# Patient Record
Sex: MALE | Race: BLACK OR AFRICAN AMERICAN | NOT HISPANIC OR LATINO | Employment: OTHER | URBAN - NONMETROPOLITAN AREA
[De-identification: names, ages, dates, MRNs, and addresses within clinical notes are randomized per-mention and may not be internally consistent; named-entity substitution may affect disease eponyms.]

---

## 2017-03-10 ENCOUNTER — OFFICE VISIT (OUTPATIENT)
Dept: UROLOGY | Facility: CLINIC | Age: 67
End: 2017-03-10

## 2017-03-10 VITALS — TEMPERATURE: 97.4 F | HEIGHT: 68 IN | WEIGHT: 146.8 LBS | BODY MASS INDEX: 22.25 KG/M2

## 2017-03-10 DIAGNOSIS — N40.1 BPH (BENIGN PROSTATIC HYPERTROPHY) WITH URINARY OBSTRUCTION: Primary | ICD-10-CM

## 2017-03-10 DIAGNOSIS — N13.8 BPH (BENIGN PROSTATIC HYPERTROPHY) WITH URINARY OBSTRUCTION: Primary | ICD-10-CM

## 2017-03-10 PROCEDURE — 99214 OFFICE O/P EST MOD 30 MIN: CPT | Performed by: UROLOGY

## 2017-03-10 NOTE — PROGRESS NOTES
"Subjective    Mr. Mercado is 66 y.o. male    Chief Complaint:BPH  History of Present Illness     Benign Prostatic Hypertrophy  Patient complains of lower urinary tract symptoms. He reports frequency, incomplete emptying, intermittency, nocturia three times a night, straining, urgency and weak stream. He denies dysuria. Patient states symptoms are of severe severity. Onset of symptoms was several years ago and was gradual in onset. His AUA Symptom Score is, 28/35.He reports a history of recurrent UTI. He denies flank pain, gross hematuria and kidney stones.  Patient has tried Alpha blockers and 5 alpha reductase inhibitors without improvement. Last PSA was 4.6.            The following portions of the patient's history were reviewed and updated as appropriate: allergies, current medications, past family history, past medical history, past social history, past surgical history and problem list.    Review of Systems   Constitutional: Negative for chills and fever.   Gastrointestinal: Negative for abdominal pain, anal bleeding and blood in stool.   Genitourinary: Positive for difficulty urinating, frequency and urgency. Negative for flank pain and hematuria.       No current outpatient prescriptions on file.    History reviewed. No pertinent past medical history.    History reviewed. No pertinent past surgical history.    Social History     Social History   • Marital status: Single     Spouse name: N/A   • Number of children: N/A   • Years of education: N/A     Social History Main Topics   • Smoking status: Current Every Day Smoker     Types: Cigarettes   • Smokeless tobacco: None   • Alcohol use Yes   • Drug use: None   • Sexual activity: Not Asked     Other Topics Concern   • None     Social History Narrative   • None       Family History   Problem Relation Age of Onset   • No Known Problems Father    • No Known Problems Mother        Objective    Visit Vitals   • Temp 97.4 °F (36.3 °C)   • Ht 68\" (172.7 cm)   • Wt " 146 lb 12.8 oz (66.6 kg)   • BMI 22.32 kg/m2       Physical Exam   Constitutional: He is oriented to person, place, and time. He appears well-developed and well-nourished. No distress.   Pulmonary/Chest: Effort normal.   Abdominal: Soft. He exhibits no distension and no mass. There is no tenderness. There is no rebound and no guarding. No hernia.   Neurological: He is alert and oriented to person, place, and time.   Skin: Skin is warm and dry. He is not diaphoretic.   Psychiatric: He has a normal mood and affect.   Vitals reviewed.          Results for orders placed or performed in visit on 11/07/16   PSA, Total & Free   Result Value Ref Range    PSA 4.6 (H) 0.0 - 4.0 ng/mL    PSA, Free 1.16 N/A ng/mL    PSA, Free % 25.2 %     Assessment and Plan    Paul was seen today for benign prostatic hypertrophy.    Diagnoses and all orders for this visit:    BPH (benign prostatic hypertrophy) with urinary obstruction        Patient has had worsening voiding symptoms.  He has had episodes of recurrent epididymitis in the past.  He has a history of negative prostate biopsy due to thousand 13 his last PSA was 4.6 with a percent free of 25%.  We discussed options of think the most reasonable option is to proceed with cystoscopy next week with likely plans for him to undergo a TURP in the near future.  I discussed risks and benefits of TURP and he voiced understanding of these.

## 2017-03-17 ENCOUNTER — PROCEDURE VISIT (OUTPATIENT)
Dept: UROLOGY | Facility: CLINIC | Age: 67
End: 2017-03-17

## 2017-03-17 DIAGNOSIS — N13.8 BPH WITH URINARY OBSTRUCTION: Primary | ICD-10-CM

## 2017-03-17 DIAGNOSIS — N40.1 BPH WITH URINARY OBSTRUCTION: Primary | ICD-10-CM

## 2017-03-17 LAB
BILIRUB BLD-MCNC: NEGATIVE MG/DL
CLARITY, POC: CLEAR
COLOR UR: YELLOW
GLUCOSE UR STRIP-MCNC: NEGATIVE MG/DL
KETONES UR QL: NEGATIVE
LEUKOCYTE EST, POC: ABNORMAL
NITRITE UR-MCNC: NEGATIVE MG/ML
PH UR: 6 [PH] (ref 5–8)
PROT UR STRIP-MCNC: NEGATIVE MG/DL
RBC # UR STRIP: NEGATIVE /UL
SP GR UR: 1.02 (ref 1–1.03)
UROBILINOGEN UR QL: NORMAL

## 2017-03-17 PROCEDURE — 99213 OFFICE O/P EST LOW 20 MIN: CPT | Performed by: UROLOGY

## 2017-03-17 PROCEDURE — 81003 URINALYSIS AUTO W/O SCOPE: CPT | Performed by: UROLOGY

## 2017-03-17 PROCEDURE — 52000 CYSTOURETHROSCOPY: CPT | Performed by: UROLOGY

## 2017-03-17 RX ORDER — SODIUM CHLORIDE 9 MG/ML
100 INJECTION, SOLUTION INTRAVENOUS CONTINUOUS
Status: CANCELLED | OUTPATIENT
Start: 2017-03-17

## 2017-03-17 RX ORDER — SODIUM CHLORIDE 0.9 % (FLUSH) 0.9 %
1-10 SYRINGE (ML) INJECTION AS NEEDED
Status: CANCELLED | OUTPATIENT
Start: 2017-03-17

## 2017-03-17 NOTE — PROGRESS NOTES
Subjective    Mr. Mercado is 66 y.o. male    Chief Complaint: BPH    History of Present Illness     Benign Prostatic Hypertrophy  Patient complains of lower urinary tract symptoms. He reports frequency, incomplete emptying, intermittency, nocturia three times a night, straining, urgency and weak stream. He denies dysuria. Patient states symptoms are of severe severity. Onset of symptoms was several years ago and was gradual in onset. His AUA Symptom Score is, 28/35.He reports a history of recurrent UTI. He denies flank pain, gross hematuria and kidney stones. Patient has tried Alpha blockers and 5 alpha reductase inhibitors without improvement. Last PSA was 4.6.    The following portions of the patient's history were reviewed and updated as appropriate: allergies, current medications, past family history, past medical history, past social history, past surgical history and problem list.    Review of Systems   Constitutional: Negative for chills and fever.   Gastrointestinal: Negative for abdominal pain, anal bleeding and blood in stool.   Genitourinary: Positive for difficulty urinating, frequency and urgency. Negative for flank pain and hematuria.       No current outpatient prescriptions on file.    History reviewed. No pertinent past medical history.    No past surgical history on file.    Social History     Social History   • Marital status: Single     Spouse name: N/A   • Number of children: N/A   • Years of education: N/A     Social History Main Topics   • Smoking status: Current Every Day Smoker     Types: Cigarettes   • Smokeless tobacco: None   • Alcohol use Yes   • Drug use: None   • Sexual activity: Not Asked     Other Topics Concern   • None     Social History Narrative       Family History   Problem Relation Age of Onset   • No Known Problems Father    • No Known Problems Mother        Objective    There were no vitals taken for this visit.    Physical Exam   Constitutional: He is oriented to person,  place, and time. He appears well-developed and well-nourished. No distress.   Pulmonary/Chest: Effort normal.   Abdominal: Soft. He exhibits no distension and no mass. There is no tenderness. There is no rebound and no guarding. No hernia.   Neurological: He is alert and oriented to person, place, and time.   Skin: Skin is warm and dry. He is not diaphoretic.   Psychiatric: He has a normal mood and affect.   Vitals reviewed.      Pre- operative diagnosis:  Benign prostatic hypertrophy    Post operative diagnosis:  Same    Procedure:  The patient was prepped and draped in a normal sterile fashion.  The urethra was anesthetized with 2% lidocaine jelly.  A flexible cystoscope was introduced per urethra.      Urethra:  Normal    Bladder:  heavy trabeculation    Ureteral orifices:  Normal position bilaterally and Clear efflux bilaterally    Prostate:  lateral lobe hypertrophy    Patient tolerated the procedure well    Complications: none    Blood loss: minimal    Follow up:    Schedule for OR  TURP      Results for orders placed or performed in visit on 03/17/17   POC Urinalysis Dipstick, Automated   Result Value Ref Range    Color Yellow Yellow, Straw, Dark Yellow, Stacy    Clarity, UA Clear Clear    Glucose, UA Negative Negative, 1000 mg/dL (3+) mg/dL    Bilirubin Negative Negative    Ketones, UA Negative Negative    Specific Gravity  1.025 1.005 - 1.030    Blood, UA Negative Negative    pH, Urine 6.0 5.0 - 8.0    Protein, POC Negative Negative mg/dL    Urobilinogen, UA Normal Normal    Leukocytes Small (1+) (A) Negative    Nitrite, UA Negative Negative     Assessment and Plan    Diagnoses and all orders for this visit:    BPH with urinary obstruction  -     POC Urinalysis Dipstick, Automated  -     Case Request; Standing  -     sodium chloride 0.9 % flush 1-10 mL; Infuse 1-10 mL into a venous catheter As Needed for Line Care.  -     sodium chloride 0.9 % infusion; Infuse 100 mL/hr into a venous catheter Continuous.  -      ceFAZolin (ANCEF) 2 g in sodium chloride 0.9 % 100 mL IVPB; Infuse 2 g into a venous catheter 1 (One) Time.  -     Case Request    Other orders  -     Provide instructions to patient on NPO status  -     Obtain informed consent  -     Follow Anesthesia Guidelines / Standing Orders; Standing  -     Verify NPO Status; Standing  -     Verify informed consent; Standing  -     CHRIS hose- To be placed on patient in pre-op; Standing  -     SCD (sequential compression device)- to be placed on patient in Pre-op; Standing  -     Insert Peripheral IV; Standing  -     Saline Lock & Maintain IV Access; Standing      Patient with lateral lobe hypertrophy his prostate.  In addition he has had several episodes of recurrent epididymitis.  I have recommended a TURP we will schedule this in the near future.    Discussion of TURP resulted in significant evaluation and addition to the cystoscopy today

## 2017-03-21 ENCOUNTER — APPOINTMENT (OUTPATIENT)
Dept: PREADMISSION TESTING | Facility: HOSPITAL | Age: 67
End: 2017-03-21

## 2017-03-21 VITALS
HEIGHT: 67 IN | WEIGHT: 146 LBS | OXYGEN SATURATION: 99 % | BODY MASS INDEX: 22.91 KG/M2 | SYSTOLIC BLOOD PRESSURE: 151 MMHG | DIASTOLIC BLOOD PRESSURE: 78 MMHG | HEART RATE: 84 BPM

## 2017-03-21 LAB
BACTERIA UR QL AUTO: ABNORMAL /HPF
BILIRUB UR QL STRIP: NEGATIVE
CLARITY UR: CLEAR
COLOR UR: YELLOW
DEPRECATED RDW RBC AUTO: 43.6 FL (ref 40–54)
ERYTHROCYTE [DISTWIDTH] IN BLOOD BY AUTOMATED COUNT: 14.7 % (ref 12–15)
GLUCOSE UR STRIP-MCNC: NEGATIVE MG/DL
HCT VFR BLD AUTO: 38.3 % (ref 40–52)
HGB BLD-MCNC: 12.4 G/DL (ref 14–18)
HGB UR QL STRIP.AUTO: NEGATIVE
HYALINE CASTS UR QL AUTO: ABNORMAL /LPF
KETONES UR QL STRIP: NEGATIVE
LEUKOCYTE ESTERASE UR QL STRIP.AUTO: ABNORMAL
MCH RBC QN AUTO: 26.4 PG (ref 28–32)
MCHC RBC AUTO-ENTMCNC: 32.4 G/DL (ref 33–36)
MCV RBC AUTO: 81.7 FL (ref 82–95)
NITRITE UR QL STRIP: NEGATIVE
PH UR STRIP.AUTO: 5.5 [PH] (ref 5–8)
PLATELET # BLD AUTO: 234 10*3/MM3 (ref 130–400)
PMV BLD AUTO: 11.3 FL (ref 6–12)
PROT UR QL STRIP: NEGATIVE
RBC # BLD AUTO: 4.69 10*6/MM3 (ref 4.8–5.9)
RBC # UR: ABNORMAL /HPF
REF LAB TEST METHOD: ABNORMAL
SP GR UR STRIP: 1.01 (ref 1–1.03)
SQUAMOUS #/AREA URNS HPF: ABNORMAL /HPF
UROBILINOGEN UR QL STRIP: ABNORMAL
WBC NRBC COR # BLD: 6.82 10*3/MM3 (ref 4.8–10.8)
WBC UR QL AUTO: ABNORMAL /HPF

## 2017-03-21 PROCEDURE — 85027 COMPLETE CBC AUTOMATED: CPT | Performed by: UROLOGY

## 2017-03-21 PROCEDURE — 87086 URINE CULTURE/COLONY COUNT: CPT | Performed by: UROLOGY

## 2017-03-21 PROCEDURE — 93005 ELECTROCARDIOGRAM TRACING: CPT

## 2017-03-21 PROCEDURE — 93010 ELECTROCARDIOGRAM REPORT: CPT | Performed by: INTERNAL MEDICINE

## 2017-03-21 PROCEDURE — 81001 URINALYSIS AUTO W/SCOPE: CPT | Performed by: UROLOGY

## 2017-03-21 NOTE — DISCHARGE INSTRUCTIONS
DAY OF SURGERY INSTRUCTIONS        YOUR SURGEON: KARISHMA ROSS    PROCEDURE: CYSTOSCOPY, TRANSURETHRAL RESECTION OF PROSTATE    DATE OF SURGERY: 3/28/17    ARRIVAL TIME: AS DIRECTED BY OFFICE    DAY OF SURGERY TAKE ONLY THESE MEDICATIONS: NONE.            BEFORE YOU COME TO THE HOSPITAL  (Pre-op instructions)  • Do not eat, drink, smoke or chew gum after midnight the night before surgery.  This also includes no mints.  • Morning of surgery take only the medicines you have been instructed with a sip of water unless otherwise instructed  by your physician.  • Do not shave, wear makeup or dark nail polish.  • Remove all jewelry including rings.  • Leave anything you consider valuable at home.  • Leave your suitcase in the car until after your surgery.  • Bring the following with you if applicable:  o Picture ID and insurance, Medicare or Medicaid cards  o Co-pay/deductible required by insurance (cash, check, credit card)  o Medications (no narcotics) in original bottles (not a list) including all over-the-counter medications.  o Copy of advance directive, living will or power-of- documents if not brought to PAT  o CPAP or BIPAP mask and tubing  o Skin prep instruction sheet  o Relaxation aids (MP3 player, book, magazine)  • Confirm your arrival time with you surgeon the day before your surgery (surgery times are subject to change)  • On the day of surgery check in at registration located at the main entrance of the hospital.       Outpatient Surgery Guidelines, Adult  Outpatient procedures are those for which the person having the procedure is allowed to go home the same day as the procedure. Various procedures are done on an outpatient basis. You should follow some general guidelines if you will be having an outpatient procedure.  LET YOUR HEALTH CARE PROVIDER KNOW ABOUT:  · Any allergies you have.  · All medicines you are taking, including vitamins, herbs, eye drops, creams, and over-the-counter  medicines.  · Previous problems you or members of your family have had with the use of anesthetics.  · Any blood disorders you have.  · Previous surgeries you have had.  · Medical conditions you have.  RISKS AND COMPLICATIONS  Your health care provider will discuss possible risks and complications with you before surgery. Common risks and complications include:    · Problems due to the use of anesthetics.  · Blood loss and replacement (does not apply to minor surgical procedures).  · Temporary increase in pain due to surgery.  · Uncorrected pain or problems that the surgery was meant to correct.  · Infection.  · New damage.  BEFORE THE PROCEDURE  · Ask your health care provider about changing or stopping your regular medicines. You may need to stop taking certain medicines in the days or weeks before the procedure.  · Stop smoking at least 2 weeks before surgery. This lowers your risk for complications during and after surgery. Ask your health care provider for help with this if needed.  · Eat your usual meals and a light supper the day before surgery. Continue fluid intake. Do not drink alcohol.  · Do not eat or drink after midnight the night before your surgery.   · Arrange for someone to take you home and to stay with you for 24 hours after the procedure. Medicine given for your procedure may affect your ability to drive or to care for yourself.  · Call your health care provider's office if you develop an illness or problem that may prevent you from safely having your procedure.  AFTER THE PROCEDURE  After surgery, you will be taken to a recovery area, where your progress will be monitored. If there are no complications, you will be allowed to go home when you are awake, stable, and taking fluids well. You may have numbness around the surgical site. Healing will take some time. You will have tenderness at the surgical site and may have some swelling and bruising. You may also have some nausea.  HOME CARE  INSTRUCTIONS  · Do not drive for 24 hours, or as directed by your health care provider. Do not drive while taking prescription pain medicines.  · Do not drink alcohol for 24 hours.  · Do not make important decisions or sign legal documents for 24 hours.  · You may resume a normal diet and activities as directed.  · Do not lift anything heavier than 10 pounds (4.5 kg) or play contact sports until your health care provider says it is okay.  · Change your bandages (dressings) as directed.  · Only take over-the-counter or prescription medicines as directed by your health care provider.  · Follow up with your health care provider as directed.  SEEK MEDICAL CARE IF:  · You have increased bleeding (more than a small spot) from the surgical site.  · You have redness, swelling, or increasing pain in the wound.  · You see pus coming from the wound.  · You have a fever.  · You notice a bad smell coming from the wound or dressing.  · You feel lightheaded or faint.  · You develop a rash.  · You have trouble breathing.  · You develop allergies.  MAKE SURE YOU:  · Understand these instructions.  · Will watch your condition.  · Will get help right away if you are not doing well or get worse.     This information is not intended to replace advice given to you by your health care provider. Make sure you discuss any questions you have with your health care provider.     Document Released: 09/12/2002 Document Revised: 05/03/2016 Document Reviewed: 05/22/2014  [email or web address] Interactive Patient Education ©2016 [email or web address]       Fall Prevention in Hospitals, Adult  As a hospital patient, your condition and the treatments you receive can increase your risk for falls. Some additional risk factors for falls in a hospital include:  · Being in an unfamiliar environment.  · Being on bed rest.  · Your surgery.  · Taking certain medicines.  · Your tubing requirements, such as intravenous (IV) therapy or catheters.  It is important that you learn how  to decrease fall risks while at the hospital. Below are important tips that can help prevent falls.  SAFETY TIPS FOR PREVENTING FALLS  Talk about your risk of falling.  · Ask your health care provider why you are at risk for falling. Is it your medicine, illness, tubing placement, or something else?  · Make a plan with your health care provider to keep you safe from falls.  · Ask your health care provider or pharmacist about side effects of your medicines. Some medicines can make you dizzy or affect your coordination.  Ask for help.  · Ask for help before getting out of bed. You may need to press your call button.  · Ask for assistance in getting safely to the toilet.  · Ask for a walker or cane to be put at your bedside. Ask that most of the side rails on your bed be placed up before your health care provider leaves the room.  · Ask family or friends to sit with you.  · Ask for things that are out of your reach, such as your glasses, hearing aids, telephone, bedside table, or call button.  Follow these tips to avoid falling:  · Stay lying or seated, rather than standing, while waiting for help.  · Wear rubber-soled slippers or shoes whenever you walk in the hospital.  · Avoid quick, sudden movements.  ¨ Change positions slowly.  ¨ Sit on the side of your bed before standing.  ¨ Stand up slowly and wait before you start to walk.  · Let your health care provider know if there is a spill on the floor.  · Pay careful attention to the medical equipment, electrical cords, and tubes around you.  · When you need help, use your call button by your bed or in the bathroom. Wait for one of your health care providers to help you.  · If you feel dizzy or unsure of your footing, return to bed and wait for assistance.  · Avoid being distracted by the TV, telephone, or another person in your room.  · Do not lean or support yourself on rolling objects, such as IV poles or bedside tables.     This information is not intended to  replace advice given to you by your health care provider. Make sure you discuss any questions you have with your health care provider.     Document Released: 12/15/2001 Document Revised: 01/08/2016 Document Reviewed: 08/25/2013  [employer] Interactive Patient Education ©2016 [employer]       Surgical Site Infections FAQs  What is a Surgical Site Infection (SSI)?  A surgical site infection is an infection that occurs after surgery in the part of the body where the surgery took place. Most patients who have surgery do not develop an infection. However, infections develop in about 1 to 3 out of every 100 patients who have surgery.  Some of the common symptoms of a surgical site infection are:  · Redness and pain around the area where you had surgery  · Drainage of cloudy fluid from your surgical wound  · Fever  Can SSIs be treated?  Yes. Most surgical site infections can be treated with antibiotics. The antibiotic given to you depends on the bacteria (germs) causing the infection. Sometimes patients with SSIs also need another surgery to treat the infection.  What are some of the things that hospitals are doing to prevent SSIs?  To prevent SSIs, doctors, nurses, and other healthcare providers:  · Clean their hands and arms up to their elbows with an antiseptic agent just before the surgery.  · Clean their hands with soap and water or an alcohol-based hand rub before and after caring for each patient.  · May remove some of your hair immediately before your surgery using electric clippers if the hair is in the same area where the procedure will occur. They should not shave you with a razor.  · Wear special hair covers, masks, gowns, and gloves during surgery to keep the surgery area clean.  · Give you antibiotics before your surgery starts. In most cases, you should get antibiotics within 60 minutes before the surgery starts and the antibiotics should be stopped within 24 hours after surgery.  · Clean the skin at the  site of your surgery with a special soap that kills germs.  What can I do to help prevent SSIs?  Before your surgery:  · Tell your doctor about other medical problems you may have. Health problems such as allergies, diabetes, and obesity could affect your surgery and your treatment.  · Quit smoking. Patients who smoke get more infections. Talk to your doctor about how you can quit before your surgery.  · Do not shave near where you will have surgery. Shaving with a razor can irritate your skin and make it easier to develop an infection.  At the time of your surgery:  · Speak up if someone tries to shave you with a razor before surgery. Ask why you need to be shaved and talk with your surgeon if you have any concerns.  · Ask if you will get antibiotics before surgery.  After your surgery:  · Make sure that your healthcare providers clean their hands before examining you, either with soap and water or an alcohol-based hand rub.  · If you do not see your providers clean their hands, please ask them to do so.  · Family and friends who visit you should not touch the surgical wound or dressings.  · Family and friends should clean their hands with soap and water or an alcohol-based hand rub before and after visiting you. If you do not see them clean their hands, ask them to clean their hands.  What do I need to do when I go home from the hospital?  · Before you go home, your doctor or nurse should explain everything you need to know about taking care of your wound. Make sure you understand how to care for your wound before you leave the hospital.  · Always clean your hands before and after caring for your wound.  · Before you go home, make sure you know who to contact if you have questions or problems after you get home.  · If you have any symptoms of an infection, such as redness and pain at the surgery site, drainage, or fever, call your doctor immediately.  If you have additional questions, please ask your doctor or  nurse.  Developed and co-sponsored by The Society for Healthcare Epidemiology of [place] (SHEA); Infectious Diseases Society of [place] (IDSA); American Hospital Association; Association for Professionals in Infection Control and Epidemiology (APIC); Centers for Disease Control and Prevention (CDC); and The Joint Commission.     This information is not intended to replace advice given to you by your health care provider. Make sure you discuss any questions you have with your health care provider.     Document Released: 12/23/2014 Document Revised: 01/08/2016 Document Reviewed: 03/02/2016  [employer] Interactive Patient Education ©2016 [employer]

## 2017-03-22 ENCOUNTER — TELEPHONE (OUTPATIENT)
Dept: UROLOGY | Facility: CLINIC | Age: 67
End: 2017-03-22

## 2017-03-23 ENCOUNTER — TELEPHONE (OUTPATIENT)
Dept: UROLOGY | Facility: CLINIC | Age: 67
End: 2017-03-23

## 2017-03-23 LAB — BACTERIA SPEC AEROBE CULT: NORMAL

## 2017-03-23 NOTE — TELEPHONE ENCOUNTER
I have talked to the patient regarding his labs.        ----- Message from Salbador Aguilar MD sent at 3/23/2017  2:42 PM CDT -----  Regarding: FW: Visit Follow-Up Question  Contact: [phone]  He is a little anemic but ok for surgery.  Will need to follow up with his PCP regarding that.  ----- Message -----     From: Alyssa Mukherjee CMA     Sent: 3/23/2017   2:17 PM       To: Salbador Aguilar MD  Subject: FW: Visit Follow-Up Question                     His CBC was abnormal, anything I should tell him specifically?  ----- Message -----     From: Paul Mercado     Sent: 3/22/2017  12:04 PM       To: [facility] Clinical Pool  Subject: Visit Follow-Up Question                         How did my lab work turn out yesterday

## 2017-03-28 ENCOUNTER — HOSPITAL ENCOUNTER (OUTPATIENT)
Facility: HOSPITAL | Age: 67
Setting detail: OBSERVATION
Discharge: HOME | End: 2017-03-29
Attending: UROLOGY | Admitting: UROLOGY

## 2017-03-28 ENCOUNTER — ANESTHESIA (OUTPATIENT)
Dept: PERIOP | Facility: HOSPITAL | Age: 67
End: 2017-03-28

## 2017-03-28 ENCOUNTER — ANESTHESIA EVENT (OUTPATIENT)
Dept: PERIOP | Facility: HOSPITAL | Age: 67
End: 2017-03-28

## 2017-03-28 DIAGNOSIS — N40.1 BPH WITH URINARY OBSTRUCTION: ICD-10-CM

## 2017-03-28 DIAGNOSIS — N13.8 BPH WITH URINARY OBSTRUCTION: ICD-10-CM

## 2017-03-28 PROCEDURE — G0378 HOSPITAL OBSERVATION PER HR: HCPCS

## 2017-03-28 PROCEDURE — 25010000003 CEFAZOLIN PER 500 MG: Performed by: UROLOGY

## 2017-03-28 PROCEDURE — 52601 PROSTATECTOMY (TURP): CPT | Performed by: UROLOGY

## 2017-03-28 PROCEDURE — 25010000002 MIDAZOLAM PER 1 MG: Performed by: ANESTHESIOLOGY

## 2017-03-28 PROCEDURE — 25010000002 DEXAMETHASONE PER 1 MG: Performed by: NURSE ANESTHETIST, CERTIFIED REGISTERED

## 2017-03-28 PROCEDURE — 25810000003 POTASSIUM CHLORIDE PER 2 MEQ: Performed by: UROLOGY

## 2017-03-28 PROCEDURE — 25010000002 FUROSEMIDE PER 20 MG: Performed by: NURSE ANESTHETIST, CERTIFIED REGISTERED

## 2017-03-28 PROCEDURE — 94799 UNLISTED PULMONARY SVC/PX: CPT

## 2017-03-28 PROCEDURE — 88342 IMHCHEM/IMCYTCHM 1ST ANTB: CPT | Performed by: UROLOGY

## 2017-03-28 PROCEDURE — 25010000002 PROPOFOL 10 MG/ML EMULSION: Performed by: NURSE ANESTHETIST, CERTIFIED REGISTERED

## 2017-03-28 PROCEDURE — 25010000002 FENTANYL CITRATE (PF) 250 MCG/5ML SOLUTION: Performed by: NURSE ANESTHETIST, CERTIFIED REGISTERED

## 2017-03-28 PROCEDURE — 25010000002 HYDROMORPHONE PER 4 MG: Performed by: ANESTHESIOLOGY

## 2017-03-28 PROCEDURE — 25010000002 ONDANSETRON PER 1 MG: Performed by: NURSE ANESTHETIST, CERTIFIED REGISTERED

## 2017-03-28 PROCEDURE — 25010000002 NEOSTIGMINE PER 0.5 MG: Performed by: NURSE ANESTHETIST, CERTIFIED REGISTERED

## 2017-03-28 PROCEDURE — 25010000002 MEPERIDINE PER 100 MG: Performed by: ANESTHESIOLOGY

## 2017-03-28 PROCEDURE — 88305 TISSUE EXAM BY PATHOLOGIST: CPT | Performed by: UROLOGY

## 2017-03-28 RX ORDER — ATROPA BELLADONNA AND OPIUM 16.2; 6 MG/1; MG/1
SUPPOSITORY RECTAL
Status: COMPLETED
Start: 2017-03-28 | End: 2017-03-28

## 2017-03-28 RX ORDER — MORPHINE SULFATE 2 MG/ML
2 INJECTION, SOLUTION INTRAMUSCULAR; INTRAVENOUS AS NEEDED
Status: DISCONTINUED | OUTPATIENT
Start: 2017-03-28 | End: 2017-03-28 | Stop reason: HOSPADM

## 2017-03-28 RX ORDER — MEPERIDINE HYDROCHLORIDE 25 MG/ML
12.5 INJECTION INTRAMUSCULAR; INTRAVENOUS; SUBCUTANEOUS
Status: DISCONTINUED | OUTPATIENT
Start: 2017-03-28 | End: 2017-03-28 | Stop reason: HOSPADM

## 2017-03-28 RX ORDER — HYDRALAZINE HYDROCHLORIDE 20 MG/ML
5 INJECTION INTRAMUSCULAR; INTRAVENOUS
Status: DISCONTINUED | OUTPATIENT
Start: 2017-03-28 | End: 2017-03-28 | Stop reason: HOSPADM

## 2017-03-28 RX ORDER — SODIUM CHLORIDE 0.9 % (FLUSH) 0.9 %
1-10 SYRINGE (ML) INJECTION AS NEEDED
Status: DISCONTINUED | OUTPATIENT
Start: 2017-03-28 | End: 2017-03-28 | Stop reason: HOSPADM

## 2017-03-28 RX ORDER — SODIUM CHLORIDE AND POTASSIUM CHLORIDE 150; 450 MG/100ML; MG/100ML
50 INJECTION, SOLUTION INTRAVENOUS CONTINUOUS
Status: DISCONTINUED | OUTPATIENT
Start: 2017-03-28 | End: 2017-03-29 | Stop reason: HOSPADM

## 2017-03-28 RX ORDER — ONDANSETRON 4 MG/1
4 TABLET, ORALLY DISINTEGRATING ORAL EVERY 6 HOURS PRN
Status: DISCONTINUED | OUTPATIENT
Start: 2017-03-28 | End: 2017-03-29 | Stop reason: HOSPADM

## 2017-03-28 RX ORDER — MAGNESIUM HYDROXIDE 1200 MG/15ML
LIQUID ORAL AS NEEDED
Status: DISCONTINUED | OUTPATIENT
Start: 2017-03-28 | End: 2017-03-28 | Stop reason: HOSPADM

## 2017-03-28 RX ORDER — ONDANSETRON 2 MG/ML
4 INJECTION INTRAMUSCULAR; INTRAVENOUS AS NEEDED
Status: DISCONTINUED | OUTPATIENT
Start: 2017-03-28 | End: 2017-03-28 | Stop reason: HOSPADM

## 2017-03-28 RX ORDER — ONDANSETRON 2 MG/ML
INJECTION INTRAMUSCULAR; INTRAVENOUS AS NEEDED
Status: DISCONTINUED | OUTPATIENT
Start: 2017-03-28 | End: 2017-03-28 | Stop reason: SURG

## 2017-03-28 RX ORDER — LABETALOL HYDROCHLORIDE 5 MG/ML
5 INJECTION, SOLUTION INTRAVENOUS
Status: DISCONTINUED | OUTPATIENT
Start: 2017-03-28 | End: 2017-03-28 | Stop reason: HOSPADM

## 2017-03-28 RX ORDER — FUROSEMIDE 10 MG/ML
INJECTION INTRAMUSCULAR; INTRAVENOUS AS NEEDED
Status: DISCONTINUED | OUTPATIENT
Start: 2017-03-28 | End: 2017-03-28 | Stop reason: SURG

## 2017-03-28 RX ORDER — ACETAMINOPHEN 10 MG/ML
1000 INJECTION, SOLUTION INTRAVENOUS ONCE
Status: COMPLETED | OUTPATIENT
Start: 2017-03-28 | End: 2017-03-28

## 2017-03-28 RX ORDER — IPRATROPIUM BROMIDE AND ALBUTEROL SULFATE 2.5; .5 MG/3ML; MG/3ML
3 SOLUTION RESPIRATORY (INHALATION) ONCE
Status: COMPLETED | OUTPATIENT
Start: 2017-03-28 | End: 2017-03-28

## 2017-03-28 RX ORDER — FLUMAZENIL 0.1 MG/ML
0.2 INJECTION INTRAVENOUS AS NEEDED
Status: DISCONTINUED | OUTPATIENT
Start: 2017-03-28 | End: 2017-03-28 | Stop reason: HOSPADM

## 2017-03-28 RX ORDER — METOCLOPRAMIDE HYDROCHLORIDE 5 MG/ML
5 INJECTION INTRAMUSCULAR; INTRAVENOUS
Status: DISCONTINUED | OUTPATIENT
Start: 2017-03-28 | End: 2017-03-28 | Stop reason: HOSPADM

## 2017-03-28 RX ORDER — PHENYLEPHRINE HCL IN 0.9% NACL 0.8MG/10ML
SYRINGE (ML) INTRAVENOUS AS NEEDED
Status: DISCONTINUED | OUTPATIENT
Start: 2017-03-28 | End: 2017-03-28 | Stop reason: SURG

## 2017-03-28 RX ORDER — LIDOCAINE HYDROCHLORIDE 40 MG/ML
SOLUTION TOPICAL AS NEEDED
Status: DISCONTINUED | OUTPATIENT
Start: 2017-03-28 | End: 2017-03-28 | Stop reason: SURG

## 2017-03-28 RX ORDER — MIDAZOLAM HYDROCHLORIDE 1 MG/ML
1 INJECTION INTRAMUSCULAR; INTRAVENOUS
Status: DISCONTINUED | OUTPATIENT
Start: 2017-03-28 | End: 2017-03-28 | Stop reason: HOSPADM

## 2017-03-28 RX ORDER — FENTANYL CITRATE 50 UG/ML
INJECTION, SOLUTION INTRAMUSCULAR; INTRAVENOUS AS NEEDED
Status: DISCONTINUED | OUTPATIENT
Start: 2017-03-28 | End: 2017-03-28 | Stop reason: SURG

## 2017-03-28 RX ORDER — PROPOFOL 10 MG/ML
VIAL (ML) INTRAVENOUS AS NEEDED
Status: DISCONTINUED | OUTPATIENT
Start: 2017-03-28 | End: 2017-03-28 | Stop reason: SURG

## 2017-03-28 RX ORDER — FAMOTIDINE 10 MG/ML
20 INJECTION, SOLUTION INTRAVENOUS
Status: DISCONTINUED | OUTPATIENT
Start: 2017-03-28 | End: 2017-03-28 | Stop reason: HOSPADM

## 2017-03-28 RX ORDER — NICOTINE 21 MG/24HR
1 PATCH, TRANSDERMAL 24 HOURS TRANSDERMAL EVERY 24 HOURS
Status: DISCONTINUED | OUTPATIENT
Start: 2017-03-28 | End: 2017-03-29 | Stop reason: HOSPADM

## 2017-03-28 RX ORDER — DEXAMETHASONE SODIUM PHOSPHATE 4 MG/ML
INJECTION, SOLUTION INTRA-ARTICULAR; INTRALESIONAL; INTRAMUSCULAR; INTRAVENOUS; SOFT TISSUE AS NEEDED
Status: DISCONTINUED | OUTPATIENT
Start: 2017-03-28 | End: 2017-03-28 | Stop reason: SURG

## 2017-03-28 RX ORDER — LIDOCAINE HYDROCHLORIDE 20 MG/ML
INJECTION, SOLUTION INFILTRATION; PERINEURAL AS NEEDED
Status: DISCONTINUED | OUTPATIENT
Start: 2017-03-28 | End: 2017-03-28 | Stop reason: SURG

## 2017-03-28 RX ORDER — NALOXONE HCL 0.4 MG/ML
0.04 VIAL (ML) INJECTION AS NEEDED
Status: DISCONTINUED | OUTPATIENT
Start: 2017-03-28 | End: 2017-03-28 | Stop reason: HOSPADM

## 2017-03-28 RX ORDER — MIDAZOLAM HYDROCHLORIDE 1 MG/ML
2 INJECTION INTRAMUSCULAR; INTRAVENOUS
Status: DISCONTINUED | OUTPATIENT
Start: 2017-03-28 | End: 2017-03-28 | Stop reason: HOSPADM

## 2017-03-28 RX ORDER — IPRATROPIUM BROMIDE AND ALBUTEROL SULFATE 2.5; .5 MG/3ML; MG/3ML
3 SOLUTION RESPIRATORY (INHALATION) ONCE AS NEEDED
Status: DISCONTINUED | OUTPATIENT
Start: 2017-03-28 | End: 2017-03-28 | Stop reason: HOSPADM

## 2017-03-28 RX ORDER — ROCURONIUM BROMIDE 10 MG/ML
INJECTION, SOLUTION INTRAVENOUS AS NEEDED
Status: DISCONTINUED | OUTPATIENT
Start: 2017-03-28 | End: 2017-03-28 | Stop reason: SURG

## 2017-03-28 RX ORDER — ONDANSETRON 2 MG/ML
4 INJECTION INTRAMUSCULAR; INTRAVENOUS EVERY 6 HOURS PRN
Status: DISCONTINUED | OUTPATIENT
Start: 2017-03-28 | End: 2017-03-29 | Stop reason: HOSPADM

## 2017-03-28 RX ORDER — ONDANSETRON 4 MG/1
4 TABLET, FILM COATED ORAL EVERY 6 HOURS PRN
Status: DISCONTINUED | OUTPATIENT
Start: 2017-03-28 | End: 2017-03-29 | Stop reason: HOSPADM

## 2017-03-28 RX ORDER — ACETAMINOPHEN 325 MG/1
650 TABLET ORAL EVERY 4 HOURS PRN
Status: DISCONTINUED | OUTPATIENT
Start: 2017-03-28 | End: 2017-03-29 | Stop reason: HOSPADM

## 2017-03-28 RX ORDER — SODIUM CHLORIDE 9 MG/ML
100 INJECTION, SOLUTION INTRAVENOUS CONTINUOUS
Status: DISCONTINUED | OUTPATIENT
Start: 2017-03-28 | End: 2017-03-28 | Stop reason: HOSPADM

## 2017-03-28 RX ORDER — SODIUM CHLORIDE, SODIUM LACTATE, POTASSIUM CHLORIDE, CALCIUM CHLORIDE 600; 310; 30; 20 MG/100ML; MG/100ML; MG/100ML; MG/100ML
100 INJECTION, SOLUTION INTRAVENOUS CONTINUOUS
Status: DISCONTINUED | OUTPATIENT
Start: 2017-03-28 | End: 2017-03-28 | Stop reason: HOSPADM

## 2017-03-28 RX ORDER — NALOXONE HCL 0.4 MG/ML
0.1 VIAL (ML) INJECTION
Status: DISCONTINUED | OUTPATIENT
Start: 2017-03-28 | End: 2017-03-29 | Stop reason: HOSPADM

## 2017-03-28 RX ORDER — GLYCOPYRROLATE 0.2 MG/ML
INJECTION INTRAMUSCULAR; INTRAVENOUS AS NEEDED
Status: DISCONTINUED | OUTPATIENT
Start: 2017-03-28 | End: 2017-03-28 | Stop reason: SURG

## 2017-03-28 RX ORDER — ATROPA BELLADONNA AND OPIUM 16.2; 6 MG/1; MG/1
30 SUPPOSITORY RECTAL DAILY PRN
Status: DISCONTINUED | OUTPATIENT
Start: 2017-03-28 | End: 2017-03-29 | Stop reason: HOSPADM

## 2017-03-28 RX ORDER — HYDROCODONE BITARTRATE AND ACETAMINOPHEN 7.5; 325 MG/1; MG/1
1 TABLET ORAL EVERY 4 HOURS PRN
Status: DISCONTINUED | OUTPATIENT
Start: 2017-03-28 | End: 2017-03-29 | Stop reason: HOSPADM

## 2017-03-28 RX ADMIN — FUROSEMIDE 20 MG: 10 INJECTION, SOLUTION INTRAMUSCULAR; INTRAVENOUS at 10:33

## 2017-03-28 RX ADMIN — DEXAMETHASONE SODIUM PHOSPHATE 4 MG: 4 INJECTION, SOLUTION INTRAMUSCULAR; INTRAVENOUS at 08:52

## 2017-03-28 RX ADMIN — ONDANSETRON HYDROCHLORIDE 4 MG: 2 SOLUTION INTRAMUSCULAR; INTRAVENOUS at 08:52

## 2017-03-28 RX ADMIN — POTASSIUM CHLORIDE AND SODIUM CHLORIDE 50 ML/HR: 450; 150 INJECTION, SOLUTION INTRAVENOUS at 15:13

## 2017-03-28 RX ADMIN — ROCURONIUM BROMIDE 10 MG: 10 INJECTION INTRAVENOUS at 09:24

## 2017-03-28 RX ADMIN — FENTANYL CITRATE 150 MCG: 50 INJECTION INTRAMUSCULAR; INTRAVENOUS at 08:52

## 2017-03-28 RX ADMIN — ROCURONIUM BROMIDE 5 MG: 10 INJECTION INTRAVENOUS at 10:03

## 2017-03-28 RX ADMIN — PROPOFOL 70 MG: 10 INJECTION, EMULSION INTRAVENOUS at 08:52

## 2017-03-28 RX ADMIN — PROPOFOL 50 MG: 10 INJECTION, EMULSION INTRAVENOUS at 10:03

## 2017-03-28 RX ADMIN — CEFAZOLIN SODIUM 2 G: 2 SOLUTION INTRAVENOUS at 15:13

## 2017-03-28 RX ADMIN — FUROSEMIDE 20 MG: 10 INJECTION, SOLUTION INTRAMUSCULAR; INTRAVENOUS at 11:22

## 2017-03-28 RX ADMIN — HYDROMORPHONE HYDROCHLORIDE 0.5 MG: 1 INJECTION, SOLUTION INTRAMUSCULAR; INTRAVENOUS; SUBCUTANEOUS at 11:47

## 2017-03-28 RX ADMIN — HYDROMORPHONE HYDROCHLORIDE 0.5 MG: 1 INJECTION, SOLUTION INTRAMUSCULAR; INTRAVENOUS; SUBCUTANEOUS at 11:45

## 2017-03-28 RX ADMIN — FENTANYL CITRATE 100 MCG: 50 INJECTION INTRAMUSCULAR; INTRAVENOUS at 09:16

## 2017-03-28 RX ADMIN — LIDOCAINE HYDROCHLORIDE 100 MG: 20 INJECTION, SOLUTION INFILTRATION; PERINEURAL at 08:52

## 2017-03-28 RX ADMIN — MIDAZOLAM HYDROCHLORIDE 2 MG: 1 INJECTION, SOLUTION INTRAMUSCULAR; INTRAVENOUS at 07:39

## 2017-03-28 RX ADMIN — LIDOCAINE HYDROCHLORIDE 0.5 ML: 10 INJECTION, SOLUTION EPIDURAL; INFILTRATION; INTRACAUDAL; PERINEURAL at 07:39

## 2017-03-28 RX ADMIN — CEFAZOLIN SODIUM 2 G: 2 SOLUTION INTRAVENOUS at 08:56

## 2017-03-28 RX ADMIN — NICOTINE 1 PATCH: 14 PATCH, EXTENDED RELEASE TRANSDERMAL at 15:21

## 2017-03-28 RX ADMIN — SODIUM CHLORIDE, POTASSIUM CHLORIDE, SODIUM LACTATE AND CALCIUM CHLORIDE 100 ML/HR: 600; 310; 30; 20 INJECTION, SOLUTION INTRAVENOUS at 07:39

## 2017-03-28 RX ADMIN — ACETAMINOPHEN 1000 MG: 10 INJECTION, SOLUTION INTRAVENOUS at 07:39

## 2017-03-28 RX ADMIN — ROCURONIUM BROMIDE 25 MG: 10 INJECTION INTRAVENOUS at 08:52

## 2017-03-28 RX ADMIN — GLYCOPYRROLATE 0.3 MG: 0.2 INJECTION, SOLUTION INTRAMUSCULAR; INTRAVENOUS at 11:08

## 2017-03-28 RX ADMIN — Medication 3 MG: at 11:08

## 2017-03-28 RX ADMIN — CEFAZOLIN SODIUM 2 G: 2 SOLUTION INTRAVENOUS at 23:00

## 2017-03-28 RX ADMIN — LIDOCAINE HYDROCHLORIDE 1 EACH: 40 SOLUTION TOPICAL at 08:54

## 2017-03-28 RX ADMIN — SODIUM CHLORIDE, POTASSIUM CHLORIDE, SODIUM LACTATE AND CALCIUM CHLORIDE: 600; 310; 30; 20 INJECTION, SOLUTION INTRAVENOUS at 10:39

## 2017-03-28 RX ADMIN — ATROPA BELLADONNA AND OPIUM 60 MG: 16.2; 6 SUPPOSITORY RECTAL at 12:47

## 2017-03-28 RX ADMIN — MEPERIDINE HYDROCHLORIDE 12.5 MG: 25 INJECTION, SOLUTION INTRAMUSCULAR; INTRAVENOUS; SUBCUTANEOUS at 11:40

## 2017-03-28 RX ADMIN — Medication 80 MCG: at 09:09

## 2017-03-28 RX ADMIN — FAMOTIDINE 20 MG: 10 INJECTION, SOLUTION INTRAVENOUS at 07:39

## 2017-03-28 RX ADMIN — IPRATROPIUM BROMIDE AND ALBUTEROL SULFATE 3 ML: .5; 3 SOLUTION RESPIRATORY (INHALATION) at 07:39

## 2017-03-28 RX ADMIN — MEPERIDINE HYDROCHLORIDE 12.5 MG: 25 INJECTION, SOLUTION INTRAMUSCULAR; INTRAVENOUS; SUBCUTANEOUS at 11:45

## 2017-03-28 NOTE — ANESTHESIA PREPROCEDURE EVALUATION
Anesthesia Evaluation     Patient summary reviewed and Nursing notes reviewed   no history of anesthetic complications:  NPO Status: > 8 hours   Airway   Mallampati: I  TM distance: <3 FB  Neck ROM: full  no difficulty expected  Dental - normal exam     Pulmonary - normal exam   (+) a smoker Current,   Cardiovascular - negative cardio ROS and normal exam  Exercise tolerance: good (4-7 METS)    ECG reviewed    (-) hypertension, CAD, dysrhythmias, angina      Neuro/Psych- negative ROS  (-) seizures, TIA, CVA  GI/Hepatic/Renal/Endo - negative ROS   (-) liver disease, renal disease, diabetes    Musculoskeletal (-) negative ROS    Abdominal  - normal exam    Bowel sounds: normal.   Substance History - negative use     OB/GYN negative ob/gyn ROS         Other                                    Anesthesia Plan    ASA 2     general     intravenous induction   Anesthetic plan and risks discussed with patient.

## 2017-03-28 NOTE — ANESTHESIA PROCEDURE NOTES
Airway  Urgency: elective    Airway not difficult    General Information and Staff    Patient location during procedure: OR  CRNA: ARNAV [id number]    Indications and Patient Condition  Indications for airway management: airway protection    Preoxygenated: yes  MILS maintained throughout  Mask difficulty assessment: 1 - vent by mask    Final Airway Details  Final airway type: endotracheal airway      Successful airway: ETT  Cuffed: yes   Successful intubation technique: direct laryngoscopy  Endotracheal tube insertion site: oral  Blade: [place]  Blade size: #2  ETT size: 7.5 mm  Cormack-Lehane Classification: grade I - full view of glottis  Placement verified by: chest auscultation and capnometry   Cuff volume (mL): 8  Measured from: lips  ETT to lips (cm): 23  Number of attempts at approach: 1

## 2017-03-28 NOTE — ANESTHESIA POSTPROCEDURE EVALUATION
Patient: Paul Mercado    Procedure Summary     Date Anesthesia Start Anesthesia Stop Room / Location    03/28/17 0848 1127  [facility] OR 01 / [facility] OR       Procedure Diagnosis Surgeon Provider    CYSTOSCOPY TRANSURETHRAL RESECTION OF PROSTATE (N/A Bladder) BPH with urinary obstruction  (BPH with urinary obstruction [N40.1, N13.8]) MD Kieran Wakefield CRNA          Anesthesia Type: general  Last vitals  /66 (03/28/17 1313)    Temp      Pulse 84 (03/28/17 1313)   Resp 15 (03/28/17 1313)    SpO2 97 % (03/28/17 1313)      Post Anesthesia Care and Evaluation    Patient location during evaluation: PACU  Patient participation: complete - patient participated  Level of consciousness: awake and alert  Pain management: adequate  Airway patency: patent  Anesthetic complications: No anesthetic complications    Cardiovascular status: acceptable and hemodynamically stable  Respiratory status: acceptable  Hydration status: acceptable

## 2017-03-29 VITALS
RESPIRATION RATE: 16 BRPM | WEIGHT: 146 LBS | HEART RATE: 80 BPM | TEMPERATURE: 98.5 F | BODY MASS INDEX: 22.91 KG/M2 | HEIGHT: 67 IN | DIASTOLIC BLOOD PRESSURE: 67 MMHG | OXYGEN SATURATION: 100 % | SYSTOLIC BLOOD PRESSURE: 132 MMHG

## 2017-03-29 LAB
ANION GAP SERPL CALCULATED.3IONS-SCNC: 8 MMOL/L (ref 4–13)
BUN BLD-MCNC: 13 MG/DL (ref 5–21)
BUN/CREAT SERPL: 12.4 (ref 7–25)
CALCIUM SPEC-SCNC: 8.7 MG/DL (ref 8.4–10.4)
CHLORIDE SERPL-SCNC: 107 MMOL/L (ref 98–110)
CO2 SERPL-SCNC: 25 MMOL/L (ref 24–31)
CREAT BLD-MCNC: 1.05 MG/DL (ref 0.5–1.4)
DEPRECATED RDW RBC AUTO: 42 FL (ref 40–54)
ERYTHROCYTE [DISTWIDTH] IN BLOOD BY AUTOMATED COUNT: 14.7 % (ref 12–15)
GFR SERPL CREATININE-BSD FRML MDRD: 71 ML/MIN/1.73
GLUCOSE BLD-MCNC: 93 MG/DL (ref 70–100)
HCT VFR BLD AUTO: 29 % (ref 40–52)
HGB BLD-MCNC: 9.7 G/DL (ref 14–18)
MCH RBC QN AUTO: 27.1 PG (ref 28–32)
MCHC RBC AUTO-ENTMCNC: 33.4 G/DL (ref 33–36)
MCV RBC AUTO: 81 FL (ref 82–95)
PLATELET # BLD AUTO: 204 10*3/MM3 (ref 130–400)
PMV BLD AUTO: 11.7 FL (ref 6–12)
POTASSIUM BLD-SCNC: 3.9 MMOL/L (ref 3.5–5.3)
RBC # BLD AUTO: 3.58 10*6/MM3 (ref 4.8–5.9)
SODIUM BLD-SCNC: 140 MMOL/L (ref 135–145)
WBC NRBC COR # BLD: 10.42 10*3/MM3 (ref 4.8–10.8)

## 2017-03-29 PROCEDURE — 99024 POSTOP FOLLOW-UP VISIT: CPT | Performed by: UROLOGY

## 2017-03-29 PROCEDURE — G0378 HOSPITAL OBSERVATION PER HR: HCPCS

## 2017-03-29 PROCEDURE — 80048 BASIC METABOLIC PNL TOTAL CA: CPT | Performed by: UROLOGY

## 2017-03-29 PROCEDURE — 85027 COMPLETE CBC AUTOMATED: CPT | Performed by: UROLOGY

## 2017-03-29 RX ORDER — HYDROCODONE BITARTRATE AND ACETAMINOPHEN 7.5; 325 MG/1; MG/1
1 TABLET ORAL EVERY 4 HOURS PRN
Qty: 30 TABLET | Refills: 0 | Status: SHIPPED | OUTPATIENT
Start: 2017-03-29 | End: 2017-04-07

## 2017-03-29 RX ORDER — CEPHALEXIN 500 MG/1
500 CAPSULE ORAL 3 TIMES DAILY
Qty: 9 CAPSULE | Refills: 0 | Status: SHIPPED | OUTPATIENT
Start: 2017-03-29 | End: 2017-04-19

## 2017-03-31 ENCOUNTER — PROCEDURE VISIT (OUTPATIENT)
Dept: UROLOGY | Facility: CLINIC | Age: 67
End: 2017-03-31

## 2017-03-31 DIAGNOSIS — N40.1 BPH WITH URINARY OBSTRUCTION: Primary | ICD-10-CM

## 2017-03-31 DIAGNOSIS — N13.8 BPH WITH URINARY OBSTRUCTION: Primary | ICD-10-CM

## 2017-03-31 PROCEDURE — 99024 POSTOP FOLLOW-UP VISIT: CPT | Performed by: UROLOGY

## 2017-03-31 PROCEDURE — 51701 INSERT BLADDER CATHETER: CPT | Performed by: UROLOGY

## 2017-03-31 NOTE — PATIENT INSTRUCTIONS
Patient was advised to drink clear fluids for the next couple hours and urinate. He was advised he may experience some blood in the urine and burning with urination for a couple days. If the patient is unable to urinate or develops fever, chills, N&V or suprapubic pain he will return to call for an appt at clinic or seek medical treatment elsewhere after hours. Patient verbalized understanding and all questions were answered.

## 2017-03-31 NOTE — PROGRESS NOTES
Patient of Dr. Aguilar states he is here today to have his catheter removed. The patient denies any fever, chills or  N&V. Using the catheter in place and sterile water 60cc was installed into the bladder with no complications. Patient was able to urinate 60cc.   Patient advised to schedule his 6 week follow up with Dr. Aguilar and to call the office with any questions or concerns. The patient verbalized understanding. Dr. Aguilar was in the office at the time of procedure.     Patient was advised to drink clear fluids for the next couple hours and urinate. He was advised he may experience some blood in the urine and burning with urination for the next couple days. If the patient is unable to urinate or develops fever, chills, N&V or suprapubic pain he will call to return for an appt at clinic or seek medical treatment at [facility] ER, PCP or Urgent Care after hours. Patient verbalized understanding and all questions were answered.

## 2017-04-04 LAB
CYTO UR: NORMAL
LAB AP CASE REPORT: NORMAL
LAB AP CLINICAL INFORMATION: NORMAL
LAB AP SYNOPTIC CHECKLIST: NORMAL
Lab: NORMAL
PATH REPORT.FINAL DX SPEC: NORMAL
PATH REPORT.GROSS SPEC: NORMAL

## 2017-04-19 ENCOUNTER — PROCEDURE VISIT (OUTPATIENT)
Dept: UROLOGY | Facility: CLINIC | Age: 67
End: 2017-04-19

## 2017-04-19 ENCOUNTER — TELEPHONE (OUTPATIENT)
Dept: UROLOGY | Facility: CLINIC | Age: 67
End: 2017-04-19

## 2017-04-19 VITALS
DIASTOLIC BLOOD PRESSURE: 72 MMHG | WEIGHT: 138 LBS | TEMPERATURE: 98.6 F | SYSTOLIC BLOOD PRESSURE: 136 MMHG | BODY MASS INDEX: 21.66 KG/M2 | HEIGHT: 67 IN

## 2017-04-19 DIAGNOSIS — N39.0 URINARY TRACT INFECTION, SITE UNSPECIFIED: Primary | ICD-10-CM

## 2017-04-19 LAB
BILIRUB BLD-MCNC: NEGATIVE MG/DL
CLARITY, POC: ABNORMAL
COLOR UR: YELLOW
GLUCOSE UR STRIP-MCNC: NEGATIVE MG/DL
KETONES UR QL: NEGATIVE
LEUKOCYTE EST, POC: ABNORMAL
NITRITE UR-MCNC: POSITIVE MG/ML
PH UR: 6 [PH] (ref 5–8)
PROT UR STRIP-MCNC: ABNORMAL MG/DL
RBC # UR STRIP: ABNORMAL /UL
SP GR UR: 1.02 (ref 1–1.03)
UROBILINOGEN UR QL: NORMAL

## 2017-04-19 PROCEDURE — 87086 URINE CULTURE/COLONY COUNT: CPT | Performed by: UROLOGY

## 2017-04-19 PROCEDURE — 51701 INSERT BLADDER CATHETER: CPT | Performed by: UROLOGY

## 2017-04-19 PROCEDURE — 99024 POSTOP FOLLOW-UP VISIT: CPT | Performed by: UROLOGY

## 2017-04-19 NOTE — TELEPHONE ENCOUNTER
----- Message from Bibiana Alex MA sent at 4/19/2017  8:32 AM CDT -----  [phone]    Mr. Mercado called and stated that he is having terrible pain from his waste all the way down to his feet and requested to get in today with Dr. Aguilar    Please Advise

## 2017-04-19 NOTE — TELEPHONE ENCOUNTER
SP TURP 03/28/17   The patient denies any fever, chills, hematuria, N&V or suprapubic abdominal pain. Patient is complaining of burning with urination and pain at night from waist down to feet and in testicle area. Pain level of 6 with Tylenol and it did not work. Please advise. Would you like to see this patient or have him come in for a nursing C&S.

## 2017-04-19 NOTE — PROGRESS NOTES
reviewed    Patient of Dr. Aguilar is here today complaining of burning with urination. The patient denies any fever, chills or N&V. Clean catch urine obtained. UA Auto Dip ran and urine sent for C&S.    Dr. Aguilar was in the office at the time of procedure. Patient was advised that we will call with results. Patient verbalized understanding.

## 2017-04-20 ENCOUNTER — TELEPHONE (OUTPATIENT)
Dept: UROLOGY | Facility: CLINIC | Age: 67
End: 2017-04-20

## 2017-04-20 NOTE — TELEPHONE ENCOUNTER
----- Message from Yancy Rich sent at 4/20/2017 10:02 AM CDT -----  Contact: NISREEN ZAMORANO - cell # [phone] (home # not working)  Patient called, is still having burning and pain issue same as seen for yesterday.

## 2017-04-21 LAB
BACTERIA SPEC AEROBE CULT: ABNORMAL
BACTERIA SPEC AEROBE CULT: ABNORMAL

## 2017-04-21 NOTE — TELEPHONE ENCOUNTER
----- Message from Aura Thomson sent at 4/21/2017 12:34 PM CDT -----  Contact: [phone]  Patient called and wanted results of his test.    Thanks

## 2017-04-21 NOTE — TELEPHONE ENCOUNTER
Patient had a negative culture. Patient is now complaining of swelling in his testicles. He would like to schedule an appt with Dr. Aguilar. He was transferred to schedule appt.     Patient had a negative culture. Patient was advised and verbalized understanding. He stated that all questions were answered. Patient was advised to call our office if hehad any further questions or concerns.

## 2017-05-05 ENCOUNTER — OFFICE VISIT (OUTPATIENT)
Dept: UROLOGY | Facility: CLINIC | Age: 67
End: 2017-05-05

## 2017-05-05 VITALS
BODY MASS INDEX: 20.86 KG/M2 | HEIGHT: 69 IN | SYSTOLIC BLOOD PRESSURE: 136 MMHG | WEIGHT: 140.8 LBS | TEMPERATURE: 97.3 F | DIASTOLIC BLOOD PRESSURE: 74 MMHG

## 2017-05-05 DIAGNOSIS — N40.1 BPH (BENIGN PROSTATIC HYPERTROPHY) WITH URINARY OBSTRUCTION: Primary | ICD-10-CM

## 2017-05-05 DIAGNOSIS — N13.8 BPH (BENIGN PROSTATIC HYPERTROPHY) WITH URINARY OBSTRUCTION: Primary | ICD-10-CM

## 2017-05-05 DIAGNOSIS — C61 CANCER OF PROSTATE (HCC): ICD-10-CM

## 2017-05-05 LAB
BILIRUB BLD-MCNC: NEGATIVE MG/DL
CLARITY, POC: CLEAR
COLOR UR: YELLOW
GLUCOSE UR STRIP-MCNC: NEGATIVE MG/DL
KETONES UR QL: NEGATIVE
LEUKOCYTE EST, POC: ABNORMAL
NITRITE UR-MCNC: NEGATIVE MG/ML
PH UR: 6 [PH] (ref 5–8)
PROT UR STRIP-MCNC: NEGATIVE MG/DL
RBC # UR STRIP: ABNORMAL /UL
SP GR UR: 1.01 (ref 1–1.03)
UROBILINOGEN UR QL: NORMAL

## 2017-05-05 PROCEDURE — 51798 US URINE CAPACITY MEASURE: CPT | Performed by: UROLOGY

## 2017-05-05 PROCEDURE — 81003 URINALYSIS AUTO W/O SCOPE: CPT | Performed by: UROLOGY

## 2017-05-05 PROCEDURE — 99024 POSTOP FOLLOW-UP VISIT: CPT | Performed by: UROLOGY

## 2017-10-10 ENCOUNTER — HOSPITAL ENCOUNTER (EMERGENCY)
Dept: HOSPITAL 58 - ED | Age: 67
Discharge: HOME | End: 2017-10-10

## 2017-10-10 VITALS — DIASTOLIC BLOOD PRESSURE: 82 MMHG | SYSTOLIC BLOOD PRESSURE: 174 MMHG | TEMPERATURE: 98.8 F

## 2017-10-10 VITALS — BODY MASS INDEX: 21.5 KG/M2

## 2017-10-10 DIAGNOSIS — M54.9: Primary | ICD-10-CM

## 2017-10-10 DIAGNOSIS — F17.210: ICD-10-CM

## 2017-10-10 LAB
ALBUMIN SERPL-MCNC: 3.9 G/DL (ref 3.4–5)
ALBUMIN/GLOB SERPL: 0.98 {RATIO}
ALP SERPL-CCNC: 89 U/L (ref 56–119)
ALT SERPL-CCNC: 12 U/L (ref 12–78)
ANION GAP SERPL CALC-SCNC: 12.9 MMOL/L
AST SERPL-CCNC: 23 U/L (ref 15–37)
BASOPHILS # BLD AUTO: 0 K/UL (ref 0–0.2)
BASOPHILS NFR BLD AUTO: 0.3 % (ref 0–3)
BILIRUB SERPL-MCNC: 1 MG/DL (ref 0–1.2)
BUN SERPL-MCNC: 14 MG/DL (ref 7–18)
BUN/CREAT SERPL: 11.76
CALCIUM SERPL-MCNC: 10.1 MG/DL (ref 8.2–10.2)
CHLORIDE SERPL-SCNC: 109 MMOL/L (ref 98–107)
CO2 BLD-SCNC: 22 MMOL/L (ref 23–31)
CREAT SERPL-MCNC: 1.19 MG/DL (ref 0.6–1.1)
EOSINOPHIL # BLD AUTO: 0.1 K/UL (ref 0–0.7)
EOSINOPHIL NFR BLD AUTO: 1.8 % (ref 0–7)
GFR SERPLBLD BASED ON 1.73 SQ M-ARVRAT: 75 ML/MIN
GLOBULIN SER CALC-MCNC: 4 G/L
GLUCOSE SERPL-MCNC: 108 MG/DL (ref 82–115)
HCT VFR BLD AUTO: 37.9 % (ref 42–52)
HGB BLD-MCNC: 12.5 G/DL (ref 14–18)
IMM GRANULOCYTES NFR BLD AUTO: 0.2 % (ref 0–5)
LYMPHOCYTES # SPEC AUTO: 3.4 K/UL (ref 0.6–3.4)
LYMPHOCYTES NFR BLD AUTO: 55.1 % (ref 10–50)
MCH RBC QN: 26.9 PG (ref 27–31)
MCHC RBC AUTO-ENTMCNC: 33 G/DL (ref 31.8–35.4)
MCV RBC: 81.5 FL (ref 80–94)
MONOCYTES # BLD AUTO: 0.4 K/UL (ref 0.4–2)
MONOCYTES NFR BLD AUTO: 6.7 % (ref 0–10)
NEUTROPHILS # BLD AUTO: 2.2 K/UL (ref 2–6.9)
NEUTROPHILS NFR BLD AUTO: 35.9 %
PLATELET # BLD AUTO: 233 10^3/UL (ref 140–440)
POTASSIUM SERPL-SCNC: 3.9 MMOL/L (ref 3.5–5.1)
PROT SERPL-MCNC: 7.9 G/DL (ref 5.8–8.1)
RBC # BLD AUTO: 4.65 10^6/UL (ref 4.7–6.1)
SODIUM SERPL-SCNC: 140 MMOL/L (ref 136–145)
WBC # BLD AUTO: 6.24 K/UL (ref 4.2–10.2)

## 2017-10-10 PROCEDURE — 80053 COMPREHEN METABOLIC PANEL: CPT

## 2017-10-10 PROCEDURE — 85025 COMPLETE CBC W/AUTO DIFF WBC: CPT

## 2017-10-10 PROCEDURE — 99283 EMERGENCY DEPT VISIT LOW MDM: CPT

## 2017-10-10 PROCEDURE — 36415 COLL VENOUS BLD VENIPUNCTURE: CPT

## 2017-10-10 PROCEDURE — 96372 THER/PROPH/DIAG INJ SC/IM: CPT

## 2017-10-10 RX ADMIN — ONDANSETRON STA MG: 2 INJECTION INTRAMUSCULAR; INTRAVENOUS at 09:26

## 2017-10-10 RX ADMIN — MEPERIDINE HYDROCHLORIDE STA MG: 100 INJECTION, SOLUTION INTRAMUSCULAR; INTRAVENOUS; SUBCUTANEOUS at 09:30

## 2017-10-10 NOTE — CT
EXAM:  CT lumbar spine without contrast. 

  

HISTORY:  Back pain. 

  

COMPARISON:  Radiograph 02/03/2010.  Abdominal CT 04/08/2012. 

  

TECHNIQUE:  Multiple axial images of the lumbar spine were obtained without intravenous contrast.  Im
ages were reformatted in the sagittal and coronal planes. 

  

FINDINGS:  Curvature and alignment are normal.  Vertebral body heights are maintained.  There is mild
 loss of disc height at L2-3.  No fracture or subluxation identified.  No osteolytic or osteoblastic 
lesion identified..  Paravertebral soft tissues are without acute abnormality. 

  

T12-L1:  No neural compromise. 

  

L1-2:  No neural compromise. 

  

L2-3:  Disc osteophyte formation and facet arthropathy with flattening of the ventral thecal sac. 

  

L3-4:  Disc osteophyte formation, facet arthropathy and thickening of the ligamentum flavum with mild
 central canal stenosis and bilateral neural foraminal narrowing. 

  

L4-5:  Broad-based disc bulge, facet arthropathy and thickening of ligamentum flavum without signific
ant neural compromise. 

  

L5-S1:  Disc osteophyte formation and facet arthropathy with minimal right and moderate left neural f
oraminal narrowing. 

  

------------------------ 

IMPRESSION: 

1. No acute abnormality of the lumbar spine. 

2.  Mild degenerative changes as described.

## 2017-10-10 NOTE — ED.PDOC
General


ED Provider: 


Dr. CAM RAMOS





Chief Complaint: Back Pain


Stated Complaint: back pain


Time Seen by Physician: 08:00 (no trauma history of prostate disease)


Mode of Arrival: Walk-In


Information Source: Patient


Exam Limitations: No limitations


Nursing and Triage Documentation Reviewed and Agree: Yes





Musculoskeletal Complaint Exam





- Back Pain Complaint/Exam


Mechanism of Injury: Reports: No known trauma (lifted a heavy object)


Onset/Duration: 1 week


Symptoms Are: Still present


Timing: Constant


Episodes Lasting: Days


Initial Severity: Moderate


Current Severity: Moderate


Location: Reports: Discrete


Character: Reports: Aching


Aggravating: Reports: Movements, Lifting, Bending, Walking


Alleviating: Reports: Rest, Position


Associated Signs and Symptoms: Denies: Swelling, Redness, Bruising, Fever, 

Weakness, Numbness, Tingling, Abdominal pain, Flank pain, Bladder incontinence, 

Bowel incontinence, Weight loss, Pain with weight bearing


Related History: Reports: Similar episode


TAD Risk Factors: Reports: None


AAA Risk Factors: Reports: None





Review of Systems





- Review Of Systems


Constitutional: Reports: No symptoms


Eyes: Reports: No symptoms


Ears, Nose, Mouth, Throat: Reports: No symptoms


Respiratory: Reports: No symptoms


Cardiac: Reports: No symptoms


GI: Reports: No symptoms


: Reports: No symptoms


Musculoskeletal: Reports: Back pain


Skin: Reports: No symptoms


Neurological: Reports: No symptoms


Endocrine: Reports: No symptoms


Hematologic/Lymphatic: Reports: No symptoms


All Other Systems: Reviewed and Negative





Past Medical History





- Past Medical History


Previously Healthy: Yes


Endocrine: Reports: None


Cardiovascular: Reports: None


Respiratory: Reports: None


Hematological: Reports: None


Gastrointestinal: Reports: None


Genitourinary: Reports: Other (prostate problems)


Neuro/Psych: Reports: None


Musculoskeletal: Reports: None


Cancer: Reports: None





- Surgical History


General Surgical History: Reports: None





- Family History


Family History: Reports: None





- Social History


Smoking Status: Current every day smoker


Hx Substance Use: No


Alcohol Screening: Occasionally





- Immunizations


Tetanus Shot up to Date: No





Physical Exam





- Physical Exam


Appearance: Well-appearing, No pain distress, Well-nourished


Eyes: LAWSON, EOMI, Conjunctiva clear


ENT: Ears normal, Nose normal, Oropharynx normal


Respiratory: Airway patent, Breath sounds clear, Breath sounds equal, 

Respirations nonlabored


Cardiovascular: RRR, Pulses normal, No rub, No murmur


GI/: Soft, Nontender, No masses, Bowel sounds normal, No Organomegaly


Musculoskeletal: Normal strength, ROM intact, No edema, No calf tenderness


Skin: Warm, Dry, Normal color


Neurological: Sensation intact, Motor intact, Reflexes intact, Cranial nerves 

intact, Alert, Oriented


Psychiatric: Affect appropriate, Mood appropriate





Interpretation





- Radiology Interpretation


Radiology Interpretation By: Radiologist


Radiology Results: No acute changes


Exam Interpreted: CT Scan





Re-Evaluation





- Re-Evaluation


Time of Re-Evaluation: 09:11


Status: Improved


Vital Signs Stable: Yes


Pain Level: 4


Appearance: NAD


Lungs: Clear


Skin: Warm and Dry


Neuro: Alert and Oriented X3


CV: RRR





Critical Care Note





- Critical Care Note


Total Time (mins): 0





Course





- Course


Hematology/Chemistry: 


 10/10/17 08:25





 10/10/17 08:25


Orders, Labs, Meds: 


Lab Review











  10/10/17 10/10/17





  08:25 08:25


 


WBC  6.24 


 


RBC  4.65 L 


 


Hgb  12.5 L 


 


Hct  37.9 L 


 


MCV  81.5 


 


MCH  26.9 L 


 


MCHC  33.0 


 


RDW Coeff of Vikki  14.9 H 


 


Plt Count  233 


 


Immature Gran % (Auto)  0.2 


 


Neut % (Auto)  35.9 


 


Lymph % (Auto)  55.1 H 


 


Mono % (Auto)  6.7 


 


Eos % (Auto)  1.8 


 


Baso % (Auto)  0.3 


 


Immature Gran # (Auto)  0.0 


 


Neut #  2.2 


 


Lymph #  3.4 


 


Mono #  0.4 


 


Eos #  0.1 


 


Baso #  0.0 


 


Sodium   140


 


Potassium   3.9


 


Chloride   109 H


 


Carbon Dioxide   22 L


 


Anion Gap   12.9


 


BUN   14


 


Creatinine   1.19 H


 


Estimated GFR (MDRD)   75.00


 


BUN/Creatinine Ratio   11.76


 


Glucose   108


 


Calcium   10.1


 


Total Bilirubin   1.00


 


AST   23


 


ALT   12


 


Alkaline Phosphatase   89


 


Total Protein   7.9


 


Albumin   3.9


 


Globulin   4.0


 


Albumin/Globulin Ratio   0.98








Orders











 Category Date Time Status


 


 CBC W/ AUTO DIFF Stat LAB  10/10/17 08:25 Completed


 


 COMPREHENSIVE METABOLIC PANEL Stat LAB  10/10/17 08:25 Completed


 


 PSA [PROSTATE SPECIFIC ANTIGEN SCRN] Stat LAB  10/10/17 08:25 Received


 


 Meperidine HCl/Pf [Demerol 100 mg/ml Syringe] MEDS  10/10/17 09:07 Discontinued





 25 mg IM ONCE STA   


 


 Ondansetron HCl/Pf [Zofran 4 mg/2 ml] MEDS  10/10/17 09:07 Discontinued





 4 mg IM ONCE STA   


 


 CT LUMBAR SPINE W/O CONTRAST Stat RADS  10/10/17 08:18 Completed








Medications














Discontinued Medications














Generic Name Dose Route Start Last Admin





  Trade Name Melanie  PRN Reason Stop Dose Admin


 


Meperidine HCl  25 mg  10/10/17 09:07  





  Demerol 100 Mg/Ml Syringe  IM  10/10/17 09:08  





  ONCE STA   


 


Ondansetron HCl  4 mg  10/10/17 09:07  





  Zofran 4 Mg/2 Ml  IM  10/10/17 09:08  





  ONCE STA   











Vital Signs: 


 











  Temp Pulse Resp BP Pulse Ox


 


 10/10/17 07:57  98.8 F  71  20  174/82 H  96














Departure





- Departure


Time of Disposition: 10:00


Disposition: HOME SELF-CARE


Discharge Problem: 


 Backache





Instructions:  Low Back Strain (ED), Acute Low Back Pain (ED), Lower Back 

Exercises (ED)


Condition: Good


Pt referred to PMD for follow-up: Yes


Additional Instructions: 


Please call your Family Physician as soon as possible to schedule a follow-up 

appointment. the back pain is best evaluated with DEDICATED MRI OF THE BACK   .





MRI APPRECIATES LB AS WELL AS SOFT TISSUE  PROBLEM  LIKE DISC DISEASE    

WHICH IS NOT NOTED ON REGUALR PLAIN X RAY FILMS. SO PLEASE DO FOLLOW UP WITH 

YOUR MD  AS SOON AS POSSIBLE, PLEASE OBTAIN YOUR P.S.A REPORT BEFORE YOU GO TO 

SEE YOUR UROLOGIST NEXT WEEK.


Prescriptions: 


Hydrocodone/Acetaminophen [Norco  Tablet] 1 each PO Q8HR #14 tablet


Allergies/Adverse Reactions: 


Allergies





No Known Allergies Allergy (Unverified 10/03/13 09:49)


 








Home Medications: 


Ambulatory Orders





Hydrocodone/Acetaminophen [Norco  Tablet] 1 each PO Q8HR #14 tablet 10/10/

17 








Disposition Discussed With: Patient, Family

## 2017-10-18 DIAGNOSIS — C61 CANCER OF PROSTATE (HCC): ICD-10-CM

## 2017-10-19 LAB — PSA SERPL-MCNC: 1.2 NG/ML (ref 0–4)

## 2017-10-20 ENCOUNTER — HOSPITAL ENCOUNTER (OUTPATIENT)
Dept: HOSPITAL 58 - CAR | Age: 67
Discharge: HOME | End: 2017-10-20
Attending: INTERNAL MEDICINE

## 2017-10-20 VITALS — BODY MASS INDEX: 21.5 KG/M2

## 2017-10-20 DIAGNOSIS — R00.2: Primary | ICD-10-CM

## 2017-10-20 PROCEDURE — 93010 ELECTROCARDIOGRAM REPORT: CPT

## 2017-10-20 PROCEDURE — 93005 ELECTROCARDIOGRAM TRACING: CPT

## 2017-10-25 ENCOUNTER — OFFICE VISIT (OUTPATIENT)
Dept: UROLOGY | Facility: CLINIC | Age: 67
End: 2017-10-25

## 2017-10-25 VITALS
SYSTOLIC BLOOD PRESSURE: 136 MMHG | HEIGHT: 69 IN | WEIGHT: 146.4 LBS | DIASTOLIC BLOOD PRESSURE: 68 MMHG | TEMPERATURE: 98.1 F | BODY MASS INDEX: 21.68 KG/M2

## 2017-10-25 DIAGNOSIS — N40.1 BPH WITH URINARY OBSTRUCTION: ICD-10-CM

## 2017-10-25 DIAGNOSIS — C61 CANCER OF PROSTATE (HCC): Primary | ICD-10-CM

## 2017-10-25 DIAGNOSIS — N13.8 BPH WITH URINARY OBSTRUCTION: ICD-10-CM

## 2017-10-25 LAB
BILIRUB BLD-MCNC: NEGATIVE MG/DL
CLARITY, POC: CLEAR
COLOR UR: YELLOW
GLUCOSE UR STRIP-MCNC: NEGATIVE MG/DL
KETONES UR QL: NEGATIVE
LEUKOCYTE EST, POC: NEGATIVE
NITRITE UR-MCNC: NEGATIVE MG/ML
PH UR: 5.5 [PH] (ref 5–8)
PROT UR STRIP-MCNC: NEGATIVE MG/DL
RBC # UR STRIP: NEGATIVE /UL
SP GR UR: 1.01 (ref 1–1.03)
UROBILINOGEN UR QL: NORMAL

## 2017-10-25 PROCEDURE — 99214 OFFICE O/P EST MOD 30 MIN: CPT | Performed by: UROLOGY

## 2017-10-25 NOTE — PROGRESS NOTES
Subjective    Mr. Mercado is 66 y.o. male    Chief Complaint: Prostate Cancer    History of Present Illness     Prostate Cancer  Pt. presents with history of prostate cancer diagnosed in March 2017. Current disease state islow risk disease Pt. underwent active surveillance  Without having undergone initial surveillance biopsy  Path showing T1c 7 adenocarcinoma of prostate with N/A surgical margins.  Patient has not  received  XRT completed in March 2017 .He is on nothing for management of prostate cancer.    Pt.  having 0 ppd incontinence.  Associated voiding symptoms include No evidence of voiding symptoms .    There has been no bone pain, weight loss, abdominal pain, back pain, or gross hematuria.   Most recent PSA 1.2 (previously 4.6).    The following portions of the patient's history were reviewed and updated as appropriate: allergies, current medications, past family history, past medical history, past social history, past surgical history and problem list.    Review of Systems   Constitutional: Negative for chills and fever.   Gastrointestinal: Negative for abdominal pain and blood in stool.   Genitourinary: Negative for flank pain and hematuria.       No current outpatient prescriptions on file.    Past Medical History:   Diagnosis Date   • Dysuria    • Enlarged prostate    • Urgency of urination        Past Surgical History:   Procedure Laterality Date   • CYSTOSCOPY     • CYSTOSCOPY TRANSURETHRAL RESECTION OF PROSTATE N/A 3/28/2017    Procedure: CYSTOSCOPY TRANSURETHRAL RESECTION OF PROSTATE;  Surgeon: Salbador Aguilar MD;  Location: [facility] OR;  Service:    • HEMORRHOIDECTOMY      AS TEENAGER       Social History     Social History   • Marital status: Single     Spouse name: N/A   • Number of children: N/A   • Years of education: N/A     Social History Main Topics   • Smoking status: Current Every Day Smoker     Types: Cigarettes   • Smokeless tobacco: None   • Alcohol use 1.2 oz/week     2 Cans of  "beer per week      Comment: daily   • Drug use: No   • Sexual activity: Defer     Other Topics Concern   • None     Social History Narrative       Family History   Problem Relation Age of Onset   • No Known Problems Father    • No Known Problems Mother        Objective    /68  Temp 98.1 °F (36.7 °C)  Ht 69\" (175.3 cm)  Wt 146 lb 6.4 oz (66.4 kg)  BMI 21.62 kg/m2    Physical Exam   Constitutional: He is oriented to person, place, and time. He appears well-developed and well-nourished.   Pulmonary/Chest: Effort normal.   Abdominal: Soft. He exhibits no distension and no mass. There is no tenderness. There is no rebound and no guarding. No hernia.   Genitourinary: Penis normal. Rectal exam shows no mass, no tenderness and anal tone normal. Enlarged: for the age of the patient. Right testis shows no mass, no swelling and no tenderness. Left testis shows no mass, no swelling and no tenderness. No hypospadias. No discharge found.   Genitourinary Comments:  The urethral meatus normal in position without evidence of stricture. Epididymis without mass or tenderness. Vas Deferens is palpably normal.Anus and perineum without mass or tenderness. The prostate is approximately 25 ml. It is Symmetric, with a Soft consistency. There are no nodules present.  and There is  at the no nodule of the gland. . The seminal vesicles are Not palpable due to the size of the prostate.     Neurological: He is alert and oriented to person, place, and time.   Vitals reviewed.          Results for orders placed or performed in visit on 10/25/17   POC Urinalysis Dipstick, Automated   Result Value Ref Range    Color Yellow Yellow, Straw, Dark Yellow, Stacy    Clarity, UA Clear Clear    Glucose, UA Negative Negative, 1000 mg/dL (3+) mg/dL    Bilirubin Negative Negative    Ketones, UA Negative Negative    Specific Gravity  1.010 1.005 - 1.030    Blood, UA Negative Negative    pH, Urine 5.5 5.0 - 8.0    Protein, POC Negative Negative mg/dL    " Urobilinogen, UA Normal Normal    Leukocytes Negative Negative    Nitrite, UA Negative Negative     Assessment and Plan    Diagnoses and all orders for this visit:    Cancer of prostate  -     POC Urinalysis Dipstick, Automated    BPH with urinary obstruction            Patient with decreased PSA of 1.2.  No abnormalities on rectal exam.  Plan for continued surveillance.  We will probably proceed with a biopsy after next visit.

## 2017-12-04 PROCEDURE — 99283 EMERGENCY DEPT VISIT LOW MDM: CPT

## 2017-12-05 ENCOUNTER — APPOINTMENT (OUTPATIENT)
Dept: ULTRASOUND IMAGING | Facility: HOSPITAL | Age: 67
End: 2017-12-05

## 2017-12-05 ENCOUNTER — HOSPITAL ENCOUNTER (EMERGENCY)
Facility: HOSPITAL | Age: 67
Discharge: HOME | End: 2017-12-05
Attending: EMERGENCY MEDICINE | Admitting: EMERGENCY MEDICINE

## 2017-12-05 VITALS
TEMPERATURE: 98.6 F | HEIGHT: 69 IN | BODY MASS INDEX: 21.62 KG/M2 | HEART RATE: 86 BPM | WEIGHT: 146 LBS | DIASTOLIC BLOOD PRESSURE: 64 MMHG | RESPIRATION RATE: 15 BRPM | SYSTOLIC BLOOD PRESSURE: 129 MMHG | OXYGEN SATURATION: 98 %

## 2017-12-05 DIAGNOSIS — N45.1 EPIDIDYMITIS, RIGHT: Primary | ICD-10-CM

## 2017-12-05 LAB
ANION GAP SERPL CALCULATED.3IONS-SCNC: 10 MMOL/L (ref 4–13)
BACTERIA UR QL AUTO: ABNORMAL /HPF
BASOPHILS # BLD AUTO: 0.02 10*3/MM3 (ref 0–0.2)
BASOPHILS NFR BLD AUTO: 0.1 % (ref 0–2)
BILIRUB UR QL STRIP: NEGATIVE
BUN BLD-MCNC: 13 MG/DL (ref 5–21)
BUN/CREAT SERPL: 11.6 (ref 7–25)
CALCIUM SPEC-SCNC: 9.8 MG/DL (ref 8.4–10.4)
CHLORIDE SERPL-SCNC: 106 MMOL/L (ref 98–110)
CLARITY UR: ABNORMAL
CO2 SERPL-SCNC: 25 MMOL/L (ref 24–31)
COLOR UR: YELLOW
CREAT BLD-MCNC: 1.12 MG/DL (ref 0.5–1.4)
DEPRECATED RDW RBC AUTO: 40.7 FL (ref 40–54)
EOSINOPHIL # BLD AUTO: 0.01 10*3/MM3 (ref 0–0.7)
EOSINOPHIL NFR BLD AUTO: 0.1 % (ref 0–4)
ERYTHROCYTE [DISTWIDTH] IN BLOOD BY AUTOMATED COUNT: 13.8 % (ref 12–15)
GFR SERPL CREATININE-BSD FRML MDRD: 66 ML/MIN/1.73
GLUCOSE BLD-MCNC: 111 MG/DL (ref 70–100)
GLUCOSE UR STRIP-MCNC: ABNORMAL MG/DL
HCT VFR BLD AUTO: 36.6 % (ref 40–52)
HGB BLD-MCNC: 11.9 G/DL (ref 14–18)
HGB UR QL STRIP.AUTO: ABNORMAL
HOLD SPECIMEN: NORMAL
HOLD SPECIMEN: NORMAL
HYALINE CASTS UR QL AUTO: ABNORMAL /LPF
IMM GRANULOCYTES # BLD: 0.19 10*3/MM3 (ref 0–0.03)
IMM GRANULOCYTES NFR BLD: 1.1 % (ref 0–5)
KETONES UR QL STRIP: ABNORMAL
LEUKOCYTE ESTERASE UR QL STRIP.AUTO: ABNORMAL
LYMPHOCYTES # BLD AUTO: 1.42 10*3/MM3 (ref 0.72–4.86)
LYMPHOCYTES NFR BLD AUTO: 8 % (ref 15–45)
MCH RBC QN AUTO: 26.4 PG (ref 28–32)
MCHC RBC AUTO-ENTMCNC: 32.5 G/DL (ref 33–36)
MCV RBC AUTO: 81.3 FL (ref 82–95)
MONOCYTES # BLD AUTO: 1.01 10*3/MM3 (ref 0.19–1.3)
MONOCYTES NFR BLD AUTO: 5.7 % (ref 4–12)
NEUTROPHILS # BLD AUTO: 15.02 10*3/MM3 (ref 1.87–8.4)
NEUTROPHILS NFR BLD AUTO: 85 % (ref 39–78)
NITRITE UR QL STRIP: POSITIVE
NRBC BLD MANUAL-RTO: 0 /100 WBC (ref 0–0)
PH UR STRIP.AUTO: <=5 [PH] (ref 5–8)
PLATELET # BLD AUTO: 290 10*3/MM3 (ref 130–400)
PMV BLD AUTO: 10.6 FL (ref 6–12)
POTASSIUM BLD-SCNC: 3.9 MMOL/L (ref 3.5–5.3)
PROT UR QL STRIP: ABNORMAL
RBC # BLD AUTO: 4.5 10*6/MM3 (ref 4.8–5.9)
RBC # UR: ABNORMAL /HPF
REF LAB TEST METHOD: ABNORMAL
SODIUM BLD-SCNC: 141 MMOL/L (ref 135–145)
SP GR UR STRIP: 1.03 (ref 1–1.03)
SQUAMOUS #/AREA URNS HPF: ABNORMAL /HPF
UROBILINOGEN UR QL STRIP: ABNORMAL
WBC NRBC COR # BLD: 17.67 10*3/MM3 (ref 4.8–10.8)
WBC UR QL AUTO: ABNORMAL /HPF
WHOLE BLOOD HOLD SPECIMEN: NORMAL
WHOLE BLOOD HOLD SPECIMEN: NORMAL

## 2017-12-05 PROCEDURE — 25010000002 MORPHINE SULFATE (PF) 2 MG/ML SOLUTION: Performed by: EMERGENCY MEDICINE

## 2017-12-05 PROCEDURE — 80048 BASIC METABOLIC PNL TOTAL CA: CPT | Performed by: EMERGENCY MEDICINE

## 2017-12-05 PROCEDURE — 85025 COMPLETE CBC W/AUTO DIFF WBC: CPT | Performed by: EMERGENCY MEDICINE

## 2017-12-05 PROCEDURE — 96374 THER/PROPH/DIAG INJ IV PUSH: CPT

## 2017-12-05 PROCEDURE — 93976 VASCULAR STUDY: CPT

## 2017-12-05 PROCEDURE — 87086 URINE CULTURE/COLONY COUNT: CPT | Performed by: EMERGENCY MEDICINE

## 2017-12-05 PROCEDURE — 87186 SC STD MICRODIL/AGAR DIL: CPT | Performed by: EMERGENCY MEDICINE

## 2017-12-05 PROCEDURE — 25010000002 ONDANSETRON PER 1 MG: Performed by: EMERGENCY MEDICINE

## 2017-12-05 PROCEDURE — 87077 CULTURE AEROBIC IDENTIFY: CPT | Performed by: EMERGENCY MEDICINE

## 2017-12-05 PROCEDURE — 81001 URINALYSIS AUTO W/SCOPE: CPT | Performed by: EMERGENCY MEDICINE

## 2017-12-05 PROCEDURE — 96375 TX/PRO/DX INJ NEW DRUG ADDON: CPT

## 2017-12-05 RX ORDER — SODIUM CHLORIDE 0.9 % (FLUSH) 0.9 %
10 SYRINGE (ML) INJECTION AS NEEDED
Status: DISCONTINUED | OUTPATIENT
Start: 2017-12-05 | End: 2017-12-05 | Stop reason: HOSPADM

## 2017-12-05 RX ORDER — HYDROCODONE BITARTRATE AND ACETAMINOPHEN 5; 325 MG/1; MG/1
1 TABLET ORAL EVERY 6 HOURS PRN
Qty: 10 TABLET | Refills: 0 | Status: SHIPPED | OUTPATIENT
Start: 2017-12-05 | End: 2017-12-13

## 2017-12-05 RX ORDER — ONDANSETRON 2 MG/ML
4 INJECTION INTRAMUSCULAR; INTRAVENOUS ONCE
Status: COMPLETED | OUTPATIENT
Start: 2017-12-05 | End: 2017-12-05

## 2017-12-05 RX ORDER — MORPHINE SULFATE 2 MG/ML
2 INJECTION, SOLUTION INTRAMUSCULAR; INTRAVENOUS ONCE
Status: COMPLETED | OUTPATIENT
Start: 2017-12-05 | End: 2017-12-05

## 2017-12-05 RX ORDER — LEVOFLOXACIN 500 MG/1
500 TABLET, FILM COATED ORAL ONCE
Status: COMPLETED | OUTPATIENT
Start: 2017-12-05 | End: 2017-12-05

## 2017-12-05 RX ORDER — LEVOFLOXACIN 500 MG/1
500 TABLET, FILM COATED ORAL DAILY
Qty: 10 TABLET | Refills: 0 | Status: SHIPPED | OUTPATIENT
Start: 2017-12-05 | End: 2022-04-19

## 2017-12-05 RX ADMIN — LEVOFLOXACIN 500 MG: 500 TABLET, FILM COATED ORAL at 03:15

## 2017-12-05 RX ADMIN — ONDANSETRON 4 MG: 2 INJECTION, SOLUTION INTRAMUSCULAR; INTRAVENOUS at 01:50

## 2017-12-05 RX ADMIN — MORPHINE SULFATE 2 MG: 2 INJECTION, SOLUTION INTRAMUSCULAR; INTRAVENOUS at 01:50

## 2017-12-05 NOTE — ED PROVIDER NOTES
Subjective   HPI Comments: Patient presents to emergency department with right testicular pain and swelling which is going on since Saturday.  Patient is also complaining of slight discomfort with urination but denies any burning or any urethral discharge.  Patient is not sexually active.  Patient denies any problems with prostate.  Patient had prostate surgery done and had prostate cancer which is in remission.  Patient had similar episode when patient had prostate resection done in March.  Patient denies any fever or chills.  Patient pain is constant gets worse with movement and does not have any relieving factors.  Patient denies any trauma.      History provided by:  Patient      Review of Systems   Constitutional: Negative for chills, fatigue, fever and unexpected weight change.   Respiratory: Negative.    Cardiovascular: Negative.    Gastrointestinal: Negative for abdominal pain, nausea, rectal pain and vomiting.   Genitourinary: Positive for dysuria, scrotal swelling and testicular pain. Negative for discharge, flank pain, frequency, genital sores, hematuria and penile pain.   Musculoskeletal: Negative.    Allergic/Immunologic: Negative.    Neurological: Negative.    Hematological: Negative.    All other systems reviewed and are negative.      Past Medical History:   Diagnosis Date   • Dysuria    • Enlarged prostate    • Hypertension    • Urgency of urination        No Known Allergies    Past Surgical History:   Procedure Laterality Date   • CYSTOSCOPY     • CYSTOSCOPY TRANSURETHRAL RESECTION OF PROSTATE N/A 3/28/2017    Procedure: CYSTOSCOPY TRANSURETHRAL RESECTION OF PROSTATE;  Surgeon: Salbador Aguilar MD;  Location: [facility] OR;  Service:    • HEMORRHOIDECTOMY      AS TEENAGER       Family History   Problem Relation Age of Onset   • No Known Problems Father    • No Known Problems Mother        Social History     Social History   • Marital status: Single     Spouse name: N/A   • Number of children: N/A    • Years of education: N/A     Social History Main Topics   • Smoking status: Current Every Day Smoker     Packs/day: 0.50     Types: Cigarettes   • Smokeless tobacco: None   • Alcohol use 1.2 oz/week     2 Cans of beer per week      Comment: daily   • Drug use: No   • Sexual activity: Defer     Other Topics Concern   • None     Social History Narrative           Objective   Physical Exam   Constitutional: He is oriented to person, place, and time. He appears well-developed and well-nourished. No distress.   HENT:   Head: Normocephalic.   Mouth/Throat: Oropharynx is clear and moist.   Eyes: Conjunctivae are normal. Pupils are equal, round, and reactive to light.   Neck: Normal range of motion. Neck supple.   Cardiovascular: Normal rate, regular rhythm, normal heart sounds and intact distal pulses.    Pulmonary/Chest: Effort normal and breath sounds normal.   Abdominal: Soft. Bowel sounds are normal. He exhibits no distension. There is no tenderness.   Musculoskeletal: Normal range of motion. He exhibits no edema.   Neurological: He is alert and oriented to person, place, and time.   Skin: Skin is warm and dry. No rash noted.   Nursing note and vitals reviewed.      Procedures         ED Course  ED Course      Labs Reviewed   BASIC METABOLIC PANEL - Abnormal; Notable for the following:        Result Value    Glucose 111 (*)     All other components within normal limits    Narrative:     GFR Normal >60  Chronic Kidney Disease <60  Kidney Failure <15   URINALYSIS W/ CULTURE IF INDICATED - Abnormal; Notable for the following:     Appearance, UA Cloudy (*)     Glucose,  mg/dL (Trace) (*)     Ketones, UA 15 mg/dL (1+) (*)     Blood, UA Moderate (2+) (*)     Protein,  mg/dL (2+) (*)     Leuk Esterase, UA Large (3+) (*)     Nitrite, UA Positive (*)     All other components within normal limits   CBC WITH AUTO DIFFERENTIAL - Abnormal; Notable for the following:     WBC 17.67 (*)     RBC 4.50 (*)     Hemoglobin  11.9 (*)     Hematocrit 36.6 (*)     MCV 81.3 (*)     MCH 26.4 (*)     MCHC 32.5 (*)     Neutrophil % 85.0 (*)     Lymphocyte % 8.0 (*)     Neutrophils, Absolute 15.02 (*)     Immature Grans, Absolute 0.19 (*)     All other components within normal limits   URINALYSIS, MICROSCOPIC ONLY - Abnormal; Notable for the following:     RBC, UA 13-20 (*)     WBC, UA Too Numerous to Count (*)     Bacteria, UA 2+ (*)     All other components within normal limits   URINE CULTURE   RAINBOW DRAW    Narrative:     The following orders were created for panel order [facility] Draw.  Procedure                               Abnormality         Status                     ---------                               -----------         ------                     Light Blue Top[[id number]]                                    Final result               Green Top (Gel)[[id number]]                                   Final result               Lavender Top[[id number]]                                      Final result               Red Top[[id number]]                                           Final result                 Please view results for these tests on the individual orders.   CBC AND DIFFERENTIAL    Narrative:     The following orders were created for panel order CBC & Differential.  Procedure                               Abnormality         Status                     ---------                               -----------         ------                     CBC Auto Differential[[id number]]         Abnormal            Final result                 Please view results for these tests on the individual orders.   LIGHT BLUE TOP   GREEN TOP   LAVENDER TOP   RED TOP       US Testicular or Ovarian Vascular Limited    (Results Pending)     EPIDIDMITIS, NO TORSION            MDM    Final diagnoses:   Epididymitis, right            Haile Way MD  12/05/17 [id number]

## 2017-12-06 ENCOUNTER — HOSPITAL ENCOUNTER (OUTPATIENT)
Dept: HOSPITAL 58 - CAR | Age: 67
Discharge: HOME | End: 2017-12-06
Attending: INTERNAL MEDICINE

## 2017-12-06 VITALS — BODY MASS INDEX: 21.5 KG/M2

## 2017-12-06 DIAGNOSIS — R06.02: ICD-10-CM

## 2017-12-06 DIAGNOSIS — J44.9: Primary | ICD-10-CM

## 2017-12-06 LAB
BACTERIA SPEC AEROBE CULT: ABNORMAL
BACTERIA SPEC AEROBE CULT: ABNORMAL

## 2017-12-06 NOTE — CT
EXAM: CT chest without contrast 

  

HISTORY:  Shortness of breath 

  

COMPARISON:  CT chest 12/21/2010 and Chest x-ray 04/22/2010 

  

TECHNIQUE:  Serial axial images of the chest were obtained from the lung apices to the upper abdomen 
without contrast.  These were viewed in multiple planes. 

  

FINDINGS:  The thyroid is heterogeneous.  The visualized vessels are unremarkable without aneurysm or
 stenosis.  The heart is normal in size without pericardial effusion.  There are no pathologically en
larged mediastinal or hilar lymph nodes. 

  

There is no pneumothorax or pleural effusion.  There is apical predominant mild emphysema which is pr
edominantly para septal.  There is no consolidation, nodule or mass.  The airways are patent.  There 
is no endobronchial lesion identified.  There is no mass 

  

The soft tissues in the upper abdomen on this limited evaluation are unremarkable.  The osseous struc
tures are unremarkable. 

  

IMPRESSION: 

1.  No acute cardiopulmonary process, consolidation or nodule. 

2.  Unchanged mild predominant paraseptal emphysema. 

3.  Stable heterogeneous thyroid.

## 2017-12-12 NOTE — ED NOTES
"ED Call Back Questions    1. How are you doing since leaving the Emergency Department?    Doing quite a bit better  2. Do you have any questions about your discharge instructions? No     3. Have you filled your new prescriptions yet? Yes   a. Do you have any questions about those medications? No     4. Were you able to make a follow-up appointment with the physician? Yes     5. Do you have a primary care physician? Yes   a. If No, would you like for me to set you up with one? N/A  i. If Yes, “I will have our ED  give you a call right back at this number to work with you on the best time for an appointment.”    6. We are always looking to get better at what we do. Do you have any suggestions for what we can do to be even better? No   a. If Yes, \"Thank you for sharing your concerns. I apologize. I will follow up with our manager and patient . Would you like someone to call you back?\" No     7. Is there anything else I can do for you? No   Everything was good     Gian Huynh  12/12/17 1027    "

## 2017-12-13 ENCOUNTER — OFFICE VISIT (OUTPATIENT)
Dept: UROLOGY | Facility: CLINIC | Age: 67
End: 2017-12-13

## 2017-12-13 VITALS — WEIGHT: 145.8 LBS | TEMPERATURE: 98 F | HEIGHT: 69 IN | BODY MASS INDEX: 21.59 KG/M2

## 2017-12-13 DIAGNOSIS — N13.8 BPH WITH URINARY OBSTRUCTION: ICD-10-CM

## 2017-12-13 DIAGNOSIS — C61 CANCER OF PROSTATE (HCC): ICD-10-CM

## 2017-12-13 DIAGNOSIS — N40.1 BPH WITH URINARY OBSTRUCTION: ICD-10-CM

## 2017-12-13 DIAGNOSIS — N45.3 ORCHITIS AND EPIDIDYMITIS: Primary | ICD-10-CM

## 2017-12-13 LAB
BILIRUB BLD-MCNC: NEGATIVE MG/DL
CLARITY, POC: CLEAR
COLOR UR: YELLOW
GLUCOSE UR STRIP-MCNC: NEGATIVE MG/DL
KETONES UR QL: NEGATIVE
LEUKOCYTE EST, POC: NEGATIVE
NITRITE UR-MCNC: NEGATIVE MG/ML
PH UR: 6.5 [PH] (ref 5–8)
PROT UR STRIP-MCNC: NEGATIVE MG/DL
RBC # UR STRIP: NEGATIVE /UL
SP GR UR: 1 (ref 1–1.03)
UROBILINOGEN UR QL: NORMAL

## 2017-12-13 PROCEDURE — 99214 OFFICE O/P EST MOD 30 MIN: CPT | Performed by: UROLOGY

## 2017-12-13 RX ORDER — LEVOFLOXACIN 500 MG/1
500 TABLET, FILM COATED ORAL DAILY
Qty: 7 TABLET | Refills: 0 | Status: SHIPPED | OUTPATIENT
Start: 2017-12-13 | End: 2017-12-20

## 2017-12-13 NOTE — PROGRESS NOTES
Subjective    Mr. Mercado is 67 y.o. male    Chief Complaint: Testicular Swelling    History of Present Illness     Genital Lesion  Patient is here for a genital lesion.  The location of the lesion is right testicle.  The lesion has been present for 2week(s). The description is swelling.  The courseimproving.  Treatment response is levaquin.  Associated symptoms include pain.     Prostate Cancer  Pt. presents with history of prostate cancer diagnosed in March 2017. Current disease state islow risk disease Pt. 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  Patient has not  received  XRT completed in March 2017 .He is on nothing for management of prostate cancer.    Pt.  having 0 ppd incontinence.  Associated voiding symptoms include No evidence of voiding symptoms .    There has been no bone pain, weight loss, abdominal pain, back pain, or gross hematuria.   Most recent PSA 1.2 (previously 4.6).    The following portions of the patient's history were reviewed and updated as appropriate: allergies, current medications, past family history, past medical history, past social history, past surgical history and problem list.    Review of Systems   Constitutional: Negative for chills and fever.   Gastrointestinal: Negative for abdominal pain, anal bleeding and blood in stool.   Genitourinary: Positive for scrotal swelling (right) and testicular pain (right). Negative for flank pain and hematuria.         Current Outpatient Prescriptions:   •  levoFLOXacin (LEVAQUIN) 500 MG tablet, Take 1 tablet by mouth Daily., Disp: 10 tablet, Rfl: 0  •  levoFLOXacin (LEVAQUIN) 500 MG tablet, Take 1 tablet by mouth Daily for 7 days., Disp: 7 tablet, Rfl: 0    Past Medical History:   Diagnosis Date   • Dysuria    • Enlarged prostate    • Hypertension    • Urgency of urination        Past Surgical History:   Procedure Laterality  "Date   • CYSTOSCOPY     • CYSTOSCOPY TRANSURETHRAL RESECTION OF PROSTATE N/A 3/28/2017    Procedure: CYSTOSCOPY TRANSURETHRAL RESECTION OF PROSTATE;  Surgeon: Salbador Aguilar MD;  Location: [facility] OR;  Service:    • HEMORRHOIDECTOMY      AS TEENAGER       Social History     Social History   • Marital status: Single     Spouse name: N/A   • Number of children: N/A   • Years of education: N/A     Social History Main Topics   • Smoking status: Current Every Day Smoker     Packs/day: 0.50     Types: Cigarettes   • Smokeless tobacco: None   • Alcohol use 1.2 oz/week     2 Cans of beer per week      Comment: daily   • Drug use: No   • Sexual activity: Defer     Other Topics Concern   • None     Social History Narrative       Family History   Problem Relation Age of Onset   • No Known Problems Father    • No Known Problems Mother        Objective    Temp 98 °F (36.7 °C)  Ht 175.3 cm (69\")  Wt 66.1 kg (145 lb 12.8 oz)  BMI 21.53 kg/m2    Physical Exam   Constitutional: He is oriented to person, place, and time. He appears well-developed and well-nourished.   Pulmonary/Chest: Effort normal.   Abdominal: Soft. He exhibits no distension and no mass. There is no tenderness. There is no rebound and no guarding. No hernia.   Genitourinary: Penis normal. Rectal exam shows no mass, no tenderness and anal tone normal. Enlarged: for the age of the patient. Right testis shows no mass, no swelling and no tenderness. Left testis shows no mass, no swelling and no tenderness. No hypospadias. No discharge found.   Genitourinary Comments:  The urethral meatus normal in position without evidence of stricture. Right epididymis and testicle swollen and tender.  Left Epididymis without mass or tenderness. Vas Deferens is palpably normal.   Neurological: He is alert and oriented to person, place, and time.   Vitals reviewed.          Results for orders placed or performed in visit on 12/13/17   POC Urinalysis Dipstick, Automated "   Result Value Ref Range    Color Yellow Yellow, Straw, Dark Yellow, Stacy    Clarity, UA Clear Clear    Glucose, UA Negative Negative, 1000 mg/dL (3+) mg/dL    Bilirubin Negative Negative    Ketones, UA Negative Negative    Specific Gravity  1.005 1.005 - 1.030    Blood, UA Negative Negative    pH, Urine 6.5 5.0 - 8.0    Protein, POC Negative Negative mg/dL    Urobilinogen, UA Normal Normal    Leukocytes Negative Negative    Nitrite, UA Negative Negative     Assessment and Plan    Diagnoses and all orders for this visit:    Orchitis and epididymitis  -     POC Urinalysis Dipstick, Automated  -     levoFLOXacin (LEVAQUIN) 500 MG tablet; Take 1 tablet by mouth Daily for 7 days.    BPH with urinary obstruction    Cancer of prostate        Patient was seen one week ago the emergency room for right epididymal swelling is diagnosed with an Escherichia coli urinary tract infection and placed on Levaquin.    On the give another week's worth of Levaquin as he was only given a week by the emergency room.  He will keep his regular scheduled follow-up in April or we will likely decide to do a prostate biopsy on him for confirmation reasons.

## 2018-03-01 ENCOUNTER — HOSPITAL ENCOUNTER (OUTPATIENT)
Dept: HOSPITAL 58 - LAB | Age: 68
Discharge: HOME | End: 2018-03-01
Attending: INTERNAL MEDICINE
Payer: COMMERCIAL

## 2018-03-01 VITALS — BODY MASS INDEX: 21.5 KG/M2

## 2018-03-01 DIAGNOSIS — D50.8: ICD-10-CM

## 2018-03-01 DIAGNOSIS — I10: Primary | ICD-10-CM

## 2018-03-01 PROCEDURE — 80053 COMPREHEN METABOLIC PANEL: CPT

## 2018-03-01 PROCEDURE — 80061 LIPID PANEL: CPT

## 2018-03-01 PROCEDURE — 85025 COMPLETE CBC W/AUTO DIFF WBC: CPT

## 2018-03-01 PROCEDURE — 36415 COLL VENOUS BLD VENIPUNCTURE: CPT

## 2018-03-01 PROCEDURE — 84443 ASSAY THYROID STIM HORMONE: CPT

## 2018-04-18 DIAGNOSIS — N40.1 BPH WITH URINARY OBSTRUCTION: Primary | ICD-10-CM

## 2018-04-18 DIAGNOSIS — N13.8 BPH WITH URINARY OBSTRUCTION: Primary | ICD-10-CM

## 2018-04-18 LAB — PSA SERPL-MCNC: 1.47 NG/ML (ref 0–4)

## 2018-04-23 ENCOUNTER — RESULTS ENCOUNTER (OUTPATIENT)
Dept: UROLOGY | Facility: CLINIC | Age: 68
End: 2018-04-23

## 2018-04-23 DIAGNOSIS — C61 CANCER OF PROSTATE (HCC): ICD-10-CM

## 2019-03-20 ENCOUNTER — HOSPITAL ENCOUNTER (OUTPATIENT)
Dept: HOSPITAL 58 - LAB | Age: 69
Discharge: HOME | End: 2019-03-20
Attending: NURSE PRACTITIONER
Payer: COMMERCIAL

## 2019-03-20 VITALS — BODY MASS INDEX: 21.5 KG/M2

## 2019-03-20 DIAGNOSIS — D50.8: Primary | ICD-10-CM

## 2019-03-20 DIAGNOSIS — J44.9: ICD-10-CM

## 2019-03-20 DIAGNOSIS — I10: ICD-10-CM

## 2019-03-20 PROCEDURE — 36415 COLL VENOUS BLD VENIPUNCTURE: CPT

## 2019-03-20 PROCEDURE — 80053 COMPREHEN METABOLIC PANEL: CPT

## 2019-03-20 PROCEDURE — 80061 LIPID PANEL: CPT

## 2019-03-20 PROCEDURE — 83540 ASSAY OF IRON: CPT

## 2019-03-20 PROCEDURE — 85025 COMPLETE CBC W/AUTO DIFF WBC: CPT

## 2019-03-20 PROCEDURE — 83550 IRON BINDING TEST: CPT

## 2019-03-20 PROCEDURE — 82728 ASSAY OF FERRITIN: CPT

## 2022-02-25 ENCOUNTER — OFFICE VISIT (OUTPATIENT)
Dept: GASTROENTEROLOGY | Facility: CLINIC | Age: 72
End: 2022-02-25

## 2022-02-25 VITALS
HEART RATE: 91 BPM | OXYGEN SATURATION: 99 % | HEIGHT: 68 IN | TEMPERATURE: 96.9 F | WEIGHT: 139 LBS | DIASTOLIC BLOOD PRESSURE: 80 MMHG | SYSTOLIC BLOOD PRESSURE: 164 MMHG | BODY MASS INDEX: 21.07 KG/M2

## 2022-02-25 DIAGNOSIS — Z01.818 PREOPERATIVE TESTING: Primary | ICD-10-CM

## 2022-02-25 DIAGNOSIS — R19.5 HEME POSITIVE STOOL: ICD-10-CM

## 2022-02-25 DIAGNOSIS — D50.9 IRON DEFICIENCY ANEMIA, UNSPECIFIED IRON DEFICIENCY ANEMIA TYPE: Primary | ICD-10-CM

## 2022-02-25 PROBLEM — D64.9 ANEMIA: Status: ACTIVE | Noted: 2022-02-25

## 2022-02-25 PROCEDURE — 99204 OFFICE O/P NEW MOD 45 MIN: CPT | Performed by: NURSE PRACTITIONER

## 2022-02-25 NOTE — PROGRESS NOTES
[facility] GASTROENTEROLOGY - OFFICE NOTE    2/25/2022    Paul Mercado   1950    Primary Physician: Dr. Raffi Villafuerte     Referring Provider: Nicole ALEMAN        Chief Complaint   Patient presents with   • GI Bleeding   heme pos stool  anemia      HISTORY OF PRESENT ILLNESS:     Paul Mercado is a 71 y.o. male presents with iron deficiency anemia and heme pos stool.     Anemia  This is new per patient. See labs below. No sign of active GI bleeding. No history of blood transfusion/donation.  No change in bowel habits.        Heme pos stool   No sign of active GI bleeding. No upper gi complaints.           I reviewed labs done at [place] 1-4-22 hgb 12.0, bun normal, iron 41. 1-12-22 heme pos stool.     No recent colonoscopy.  No personal history of colon polyps or colon cancer.   No family history of colon polyps/colon cancer.       Past Medical History:   Diagnosis Date   • Dysuria    • Enlarged prostate    • Hypertension    • Urgency of urination        Past Surgical History:   Procedure Laterality Date   • CYSTOSCOPY     • CYSTOSCOPY TRANSURETHRAL RESECTION OF PROSTATE N/A 3/28/2017    Procedure: CYSTOSCOPY TRANSURETHRAL RESECTION OF PROSTATE;  Surgeon: Salbador Aguilar MD;  Location: [facility] OR;  Service:    • HEMORRHOIDECTOMY      AS TEENAGER       Outpatient Medications Marked as Taking for the 2/25/22 encounter (Office Visit) with Isi Davis APRN   Medication Sig Dispense Refill   • amLODIPine (NORVASC) 5 MG tablet Take 5 mg by mouth Daily.     • ferrous sulfate 324 (65 Fe) MG tablet delayed-release EC tablet Take 324 mg by mouth Every Other Day.         No Known Allergies    Social History     Socioeconomic History   • Marital status: Single   Tobacco Use   • Smoking status: Current Every Day Smoker     Packs/day: 0.50     Types: Cigarettes   • Smokeless tobacco: Never Used   Substance and Sexual Activity   • Alcohol use: Yes     Alcohol/week: 2.0 standard drinks     Types: 2  "Cans of beer per week     Comment: occ   • Drug use: No   • Sexual activity: Defer       Family History   Problem Relation Age of Onset   • No Known Problems Father    • No Known Problems Mother    • Colon cancer Neg Hx    • Colon polyps Neg Hx        Review of Systems   Constitutional: Negative for chills, fever and unexpected weight change.   Respiratory: Negative for shortness of breath and wheezing.    Cardiovascular: Negative for chest pain and palpitations.   Gastrointestinal: Negative for abdominal distention, abdominal pain, constipation, diarrhea, nausea and vomiting.        Vitals:    02/25/22 0858   BP: 164/80   Pulse: 91   Temp: 96.9 °F (36.1 °C)   SpO2: 99%   Weight: 63 kg (139 lb)   Height: 172.7 cm (68\")      Body mass index is 21.13 kg/m².    Physical Exam  Vitals reviewed.   Constitutional:       General: He is not in acute distress.  Cardiovascular:      Rate and Rhythm: Normal rate and regular rhythm.      Heart sounds: Normal heart sounds.   Pulmonary:      Effort: Pulmonary effort is normal.      Breath sounds: Normal breath sounds.   Abdominal:      General: Bowel sounds are normal. There is no distension.      Palpations: Abdomen is soft.      Tenderness: There is no abdominal tenderness.   Skin:     General: Skin is warm and dry.   Neurological:      Mental Status: He is alert.                 ASSESSMENT AND PLAN    Assessment/Plan     Diagnoses and all orders for this visit:    1. Iron deficiency anemia, unspecified iron deficiency anemia type (Primary)  -     Case Request; Standing  -     Case Request    2. Heme positive stool  -     Case Request; Standing  -     Case Request    Other orders  -     Follow Anesthesia Guidelines / Protocol; Future  -     Obtain Informed Consent; Future    Plan for EGD and colonoscopy. Use miralax prep.               ESOPHAGOGASTRODUODENOSCOPY WITH ANESTHESIA (N/A), COLONOSCOPY WITH ANESTHESIA (N/A)   Risk, benefits, and alternatives of endoscopy were " explained in full.  They understand that there is a risk of bleeding, perforation, and infection.  The risk of perforation goes up with esophageal dilation.  Other options to evaluate UGI complaints could involve barium swallow or UGI series, but these would be diagnostic tests only.  Patient was given time to ask questions.  I answered them to their satisfaction and they are agreeable to proceedingAll risks, benefits, alternatives, and indications of colonoscopy procedure have been discussed with the patient. Risks to include perforation of the colon requiring possible surgery or colostomy, risk of bleeding from biopsies or removal of colon tissue, possibility of missing a colon polyp or cancer, or adverse drug reaction.  Benefits to include the diagnosis and management of disease of the colon and rectum. Alternatives to include barium enema, radiographic evaluation, lab testing or no intervention. Pt verbalizes understanding and agrees.                        Isi Davis, LOVE

## 2022-03-18 ENCOUNTER — TELEPHONE (OUTPATIENT)
Dept: GASTROENTEROLOGY | Facility: CLINIC | Age: 72
End: 2022-03-18

## 2022-04-19 ENCOUNTER — OFFICE VISIT (OUTPATIENT)
Dept: NEUROLOGY | Facility: CLINIC | Age: 72
End: 2022-04-19

## 2022-04-19 VITALS
BODY MASS INDEX: 20.46 KG/M2 | OXYGEN SATURATION: 99 % | HEART RATE: 99 BPM | DIASTOLIC BLOOD PRESSURE: 88 MMHG | WEIGHT: 135 LBS | SYSTOLIC BLOOD PRESSURE: 172 MMHG | HEIGHT: 68 IN

## 2022-04-19 DIAGNOSIS — G20 PARKINSON DISEASE: Primary | ICD-10-CM

## 2022-04-19 PROCEDURE — 99204 OFFICE O/P NEW MOD 45 MIN: CPT | Performed by: PSYCHIATRY & NEUROLOGY

## 2022-04-19 NOTE — PATIENT INSTRUCTIONS
Start Sinemet (carbidopa/Levodopa) 1 pill twice a day for 1 day.  Then take 1 pill three times a day.  Then Elizabeth will call and check on you in about a week.   Come back

## 2022-04-19 NOTE — PROGRESS NOTES
"Chief Complaint  Tremors (Pt complains of right hand tremor started around thanksgiving. Denies left hand tremor,denies dyspahgia,denies urinary incont.)    Madiha Mercado presents to [facility]    Patient is a 71-year-old male who presents for evaluation of tremor.  He states this has been going on since Thanksgiving 2021.  He states it bothers him if he sitting still not if he is holding something.  His sister had a tremor and was diagnosed with Parkinson's disease and is on Sinemet.  The tremor is in his right hand and he is right-handed.  He does state he has trouble with walking and can be shaky.  He has had no falls and does not lose his balance.  However his nephew says he walks slower than he used to.  He does get dizzy when he stands up at times.  He notices the tremor is worse when he is cold.  He has not noticed any tremor of his legs or of his head.  He also tells me that people have told me he likely has nightmares and what walk a lot and moving his sleep.        Past Medical History:   Diagnosis Date   • Dysuria    • Enlarged prostate    • Hypertension    • Urgency of urination           Current Outpatient Medications:   •  amLODIPine (NORVASC) 5 MG tablet, Take 5 mg by mouth Daily., Disp: , Rfl:   •  ferrous sulfate 324 (65 Fe) MG tablet delayed-release EC tablet, Take 324 mg by mouth Every Other Day., Disp: , Rfl:        Objective   Vital Signs:   /88 (BP Location: Left arm, Patient Position: Sitting, Cuff Size: Adult)   Pulse 99   Ht 172.7 cm (68\")   Wt 61.2 kg (135 lb)   SpO2 99%   BMI 20.53 kg/m²     Physical Exam  Constitutional:       General: He is awake.   Eyes:      Extraocular Movements: Extraocular movements intact.      Pupils: Pupils are equal, round, and reactive to light.   Neurological:      Mental Status: He is alert.      Deep Tendon Reflexes: Strength normal and reflexes are normal and symmetric.   Psychiatric:         " Speech: Speech normal.        Neurological Exam  Mental Status  Awake and alert. Oriented to person, place and time. Speech is normal. Language is fluent with no aphasia.    Cranial Nerves  CN II: Visual fields full to confrontation.  CN III, IV, VI: Extraocular movements intact bilaterally. Pupils equal round and reactive to light bilaterally.  CN V: Facial sensation is normal.  CN VII: Full and symmetric facial movement.  CN IX, X: Palate elevates symmetrically  CN XI: Shoulder shrug strength is normal.  CN XII: Tongue midline without atrophy or fasciculations.    Motor  Normal muscle bulk throughout. Normal muscle tone. No abnormal involuntary movements. Strength is 5/5 throughout all four extremities.  Right rest tremor  Bilateral intention tremor  No postural tremor   Minimal rigidity and bradykinesia on the right more than the left  Hypomimia.    Sensory  Light touch is normal in upper and lower extremities.     Reflexes  Deep tendon reflexes are 2+ and symmetric in all four extremities.  Glabellar tap absent.    Coordination  Right: Finger-to-nose normal.Left: Finger-to-nose normal.    Gait   Normal pull test.  No shuffling  Reduced arm swing bilaterally.      Result Review :                     Assessment and Plan   71-year-old male with several months of right arm rest tremor.  He does have some minimal features of Parkinson's on exam today.  I discussed this with him at length.  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  We will do a trial of levodopa to see if these symptoms improve.  If not, we can consider a trial of something like primidone for his tremor.    Plan:    1.  Start Sinemet 25/100 1 tab 3 times daily.  He will call him for an update and we can increase to 2 tabs 3 times daily if needed.  2.  Follow-up 6 weeks.              Follow Up   No follow-ups on file.  Patient was given instructions and counseling regarding his condition or for health maintenance advice. Please  see specific information pulled into the AVS if appropriate.

## 2022-06-03 ENCOUNTER — OFFICE VISIT (OUTPATIENT)
Dept: NEUROLOGY | Facility: CLINIC | Age: 72
End: 2022-06-03

## 2022-06-03 VITALS
BODY MASS INDEX: 20.25 KG/M2 | HEART RATE: 69 BPM | SYSTOLIC BLOOD PRESSURE: 150 MMHG | OXYGEN SATURATION: 99 % | WEIGHT: 133.6 LBS | HEIGHT: 68 IN | DIASTOLIC BLOOD PRESSURE: 78 MMHG

## 2022-06-03 DIAGNOSIS — G20 PARKINSON DISEASE: ICD-10-CM

## 2022-06-03 PROCEDURE — 99214 OFFICE O/P EST MOD 30 MIN: CPT | Performed by: PSYCHIATRY & NEUROLOGY

## 2022-06-03 NOTE — PROGRESS NOTES
"Chief Complaint  Parkinson's Disease (Patient states he is doing well. Denies falls,dysphagia, or urinary troubles, occ constipation but is doing better with OTC stool softener. )    Subjective        Paul Mercado presents to [facility]    Patient is a 71-year-old male who presents for follow-up.  At last visit I started him on Sinemet.  He states this is really helped his tremor.  He has not noticed any benefit with his walking but never really noticed any trouble with this.        Past Medical History:   Diagnosis Date   • Dysuria    • Enlarged prostate    • Hypertension    • Urgency of urination           Current Outpatient Medications:   •  amLODIPine (NORVASC) 5 MG tablet, Take 5 mg by mouth Daily., Disp: , Rfl:   •  carbidopa-levodopa (SINEMET)  MG per tablet, Take 1 tablet by mouth 3 (Three) Times a Day for 90 days., Disp: 90 tablet, Rfl: 2       Objective   Vital Signs:   /78 (BP Location: Left arm, Patient Position: Sitting, Cuff Size: Adult)   Pulse 69   Ht 172.7 cm (68\")   Wt 60.6 kg (133 lb 9.6 oz)   SpO2 99%   BMI 20.31 kg/m²     Physical Exam   Neurological Exam     Motor   Strength is 5/5 throughout all four extremities.  Right rest tremor  Bilateral intention tremor  No postural tremor   Minimal rigidity and bradykinesia on the right more than the left  Hypomimia.  Reduced arm swing bilaterally    Result Review :                     Assessment and Plan   71-year-old male with several months of right arm rest tremor.  Very minimal features of Parkinson's on exam.  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  We did a trial of Sinemet and he noticed significant benefit.  So we will continue this at this time.  He would like to continue taking only 1 tab 3 times a day.    Plan:    1.  Continue Sinemet 25/100 1 tab 3 times daily.  2.  Follow-up 2 months.  Call sooner with any issues.        Follow Up   No " follow-ups on file.  Patient was given instructions and counseling regarding his condition or for health maintenance advice. Please see specific information pulled into the AVS if appropriate.

## 2022-08-01 ENCOUNTER — OFFICE VISIT (OUTPATIENT)
Dept: GASTROENTEROLOGY | Facility: CLINIC | Age: 72
End: 2022-08-01

## 2022-08-01 ENCOUNTER — TELEPHONE (OUTPATIENT)
Dept: GASTROENTEROLOGY | Facility: CLINIC | Age: 72
End: 2022-08-01

## 2022-08-01 VITALS
BODY MASS INDEX: 19.4 KG/M2 | SYSTOLIC BLOOD PRESSURE: 150 MMHG | OXYGEN SATURATION: 98 % | HEIGHT: 68 IN | WEIGHT: 128 LBS | TEMPERATURE: 97.1 F | DIASTOLIC BLOOD PRESSURE: 78 MMHG | HEART RATE: 102 BPM

## 2022-08-01 DIAGNOSIS — K59.09 CHRONIC CONSTIPATION: Primary | ICD-10-CM

## 2022-08-01 DIAGNOSIS — R10.32 LLQ PAIN: ICD-10-CM

## 2022-08-01 DIAGNOSIS — D50.9 IRON DEFICIENCY ANEMIA, UNSPECIFIED IRON DEFICIENCY ANEMIA TYPE: ICD-10-CM

## 2022-08-01 DIAGNOSIS — R19.5 HEME POSITIVE STOOL: ICD-10-CM

## 2022-08-01 PROCEDURE — 99214 OFFICE O/P EST MOD 30 MIN: CPT | Performed by: NURSE PRACTITIONER

## 2022-08-01 RX ORDER — POLYETHYLENE GLYCOL 3350, SODIUM SULFATE ANHYDROUS, SODIUM BICARBONATE, SODIUM CHLORIDE, POTASSIUM CHLORIDE 236; 22.74; 6.74; 5.86; 2.97 G/4L; G/4L; G/4L; G/4L; G/4L
4 POWDER, FOR SOLUTION ORAL ONCE
Qty: 4000 ML | Refills: 0 | Status: SHIPPED | OUTPATIENT
Start: 2022-08-01 | End: 2022-08-01

## 2022-08-01 NOTE — H&P (VIEW-ONLY)
[facility] GASTROENTEROLOGY - OFFICE NOTE    8/1/2022    Paul Mercado   1950    Primary Physician: Murtaza Gregg MD    Chief Complaint   Patient presents with   • Constipation   llq pain.       HISTORY OF PRESENT ILLNESS:       Paul Mercado is a 71 y.o. male presents with chronic constipation that is worsening. Uses dulcolax that helps. No rectal bleeding. No black stool.         LLQ pain  Intermittent for 1 week. Described as a pressure and cramping. Improves after bm.         He went to the Emergency Room last week at [place]. Had ct abdomen. Was told constipated and to take a laxative.         He has appointment with pcp next week.   He was scheduled for colonoscopy 2/2022 but canceled.     ==================================================================  Ov  2-25-22 HPI Paul Mercado is a 71 y.o. male presents with iron deficiency anemia and heme pos stool.      Anemia  This is new per patient. See labs below. No sign of active GI bleeding. No history of blood transfusion/donation.  No change in bowel habits.         Heme pos stool   No sign of active GI bleeding. No upper gi complaints.               I reviewed labs done at [place] 1-4-22 hgb 12.0, bun normal, iron 41. 1-12-22 heme pos stool.      No recent colonoscopy.  No personal history of colon polyps or colon cancer.   No family history of colon polyps/colon cancer.      Past Medical History:   Diagnosis Date   • Dysuria    • Enlarged prostate    • Hypertension    • Urgency of urination        Past Surgical History:   Procedure Laterality Date   • CYSTOSCOPY     • CYSTOSCOPY TRANSURETHRAL RESECTION OF PROSTATE N/A 3/28/2017    Procedure: CYSTOSCOPY TRANSURETHRAL RESECTION OF PROSTATE;  Surgeon: Salbador Aguilar MD;  Location: [facility] OR;  Service:    • HEMORRHOIDECTOMY      AS TEENAGER       Outpatient Medications Marked as Taking for the 8/1/22 encounter (Office Visit) with Isi Davis APRN   Medication Sig Dispense Refill  "  • amLODIPine (NORVASC) 5 MG tablet Take 5 mg by mouth Daily.     • carbidopa-levodopa (SINEMET)  MG per tablet Take 1 tablet by mouth 3 (Three) Times a Day for 90 days. 90 tablet 2       No Known Allergies    Social History     Socioeconomic History   • Marital status: Single   Tobacco Use   • Smoking status: Current Every Day Smoker     Packs/day: 0.50     Types: Cigarettes   • Smokeless tobacco: Never Used   Substance and Sexual Activity   • Alcohol use: Yes     Alcohol/week: 2.0 standard drinks     Types: 2 Cans of beer per week     Comment: occ   • Drug use: No   • Sexual activity: Defer       Family History   Problem Relation Age of Onset   • No Known Problems Father    • No Known Problems Mother    • Colon cancer Neg Hx    • Colon polyps Neg Hx        Review of Systems   Constitutional: Positive for unexpected weight change. Negative for chills and fever.   Respiratory: Negative for shortness of breath and wheezing.    Cardiovascular: Negative for chest pain and palpitations.   Gastrointestinal: Positive for abdominal pain and constipation. Negative for abdominal distention, anal bleeding, blood in stool, diarrhea, nausea and vomiting.        Vitals:    08/01/22 0756   BP: 150/78   Pulse: 102   Temp: 97.1 °F (36.2 °C)   SpO2: 98%   Weight: 58.1 kg (128 lb)   Height: 172.7 cm (68\")      Body mass index is 19.46 kg/m².    Physical Exam  Vitals reviewed.   Constitutional:       General: He is not in acute distress.  Cardiovascular:      Rate and Rhythm: Normal rate and regular rhythm.      Heart sounds: Normal heart sounds.   Pulmonary:      Effort: Pulmonary effort is normal.      Breath sounds: Normal breath sounds.   Abdominal:      General: Bowel sounds are normal. There is no distension.      Palpations: Abdomen is soft.      Tenderness: There is no abdominal tenderness.   Skin:     General: Skin is warm and dry.   Neurological:      Mental Status: He is alert.                 ASSESSMENT AND " PLAN    Assessment & Plan     Diagnoses and all orders for this visit:    1. Chronic constipation (Primary)  -     Case Request; Standing  -     Case Request    2. LLQ pain  Comments:  intermittent.   Orders:  -     Case Request; Standing  -     Case Request    3. Iron deficiency anemia, unspecified iron deficiency anemia type  -     Case Request; Standing  -     Case Request    4. Heme positive stool    Other orders  -     Follow Anesthesia Guidelines / Protocol; Future  -     Obtain Informed Consent; Future  -     polyethylene glycol (Golytely) 236 g solution; Take 4,000 mL by mouth 1 (One) Time for 1 dose. Take as directed per instruction sheet.  Dispense: 4000 mL; Refill: 0         In regards to constipation, recommend increase water intake/daily fiber supplement/exercise. We also discussed trial of miralax and adjust accordingly.         In regards to llq pain, I question if this is due for constipation. Await colonoscopy findings.           He was here 2/2022 for eval of heme pos stool and CHAVEZ. He canceled colonoscopy at that time.        I will request records from [place] ER visit last week. ( labs and Ct abdomen).         COLONOSCOPY WITH ANESTHESIA (N/A)   All risks, benefits, alternatives, and indications of colonoscopy procedure have been discussed with the patient. Risks to include perforation of the colon requiring possible surgery or colostomy, risk of bleeding from biopsies or removal of colon tissue, possibility of missing a colon polyp or cancer, or adverse drug reaction.  Benefits to include the diagnosis and management of disease of the colon and rectum. Alternatives to include barium enema, radiographic evaluation, lab testing or no intervention. Pt verbalizes understanding and agrees.                    LOVE Crowley    Received records:  CT abdomen/pelvis without renal stone prot 7-20-22 , noted large amount retained fecal material sigmoid colon, cannot exclude circumferential rectal  wall thickening.     He was seen here February 2022 for heme positive stool and iron deficiency anemia.  He canceled EGD and colonoscopy at that time.  We will add EGD to be done at same time as colonoscopy.    Received labs done 8-1-22 at [facility],   hgb 11.5, mcv normal,  Wbc 11.2. iron 27, fe sat 9,     Labs 7-20-22 hgb 10.9, mcv normal,

## 2022-08-01 NOTE — TELEPHONE ENCOUNTER
Please call and schedule colonoscopy for here [facility] [place]. Also please request labs and ct abdomen from [place].

## 2022-08-01 NOTE — PROGRESS NOTES
[facility] GASTROENTEROLOGY - OFFICE NOTE    8/1/2022    Paul Mercado   1950    Primary Physician: Murtaza Gregg MD    Chief Complaint   Patient presents with   • Constipation   llq pain.       HISTORY OF PRESENT ILLNESS:       Paul Mercado is a 71 y.o. male presents with chronic constipation that is worsening. Uses dulcolax that helps. No rectal bleeding. No black stool.         LLQ pain  Intermittent for 1 week. Described as a pressure and cramping. Improves after bm.         He went to the Emergency Room last week at [place]. Had ct abdomen. Was told constipated and to take a laxative.         He has appointment with pcp next week.   He was scheduled for colonoscopy 2/2022 but canceled.     ==================================================================  Ov  2-25-22 HPI Paul Mercado is a 71 y.o. male presents with iron deficiency anemia and heme pos stool.      Anemia  This is new per patient. See labs below. No sign of active GI bleeding. No history of blood transfusion/donation.  No change in bowel habits.         Heme pos stool   No sign of active GI bleeding. No upper gi complaints.               I reviewed labs done at [place] 1-4-22 hgb 12.0, bun normal, iron 41. 1-12-22 heme pos stool.      No recent colonoscopy.  No personal history of colon polyps or colon cancer.   No family history of colon polyps/colon cancer.      Past Medical History:   Diagnosis Date   • Dysuria    • Enlarged prostate    • Hypertension    • Urgency of urination        Past Surgical History:   Procedure Laterality Date   • CYSTOSCOPY     • CYSTOSCOPY TRANSURETHRAL RESECTION OF PROSTATE N/A 3/28/2017    Procedure: CYSTOSCOPY TRANSURETHRAL RESECTION OF PROSTATE;  Surgeon: Salbador Aguilar MD;  Location: [facility] OR;  Service:    • HEMORRHOIDECTOMY      AS TEENAGER       Outpatient Medications Marked as Taking for the 8/1/22 encounter (Office Visit) with Isi Davis APRN   Medication Sig Dispense Refill  "  • amLODIPine (NORVASC) 5 MG tablet Take 5 mg by mouth Daily.     • carbidopa-levodopa (SINEMET)  MG per tablet Take 1 tablet by mouth 3 (Three) Times a Day for 90 days. 90 tablet 2       No Known Allergies    Social History     Socioeconomic History   • Marital status: Single   Tobacco Use   • Smoking status: Current Every Day Smoker     Packs/day: 0.50     Types: Cigarettes   • Smokeless tobacco: Never Used   Substance and Sexual Activity   • Alcohol use: Yes     Alcohol/week: 2.0 standard drinks     Types: 2 Cans of beer per week     Comment: occ   • Drug use: No   • Sexual activity: Defer       Family History   Problem Relation Age of Onset   • No Known Problems Father    • No Known Problems Mother    • Colon cancer Neg Hx    • Colon polyps Neg Hx        Review of Systems   Constitutional: Positive for unexpected weight change. Negative for chills and fever.   Respiratory: Negative for shortness of breath and wheezing.    Cardiovascular: Negative for chest pain and palpitations.   Gastrointestinal: Positive for abdominal pain and constipation. Negative for abdominal distention, anal bleeding, blood in stool, diarrhea, nausea and vomiting.        Vitals:    08/01/22 0756   BP: 150/78   Pulse: 102   Temp: 97.1 °F (36.2 °C)   SpO2: 98%   Weight: 58.1 kg (128 lb)   Height: 172.7 cm (68\")      Body mass index is 19.46 kg/m².    Physical Exam  Vitals reviewed.   Constitutional:       General: He is not in acute distress.  Cardiovascular:      Rate and Rhythm: Normal rate and regular rhythm.      Heart sounds: Normal heart sounds.   Pulmonary:      Effort: Pulmonary effort is normal.      Breath sounds: Normal breath sounds.   Abdominal:      General: Bowel sounds are normal. There is no distension.      Palpations: Abdomen is soft.      Tenderness: There is no abdominal tenderness.   Skin:     General: Skin is warm and dry.   Neurological:      Mental Status: He is alert.                 ASSESSMENT AND " PLAN    Assessment & Plan     Diagnoses and all orders for this visit:    1. Chronic constipation (Primary)  -     Case Request; Standing  -     Case Request    2. LLQ pain  Comments:  intermittent.   Orders:  -     Case Request; Standing  -     Case Request    3. Iron deficiency anemia, unspecified iron deficiency anemia type  -     Case Request; Standing  -     Case Request    4. Heme positive stool    Other orders  -     Follow Anesthesia Guidelines / Protocol; Future  -     Obtain Informed Consent; Future  -     polyethylene glycol (Golytely) 236 g solution; Take 4,000 mL by mouth 1 (One) Time for 1 dose. Take as directed per instruction sheet.  Dispense: 4000 mL; Refill: 0         In regards to constipation, recommend increase water intake/daily fiber supplement/exercise. We also discussed trial of miralax and adjust accordingly.         In regards to llq pain, I question if this is due for constipation. Await colonoscopy findings.           He was here 2/2022 for eval of heme pos stool and CHAVEZ. He canceled colonoscopy at that time.        I will request records from [place] ER visit last week. ( labs and Ct abdomen).         COLONOSCOPY WITH ANESTHESIA (N/A)   All risks, benefits, alternatives, and indications of colonoscopy procedure have been discussed with the patient. Risks to include perforation of the colon requiring possible surgery or colostomy, risk of bleeding from biopsies or removal of colon tissue, possibility of missing a colon polyp or cancer, or adverse drug reaction.  Benefits to include the diagnosis and management of disease of the colon and rectum. Alternatives to include barium enema, radiographic evaluation, lab testing or no intervention. Pt verbalizes understanding and agrees.                    LOVE Crowley    Received records:  CT abdomen/pelvis without renal stone prot 7-20-22 , noted large amount retained fecal material sigmoid colon, cannot exclude circumferential rectal  wall thickening.     He was seen here February 2022 for heme positive stool and iron deficiency anemia.  He canceled EGD and colonoscopy at that time.  We will add EGD to be done at same time as colonoscopy.    Received labs done 8-1-22 at [facility],   hgb 11.5, mcv normal,  Wbc 11.2. iron 27, fe sat 9,     Labs 7-20-22 hgb 10.9, mcv normal,

## 2022-08-03 ENCOUNTER — PREP FOR SURGERY (OUTPATIENT)
Dept: OTHER | Facility: HOSPITAL | Age: 72
End: 2022-08-03

## 2022-08-03 DIAGNOSIS — Z01.818 PREOPERATIVE TESTING: Primary | ICD-10-CM

## 2022-08-03 RX ORDER — POLYETHYLENE GLYCOL 3350, SODIUM SULFATE ANHYDROUS, SODIUM BICARBONATE, SODIUM CHLORIDE, POTASSIUM CHLORIDE 236; 22.74; 6.74; 5.86; 2.97 G/4L; G/4L; G/4L; G/4L; G/4L
4 POWDER, FOR SOLUTION ORAL ONCE
Qty: 4000 ML | Refills: 0 | Status: SHIPPED | OUTPATIENT
Start: 2022-08-03 | End: 2022-08-03

## 2022-08-03 NOTE — TELEPHONE ENCOUNTER
Spoke with PT.  PT is scheduled for 8-18-22.  PT would like to have an endo.    Requested records from [facility].    Prep?

## 2022-08-03 NOTE — TELEPHONE ENCOUNTER
Ok I will add egd ( he had heme pos stool earlier this year and canceled egd).      Golytely prep, I will send rx.

## 2022-08-04 PROBLEM — K59.09 CHRONIC CONSTIPATION: Status: ACTIVE | Noted: 2022-08-04

## 2022-08-04 PROBLEM — R10.32 LLQ PAIN: Status: ACTIVE | Noted: 2022-08-04

## 2022-08-15 ENCOUNTER — LAB (OUTPATIENT)
Dept: LAB | Facility: HOSPITAL | Age: 72
End: 2022-08-15

## 2022-08-15 LAB — SARS-COV-2 ORF1AB RESP QL NAA+PROBE: NOT DETECTED

## 2022-08-15 PROCEDURE — C9803 HOPD COVID-19 SPEC COLLECT: HCPCS | Performed by: NURSE PRACTITIONER

## 2022-08-15 PROCEDURE — U0004 COV-19 TEST NON-CDC HGH THRU: HCPCS | Performed by: NURSE PRACTITIONER

## 2022-08-15 PROCEDURE — U0005 INFEC AGEN DETEC AMPLI PROBE: HCPCS | Performed by: NURSE PRACTITIONER

## 2022-08-18 ENCOUNTER — ANESTHESIA EVENT (OUTPATIENT)
Dept: GASTROENTEROLOGY | Facility: HOSPITAL | Age: 72
End: 2022-08-18

## 2022-08-18 ENCOUNTER — ANESTHESIA (OUTPATIENT)
Dept: GASTROENTEROLOGY | Facility: HOSPITAL | Age: 72
End: 2022-08-18

## 2022-08-18 ENCOUNTER — HOSPITAL ENCOUNTER (OUTPATIENT)
Facility: HOSPITAL | Age: 72
Setting detail: HOSPITAL OUTPATIENT SURGERY
Discharge: HOME | End: 2022-08-18
Attending: INTERNAL MEDICINE | Admitting: INTERNAL MEDICINE

## 2022-08-18 VITALS
DIASTOLIC BLOOD PRESSURE: 72 MMHG | OXYGEN SATURATION: 99 % | BODY MASS INDEX: 19.4 KG/M2 | WEIGHT: 128 LBS | SYSTOLIC BLOOD PRESSURE: 128 MMHG | TEMPERATURE: 97.7 F | HEART RATE: 89 BPM | HEIGHT: 68 IN | RESPIRATION RATE: 21 BRPM

## 2022-08-18 DIAGNOSIS — K62.89 RECTAL MASS: Primary | ICD-10-CM

## 2022-08-18 DIAGNOSIS — D50.9 IRON DEFICIENCY ANEMIA, UNSPECIFIED IRON DEFICIENCY ANEMIA TYPE: ICD-10-CM

## 2022-08-18 DIAGNOSIS — K59.09 CHRONIC CONSTIPATION: ICD-10-CM

## 2022-08-18 DIAGNOSIS — R10.32 LLQ PAIN: ICD-10-CM

## 2022-08-18 PROCEDURE — 45380 COLONOSCOPY AND BIOPSY: CPT | Performed by: INTERNAL MEDICINE

## 2022-08-18 PROCEDURE — 25010000002 PROPOFOL 10 MG/ML EMULSION: Performed by: NURSE ANESTHETIST, CERTIFIED REGISTERED

## 2022-08-18 PROCEDURE — 43239 EGD BIOPSY SINGLE/MULTIPLE: CPT | Performed by: INTERNAL MEDICINE

## 2022-08-18 PROCEDURE — 88305 TISSUE EXAM BY PATHOLOGIST: CPT | Performed by: INTERNAL MEDICINE

## 2022-08-18 PROCEDURE — 87081 CULTURE SCREEN ONLY: CPT | Performed by: INTERNAL MEDICINE

## 2022-08-18 RX ORDER — SODIUM CHLORIDE 0.9 % (FLUSH) 0.9 %
10 SYRINGE (ML) INJECTION AS NEEDED
Status: DISCONTINUED | OUTPATIENT
Start: 2022-08-18 | End: 2022-08-18 | Stop reason: HOSPADM

## 2022-08-18 RX ORDER — LIDOCAINE HYDROCHLORIDE 10 MG/ML
0.5 INJECTION, SOLUTION EPIDURAL; INFILTRATION; INTRACAUDAL; PERINEURAL ONCE AS NEEDED
Status: CANCELLED | OUTPATIENT
Start: 2022-08-18

## 2022-08-18 RX ORDER — LIDOCAINE HYDROCHLORIDE 20 MG/ML
INJECTION, SOLUTION EPIDURAL; INFILTRATION; INTRACAUDAL; PERINEURAL AS NEEDED
Status: DISCONTINUED | OUTPATIENT
Start: 2022-08-18 | End: 2022-08-18 | Stop reason: SURG

## 2022-08-18 RX ORDER — PROPOFOL 10 MG/ML
VIAL (ML) INTRAVENOUS AS NEEDED
Status: DISCONTINUED | OUTPATIENT
Start: 2022-08-18 | End: 2022-08-18 | Stop reason: SURG

## 2022-08-18 RX ORDER — ONDANSETRON 2 MG/ML
4 INJECTION INTRAMUSCULAR; INTRAVENOUS ONCE AS NEEDED
Status: DISCONTINUED | OUTPATIENT
Start: 2022-08-18 | End: 2022-08-18 | Stop reason: HOSPADM

## 2022-08-18 RX ORDER — SODIUM CHLORIDE 9 MG/ML
500 INJECTION, SOLUTION INTRAVENOUS CONTINUOUS PRN
Status: DISCONTINUED | OUTPATIENT
Start: 2022-08-18 | End: 2022-08-18 | Stop reason: HOSPADM

## 2022-08-18 RX ADMIN — LIDOCAINE HYDROCHLORIDE 50 MG: 20 INJECTION, SOLUTION EPIDURAL; INFILTRATION; INTRACAUDAL; PERINEURAL at 08:42

## 2022-08-18 RX ADMIN — PROPOFOL 200 MG: 10 INJECTION, EMULSION INTRAVENOUS at 08:41

## 2022-08-18 RX ADMIN — SODIUM CHLORIDE 500 ML: 9 INJECTION, SOLUTION INTRAVENOUS at 07:25

## 2022-08-18 NOTE — ANESTHESIA PREPROCEDURE EVALUATION
Anesthesia Evaluation     Patient summary reviewed   no history of anesthetic complications:  NPO Solid Status: > 8 hours  NPO Liquid Status: > 8 hours           Airway   TM distance: >3 FB  Neck ROM: full  No difficulty expected  Dental - normal exam     Pulmonary - negative pulmonary ROS and normal exam   Cardiovascular - normal exam  Exercise tolerance: good (4-7 METS)    (+) hypertension well controlled 2 medications or greater,       Neuro/Psych  GI/Hepatic/Renal/Endo - negative ROS     Musculoskeletal (-) negative ROS    Abdominal  - normal exam   Substance History - negative use     OB/GYN          Other - negative ROS                       Anesthesia Plan    ASA 2     MAC     intravenous induction           CODE STATUS:

## 2022-08-18 NOTE — ANESTHESIA POSTPROCEDURE EVALUATION
Patient: Paul ZAMORANO    Procedure Summary     Date: 08/18/22 Room / Location: [facility] ENDOSCOPY 2 / [facility] END[facility]    Anesthesia Start: 0839 Anesthesia Stop: 0901    Procedures:       COLONOSCOPY WITH ANESTHESIA (N/A )      ESOPHAGOGASTRODUODENOSCOPY WITH ANESTHESIA (N/A ) Diagnosis:       Chronic constipation      LLQ pain      Iron deficiency anemia, unspecified iron deficiency anemia type      (Chronic constipation [K59.09])      (LLQ pain [R10.32])      (Iron deficiency anemia, unspecified iron deficiency anemia type [D50.9])    Surgeons: Emil Moody MD Provider: Compa Restrepo CRNA    Anesthesia Type: MAC ASA Status: 2          Anesthesia Type: MAC    Vitals  No vitals data found for the desired time range.          Post Anesthesia Care and Evaluation    Patient location during evaluation: PACU  Patient participation: complete - patient participated  Level of consciousness: awake and awake and alert  Pain score: 0  Pain management: adequate    Airway patency: patent  Anesthetic complications: No anesthetic complications    Cardiovascular status: acceptable and stable  Respiratory status: acceptable and unassisted  Hydration status: acceptable

## 2022-08-19 ENCOUNTER — TELEPHONE (OUTPATIENT)
Dept: GASTROENTEROLOGY | Facility: CLINIC | Age: 72
End: 2022-08-19

## 2022-08-19 LAB
CYTO UR: NORMAL
LAB AP CASE REPORT: NORMAL
Lab: NORMAL
PATH REPORT.FINAL DX SPEC: NORMAL
PATH REPORT.GROSS SPEC: NORMAL
UREASE TISS QL: NEGATIVE

## 2022-08-19 NOTE — TELEPHONE ENCOUNTER
I called him and talk to him about his pathology.  I informed me that fortunately pathology did not show any cancer cells but type of tissue that is precancerous.  I did tell him though that there is still a very likely is cancer cells present and the pathologist got the outer noncancerous portion.  Meaning that there could be cancer cells on further to the part of colon where I cannot get to due to the obstruction.    Regardless, with the obstruction I do recommend surgical resection for removal of the lesion.  He expressed understanding.  He does have an appointment he stated Tuesday with Dr. Fishman.  He plans to keep that.  I did answer couple questions about whether we need a colostomy bag and not I informed and he will need to talk to the surgeon about that precisely.  A lot will depend on how well he can get cleaned out and his anatomy.  Also informed him that if nothing was done then I would worry about this progressing and leading to complete obstruction and an early demise.  He expressed understanding.  He agrees to keep his appointment with the surgeon.

## 2022-08-23 ENCOUNTER — OFFICE VISIT (OUTPATIENT)
Dept: SURGERY | Facility: CLINIC | Age: 72
End: 2022-08-23

## 2022-08-23 VITALS
WEIGHT: 142 LBS | HEIGHT: 69 IN | SYSTOLIC BLOOD PRESSURE: 132 MMHG | DIASTOLIC BLOOD PRESSURE: 76 MMHG | HEART RATE: 76 BPM | BODY MASS INDEX: 21.03 KG/M2

## 2022-08-23 DIAGNOSIS — D12.5 BENIGN NEOPLASM OF SIGMOID COLON: Primary | ICD-10-CM

## 2022-08-23 PROCEDURE — 99204 OFFICE O/P NEW MOD 45 MIN: CPT | Performed by: SPECIALIST

## 2022-08-23 RX ORDER — POLYETHYLENE GLYCOL-3350 AND ELECTROLYTES 236; 6.74; 5.86; 2.97; 22.74 G/274.31G; G/274.31G; G/274.31G; G/274.31G; G/274.31G
POWDER, FOR SOLUTION ORAL
COMMUNITY
Start: 2022-08-15 | End: 2022-08-31

## 2022-08-23 RX ORDER — CELECOXIB 100 MG/1
200 CAPSULE ORAL DAILY
Status: CANCELLED | OUTPATIENT
Start: 2022-08-23

## 2022-08-23 RX ORDER — GABAPENTIN 100 MG/1
600 CAPSULE ORAL 3 TIMES DAILY
Status: CANCELLED | OUTPATIENT
Start: 2022-08-23

## 2022-08-23 RX ORDER — SCOLOPAMINE TRANSDERMAL SYSTEM 1 MG/1
1 PATCH, EXTENDED RELEASE TRANSDERMAL CONTINUOUS
Status: CANCELLED | OUTPATIENT
Start: 2022-08-23 | End: 2022-08-26

## 2022-08-23 RX ORDER — CHLORHEXIDINE GLUCONATE 4 G/100ML
SOLUTION TOPICAL 2 TIMES DAILY
Qty: 236 ML | Refills: 0 | Status: SHIPPED | OUTPATIENT
Start: 2022-08-23 | End: 2022-10-31

## 2022-08-23 RX ORDER — ERYTHROMYCIN 500 MG/1
1000 TABLET, COATED ORAL 3 TIMES DAILY
Qty: 6 TABLET | Refills: 0 | Status: SHIPPED | OUTPATIENT
Start: 2022-08-23 | End: 2022-08-24

## 2022-08-23 RX ORDER — ACETAMINOPHEN 500 MG
1000 TABLET ORAL ONCE
Status: CANCELLED | OUTPATIENT
Start: 2022-08-23 | End: 2022-08-23

## 2022-08-23 RX ORDER — NEOMYCIN SULFATE 500 MG/1
1000 TABLET ORAL 3 TIMES DAILY
Qty: 6 TABLET | Refills: 0 | Status: SHIPPED | OUTPATIENT
Start: 2022-08-23 | End: 2022-08-24

## 2022-08-23 NOTE — PATIENT INSTRUCTIONS
Surgery is scheduled for September 02, 2022 at 6:00 a.m.  Prework is scheduled for August 31, 2022 at 8:45 a.m.  Nothing to eat or drink after midnight before surgery.  No Aspirin, Vitamins or Blood Thinners 5 days prior to surgery.  Take antibtoic the day before surgery as prescribed on the bottle.  Clear liquids the day before surgery and bowel prep the day before surgery.  Please report to the hospital main registation for check in on both days.

## 2022-08-23 NOTE — PROGRESS NOTES
Patient: Paul ZAMORANO    YOB: 1950    Date: 08/23/2022    Primary Care Provider: Phong Rainey APRN    Chief Complaint   Patient presents with   • Mass     Patient is here for rectal mass       Subjective .     History of present illness:   Patient has been having some constipation since Thanksgiving.  Noted to have blood in his stool and some weight loss although he cannot quantify how much.  Underwent colonoscopy which revealed a obstructing mass at the distal sigmoid at 14 cm.  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  Patient reports constipation progressively worsening daily recurrence not associated any urinary symptoms.  He does get up 3 times a night to urinate.  He states he been losing weight since around [place] time but does not know how much    The following portions of the patient's history were reviewed and updated as appropriate: allergies, current medications, past family history, past medical history, past social history, past surgical history and problem list.      History:  Past Medical History:   Diagnosis Date   • Dysuria    • Enlarged prostate    • Hypertension    • Urgency of urination           Past Surgical History:   Procedure Laterality Date   • COLONOSCOPY N/A 8/18/2022    Procedure: COLONOSCOPY WITH ANESTHESIA;  Surgeon: Emil Moody MD;  Location: [facility] ENDOSCOPY;  Service: Gastroenterology;  Laterality: N/A;  pre anemia, LLQ pain  post  phong rainey aprsyed   • CYSTOSCOPY     • CYSTOSCOPY TRANSURETHRAL RESECTION OF PROSTATE N/A 3/28/2017    Procedure: CYSTOSCOPY TRANSURETHRAL RESECTION OF PROSTATE;  Surgeon: Salbador Aguilar MD;  Location: [facility] OR;  Service:    • ENDOSCOPY N/A 8/18/2022    Procedure: ESOPHAGOGASTRODUODENOSCOPY WITH ANESTHESIA;  Surgeon: Emil Moody MD;  Location: [facility] ENDOSCOPY;  Service: Gastroenterology;  Laterality: N/A;  pre anemia, LLQ pain  post gastritis  phong redd aprn   •  "HEMORRHOIDECTOMY      AS TEENAGER       Family History   Problem Relation Age of Onset   • No Known Problems Father    • No Known Problems Mother    • Colon cancer Neg Hx    • Colon polyps Neg Hx        Social History     Tobacco Use   • Smoking status: Current Every Day Smoker     Packs/day: 0.50     Years: 20.00     Pack years: 10.00     Types: Cigarettes   • Smokeless tobacco: Never Used   Vaping Use   • Vaping Use: Never used   Substance Use Topics   • Alcohol use: Yes     Alcohol/week: 2.0 standard drinks     Types: 2 Cans of beer per week     Comment: occ   • Drug use: No       Allergies:  No Known Allergies    Medications:     Current Outpatient Medications:   •  amLODIPine (NORVASC) 5 MG tablet, Take 5 mg by mouth Daily., Disp: , Rfl:   •  carbidopa-levodopa (SINEMET)  MG per tablet, Take 1 tablet by mouth 3 (Three) Times a Day for 90 days., Disp: 90 tablet, Rfl: 2  •  chlorhexidine (HIBICLENS) 4 % external liquid, Apply  topically to the appropriate area as directed 2 (Two) Times a Day. Shower With Hibiclens Solution Twice The Day Before Surgery, Disp: 236 mL, Rfl: 0  •  erythromycin base (E-MYCIN) 500 MG tablet, Take 2 tablets by mouth 3 (Three) Times a Day for 1 day. Take 2 tablets at 2 pm, 3 pm, and 10 pm the day before surgery., Disp: 6 tablet, Rfl: 0  •  GaviLyte-G 236 g solution, , Disp: , Rfl:   •  neomycin (MYCIFRADIN) 500 MG tablet, Take 2 tablets by mouth 3 (Three) Times a Day for 1 day. Take 2 tablets at 2 pm, 3 pm, and 10 pm the day before surgery., Disp: 6 tablet, Rfl: 0    Objective     Vital Signs:   Vitals:    08/23/22 0917   BP: 132/76   BP Location: Left arm   Patient Position: Sitting   Cuff Size: Adult   Pulse: 76   Weight: 64.4 kg (142 lb)   Height: 175.3 cm (69\")       Physical Exam:     General Appearance:    Alert, cooperative, in no acute distress   Head:    Normocephalic, without obvious abnormality, atraumatic   Eyes:            Lids and lashes normal, conjunctivae and " sclerae normal, no  icterus, no pallor, corneas clear,   Ears:    Ears appear intact with no abnormalities noted   Breast:     deferred   Lungs:     Clear to auscultation,respirations regular, even and              Unlabored    Heart:    Regular rhythm and normal rate, no murmur, no gallop.   Chest Wall:    No abnormalities observed   Abdomen    Rectal:    Thin I cannot appreciate any masses hernias lymphadenopathy or hepatosplenomegaly.  Good bowel sounds    Deferred   Extremities:   Moves all extremities well, no edema, no cyanosis, no          redness   Pulses:   Pulses palpable and equal bilaterally   Skin:   No bleeding, bruising or rash   Lymph nodes:   No palpable adenopathy   Neurologic:   Cranial nerves 2 - 12 grossly intact.         Results Review:   I reviewed the patient's new clinical results.        Assessment / Plan:    Diagnoses and all orders for this visit:    1. Benign neoplasm of sigmoid colon (Primary)  -     Case Request; Standing  -     COVID PRE-OP / PRE-PROCEDURE SCREENING ORDER (NO ISOLATION) - Swab, Nasopharynx; Future  -     Comprehensive Metabolic Panel; Future  -     Type & Screen; Future  -     Case Request    Other orders  -     Follow Anesthesia Guidelines / Protocol; Future  -     Obtain Informed Consent; Future  -     Contact Wound / Ostomy Nurse for Stoma Marking; Future  -     Provide NPO Instructions to Patient; Future  -     Provide Hydration Instructions to Patient; Future  -     Provide Patient With Enhanced Recovery Booklet(s) or Handout; Future  -     Provide Chlorhexidine Skin Prep Wipes and Instructions; Future  -     chlorhexidine (HIBICLENS) 4 % external liquid; Apply  topically to the appropriate area as directed 2 (Two) Times a Day. Shower With Hibiclens Solution Twice The Day Before Surgery  Dispense: 236 mL; Refill: 0  -     erythromycin base (E-MYCIN) 500 MG tablet; Take 2 tablets by mouth 3 (Three) Times a Day for 1 day. Take 2 tablets at 2 pm, 3 pm, and 10 pm the  day before surgery.  Dispense: 6 tablet; Refill: 0  -     neomycin (MYCIFRADIN) 500 MG tablet; Take 2 tablets by mouth 3 (Three) Times a Day for 1 day. Take 2 tablets at 2 pm, 3 pm, and 10 pm the day before surgery.  Dispense: 6 tablet; Refill: 0    I reviewed the patient's colonoscopy report and discussed with Dr. Neil    I reviewed the patient's pathology report and discussed with Dr. neil .    BMI is within normal parameters. No other follow-up for BMI required.    #1  Benign neoplasm sigmoid colon- I spoke with Dr. Neil regarding the colonoscopy findings.  He is concerned that this represents cancer.  I have discussed the possibility of this being cancerous versus benign with the patient and daughter who was present in the room during the entire interview.  Dr. Neil does not feel that this is in the rectum and would not be amenable to preoperative radiation therapy even if the biopsy showed this to be cancerous.  Therefore we will proceed with laparoscopic exploration with the goal of sigmoid colectomy and primary anastomosis although given the obstructive nature in the stool backup and difficulty with the bowel prep that the patient had, feel it is quite possible that a proximal colostomy temporarily will be necessary.  This was explained to the patient.  He understands the risks of bleeding infection injury to the ureter bladder kidneys intestines nerves with the possibility that this is unresectable at this time.  The risks of blood clots pneumonias poor wound healing cardiac complications pulmonary complications all discussed.  We did discuss smoking cessation as well.      Electronically signed by Scarlet Fishman MD  08/23/22  09:58 CDT

## 2022-08-31 ENCOUNTER — PRE-ADMISSION TESTING (OUTPATIENT)
Dept: PREADMISSION TESTING | Facility: HOSPITAL | Age: 72
End: 2022-08-31

## 2022-08-31 VITALS
DIASTOLIC BLOOD PRESSURE: 76 MMHG | WEIGHT: 123.24 LBS | RESPIRATION RATE: 18 BRPM | HEIGHT: 67 IN | OXYGEN SATURATION: 97 % | HEART RATE: 86 BPM | SYSTOLIC BLOOD PRESSURE: 146 MMHG | BODY MASS INDEX: 19.34 KG/M2

## 2022-08-31 DIAGNOSIS — D12.5 BENIGN NEOPLASM OF SIGMOID COLON: ICD-10-CM

## 2022-08-31 LAB
ABO GROUP BLD: NORMAL
ALBUMIN SERPL-MCNC: 4.7 G/DL (ref 3.5–5.2)
ALBUMIN/GLOB SERPL: 1.3 G/DL
ALP SERPL-CCNC: 77 U/L (ref 39–117)
ALT SERPL W P-5'-P-CCNC: 7 U/L (ref 1–41)
ANION GAP SERPL CALCULATED.3IONS-SCNC: 8 MMOL/L (ref 5–15)
AST SERPL-CCNC: 15 U/L (ref 1–40)
BILIRUB SERPL-MCNC: 0.5 MG/DL (ref 0–1.2)
BLD GP AB SCN SERPL QL: NEGATIVE
BUN SERPL-MCNC: 13 MG/DL (ref 8–23)
BUN/CREAT SERPL: 11.2 (ref 7–25)
CALCIUM SPEC-SCNC: 10.1 MG/DL (ref 8.6–10.5)
CHLORIDE SERPL-SCNC: 103 MMOL/L (ref 98–107)
CO2 SERPL-SCNC: 28 MMOL/L (ref 22–29)
CREAT SERPL-MCNC: 1.16 MG/DL (ref 0.76–1.27)
DEPRECATED RDW RBC AUTO: 44.3 FL (ref 37–54)
EGFRCR SERPLBLD CKD-EPI 2021: 68.2 ML/MIN/1.73
ERYTHROCYTE [DISTWIDTH] IN BLOOD BY AUTOMATED COUNT: 14.3 % (ref 12.3–15.4)
GLOBULIN UR ELPH-MCNC: 3.5 GM/DL
GLUCOSE SERPL-MCNC: 102 MG/DL (ref 65–99)
HCT VFR BLD AUTO: 36.8 % (ref 37.5–51)
HGB BLD-MCNC: 11.3 G/DL (ref 13–17.7)
MCH RBC QN AUTO: 26 PG (ref 26.6–33)
MCHC RBC AUTO-ENTMCNC: 30.7 G/DL (ref 31.5–35.7)
MCV RBC AUTO: 84.6 FL (ref 79–97)
PLATELET # BLD AUTO: 309 10*3/MM3 (ref 140–450)
PMV BLD AUTO: 9.5 FL (ref 6–12)
POTASSIUM SERPL-SCNC: 4.1 MMOL/L (ref 3.5–5.2)
PROT SERPL-MCNC: 8.2 G/DL (ref 6–8.5)
RBC # BLD AUTO: 4.35 10*6/MM3 (ref 4.14–5.8)
RH BLD: POSITIVE
SODIUM SERPL-SCNC: 139 MMOL/L (ref 136–145)
T&S EXPIRATION DATE: NORMAL
WBC NRBC COR # BLD: 4.75 10*3/MM3 (ref 3.4–10.8)

## 2022-08-31 PROCEDURE — 85027 COMPLETE CBC AUTOMATED: CPT

## 2022-08-31 PROCEDURE — 93005 ELECTROCARDIOGRAM TRACING: CPT

## 2022-08-31 PROCEDURE — 86900 BLOOD TYPING SEROLOGIC ABO: CPT

## 2022-08-31 PROCEDURE — 86850 RBC ANTIBODY SCREEN: CPT

## 2022-08-31 PROCEDURE — 93010 ELECTROCARDIOGRAM REPORT: CPT | Performed by: INTERNAL MEDICINE

## 2022-08-31 PROCEDURE — 36415 COLL VENOUS BLD VENIPUNCTURE: CPT

## 2022-08-31 PROCEDURE — 86901 BLOOD TYPING SEROLOGIC RH(D): CPT

## 2022-08-31 PROCEDURE — 80053 COMPREHEN METABOLIC PANEL: CPT

## 2022-09-01 ENCOUNTER — ANESTHESIA EVENT (OUTPATIENT)
Dept: PERIOP | Facility: HOSPITAL | Age: 72
End: 2022-09-01

## 2022-09-02 ENCOUNTER — HOSPITAL ENCOUNTER (INPATIENT)
Facility: HOSPITAL | Age: 72
LOS: 4 days | Discharge: HOME | End: 2022-09-06
Attending: SPECIALIST | Admitting: SPECIALIST

## 2022-09-02 ENCOUNTER — ANESTHESIA (OUTPATIENT)
Dept: PERIOP | Facility: HOSPITAL | Age: 72
End: 2022-09-02

## 2022-09-02 DIAGNOSIS — D12.5 BENIGN NEOPLASM OF SIGMOID COLON: ICD-10-CM

## 2022-09-02 PROCEDURE — C1765 ADHESION BARRIER: HCPCS | Performed by: SPECIALIST

## 2022-09-02 PROCEDURE — 25010000002 MIDAZOLAM PER 1 MG: Performed by: NURSE ANESTHETIST, CERTIFIED REGISTERED

## 2022-09-02 PROCEDURE — P9041 ALBUMIN (HUMAN),5%, 50ML: HCPCS | Performed by: NURSE ANESTHETIST, CERTIFIED REGISTERED

## 2022-09-02 PROCEDURE — 25010000002 CEFOXITIN PER 1 G: Performed by: SPECIALIST

## 2022-09-02 PROCEDURE — 25010000002 ALBUMIN HUMAN 5% PER 50 ML: Performed by: NURSE ANESTHETIST, CERTIFIED REGISTERED

## 2022-09-02 PROCEDURE — 44207 L COLECTOMY/COLOPROCTOSTOMY: CPT | Performed by: SPECIALIST

## 2022-09-02 PROCEDURE — 88309 TISSUE EXAM BY PATHOLOGIST: CPT | Performed by: SPECIALIST

## 2022-09-02 PROCEDURE — 0DBN4ZZ EXCISION OF SIGMOID COLON, PERCUTANEOUS ENDOSCOPIC APPROACH: ICD-10-PCS | Performed by: SPECIALIST

## 2022-09-02 PROCEDURE — 25010000002 HYDROMORPHONE 1 MG/ML SOLUTION: Performed by: NURSE ANESTHETIST, CERTIFIED REGISTERED

## 2022-09-02 PROCEDURE — 3E0M35Z INTRODUCTION OF ADHESION BARRIER INTO PERITONEAL CAVITY, PERCUTANEOUS APPROACH: ICD-10-PCS | Performed by: SPECIALIST

## 2022-09-02 PROCEDURE — 25010000002 PROPOFOL 10 MG/ML EMULSION: Performed by: NURSE ANESTHETIST, CERTIFIED REGISTERED

## 2022-09-02 PROCEDURE — 25010000002 MORPHINE PER 10 MG: Performed by: SPECIALIST

## 2022-09-02 PROCEDURE — 0DBP4ZZ EXCISION OF RECTUM, PERCUTANEOUS ENDOSCOPIC APPROACH: ICD-10-PCS | Performed by: SPECIALIST

## 2022-09-02 PROCEDURE — 25010000002 HYDROMORPHONE PER 4 MG: Performed by: ANESTHESIOLOGY

## 2022-09-02 PROCEDURE — 25010000002 FENTANYL CITRATE (PF) 50 MCG/ML SOLUTION: Performed by: ANESTHESIOLOGY

## 2022-09-02 PROCEDURE — 25010000002 ONDANSETRON PER 1 MG: Performed by: NURSE ANESTHETIST, CERTIFIED REGISTERED

## 2022-09-02 PROCEDURE — 25010000002 POTASSIUM CHLORIDE PER 2 MEQ OF POTASSIUM: Performed by: SPECIALIST

## 2022-09-02 PROCEDURE — 25010000002 ONDANSETRON PER 1 MG: Performed by: SPECIALIST

## 2022-09-02 DEVICE — CIRCULAR STAPLER WITH DST SERIES TECHNOLOGY
Type: IMPLANTABLE DEVICE | Site: ABDOMEN | Status: FUNCTIONAL
Brand: EEA

## 2022-09-02 DEVICE — PURSE STRING DEVICE
Type: IMPLANTABLE DEVICE | Site: ABDOMEN | Status: FUNCTIONAL
Brand: PURSTRING

## 2022-09-02 DEVICE — ECHELON CONTOUR W/ BLUE RELOAD
Type: IMPLANTABLE DEVICE | Site: ABDOMEN | Status: FUNCTIONAL
Brand: ECHELON

## 2022-09-02 RX ORDER — MIDAZOLAM HYDROCHLORIDE 1 MG/ML
INJECTION INTRAMUSCULAR; INTRAVENOUS AS NEEDED
Status: DISCONTINUED | OUTPATIENT
Start: 2022-09-02 | End: 2022-09-02 | Stop reason: SURG

## 2022-09-02 RX ORDER — KETAMINE HCL IN NACL, ISO-OSM 100MG/10ML
SYRINGE (ML) INJECTION AS NEEDED
Status: DISCONTINUED | OUTPATIENT
Start: 2022-09-02 | End: 2022-09-02 | Stop reason: SURG

## 2022-09-02 RX ORDER — ONDANSETRON 2 MG/ML
4 INJECTION INTRAMUSCULAR; INTRAVENOUS EVERY 6 HOURS PRN
Status: DISCONTINUED | OUTPATIENT
Start: 2022-09-02 | End: 2022-09-06 | Stop reason: HOSPADM

## 2022-09-02 RX ORDER — FENTANYL CITRATE 50 UG/ML
25 INJECTION, SOLUTION INTRAMUSCULAR; INTRAVENOUS
Status: DISCONTINUED | OUTPATIENT
Start: 2022-09-02 | End: 2022-09-02 | Stop reason: HOSPADM

## 2022-09-02 RX ORDER — NALOXONE HCL 0.4 MG/ML
0.4 VIAL (ML) INJECTION
Status: DISCONTINUED | OUTPATIENT
Start: 2022-09-02 | End: 2022-09-04 | Stop reason: ALTCHOICE

## 2022-09-02 RX ORDER — IBUPROFEN 600 MG/1
600 TABLET ORAL ONCE AS NEEDED
Status: DISCONTINUED | OUTPATIENT
Start: 2022-09-02 | End: 2022-09-02 | Stop reason: HOSPADM

## 2022-09-02 RX ORDER — PHENYLEPHRINE HCL IN 0.9% NACL 1 MG/10 ML
SYRINGE (ML) INTRAVENOUS AS NEEDED
Status: DISCONTINUED | OUTPATIENT
Start: 2022-09-02 | End: 2022-09-02 | Stop reason: SURG

## 2022-09-02 RX ORDER — DROPERIDOL 2.5 MG/ML
0.62 INJECTION, SOLUTION INTRAMUSCULAR; INTRAVENOUS ONCE AS NEEDED
Status: DISCONTINUED | OUTPATIENT
Start: 2022-09-02 | End: 2022-09-02 | Stop reason: HOSPADM

## 2022-09-02 RX ORDER — LIDOCAINE HYDROCHLORIDE 10 MG/ML
0.5 INJECTION, SOLUTION EPIDURAL; INFILTRATION; INTRACAUDAL; PERINEURAL ONCE AS NEEDED
Status: DISCONTINUED | OUTPATIENT
Start: 2022-09-02 | End: 2022-09-02 | Stop reason: HOSPADM

## 2022-09-02 RX ORDER — SODIUM CHLORIDE, SODIUM LACTATE, POTASSIUM CHLORIDE, CALCIUM CHLORIDE 600; 310; 30; 20 MG/100ML; MG/100ML; MG/100ML; MG/100ML
1000 INJECTION, SOLUTION INTRAVENOUS CONTINUOUS
Status: DISCONTINUED | OUTPATIENT
Start: 2022-09-02 | End: 2022-09-02

## 2022-09-02 RX ORDER — ONDANSETRON 2 MG/ML
INJECTION INTRAMUSCULAR; INTRAVENOUS AS NEEDED
Status: DISCONTINUED | OUTPATIENT
Start: 2022-09-02 | End: 2022-09-02 | Stop reason: SURG

## 2022-09-02 RX ORDER — GABAPENTIN 300 MG/1
600 CAPSULE ORAL ONCE
Status: COMPLETED | OUTPATIENT
Start: 2022-09-02 | End: 2022-09-02

## 2022-09-02 RX ORDER — ACETAMINOPHEN 500 MG
1000 TABLET ORAL EVERY 6 HOURS
Status: DISPENSED | OUTPATIENT
Start: 2022-09-02 | End: 2022-09-04

## 2022-09-02 RX ORDER — HYDROMORPHONE HYDROCHLORIDE 1 MG/ML
0.5 INJECTION, SOLUTION INTRAMUSCULAR; INTRAVENOUS; SUBCUTANEOUS
Status: DISCONTINUED | OUTPATIENT
Start: 2022-09-02 | End: 2022-09-02 | Stop reason: HOSPADM

## 2022-09-02 RX ORDER — SODIUM CHLORIDE 0.9 % (FLUSH) 0.9 %
3 SYRINGE (ML) INJECTION AS NEEDED
Status: DISCONTINUED | OUTPATIENT
Start: 2022-09-02 | End: 2022-09-02 | Stop reason: HOSPADM

## 2022-09-02 RX ORDER — DEXTROSE MONOHYDRATE 25 G/50ML
12.5 INJECTION, SOLUTION INTRAVENOUS AS NEEDED
Status: DISCONTINUED | OUTPATIENT
Start: 2022-09-02 | End: 2022-09-02 | Stop reason: HOSPADM

## 2022-09-02 RX ORDER — DEXTROSE, SODIUM CHLORIDE, AND POTASSIUM CHLORIDE 5; .45; .15 G/100ML; G/100ML; G/100ML
75 INJECTION INTRAVENOUS CONTINUOUS
Status: DISCONTINUED | OUTPATIENT
Start: 2022-09-02 | End: 2022-09-06 | Stop reason: HOSPADM

## 2022-09-02 RX ORDER — PROPOFOL 10 MG/ML
VIAL (ML) INTRAVENOUS AS NEEDED
Status: DISCONTINUED | OUTPATIENT
Start: 2022-09-02 | End: 2022-09-02 | Stop reason: SURG

## 2022-09-02 RX ORDER — SODIUM CHLORIDE 0.9 % (FLUSH) 0.9 %
10 SYRINGE (ML) INJECTION EVERY 12 HOURS SCHEDULED
Status: DISCONTINUED | OUTPATIENT
Start: 2022-09-02 | End: 2022-09-02 | Stop reason: HOSPADM

## 2022-09-02 RX ORDER — ALVIMOPAN 12 MG/1
12 CAPSULE ORAL 2 TIMES DAILY
Status: DISCONTINUED | OUTPATIENT
Start: 2022-09-03 | End: 2022-09-06 | Stop reason: HOSPADM

## 2022-09-02 RX ORDER — SODIUM CHLORIDE 0.9 % (FLUSH) 0.9 %
10 SYRINGE (ML) INJECTION AS NEEDED
Status: DISCONTINUED | OUTPATIENT
Start: 2022-09-02 | End: 2022-09-02 | Stop reason: HOSPADM

## 2022-09-02 RX ORDER — AMLODIPINE BESYLATE 5 MG/1
5 TABLET ORAL DAILY
Status: DISCONTINUED | OUTPATIENT
Start: 2022-09-02 | End: 2022-09-06 | Stop reason: HOSPADM

## 2022-09-02 RX ORDER — ENOXAPARIN SODIUM 100 MG/ML
40 INJECTION SUBCUTANEOUS DAILY
Status: DISCONTINUED | OUTPATIENT
Start: 2022-09-03 | End: 2022-09-06 | Stop reason: HOSPADM

## 2022-09-02 RX ORDER — OXYCODONE HYDROCHLORIDE 5 MG/1
7.5 TABLET ORAL EVERY 4 HOURS PRN
Status: DISCONTINUED | OUTPATIENT
Start: 2022-09-02 | End: 2022-09-03 | Stop reason: SDUPTHER

## 2022-09-02 RX ORDER — MAGNESIUM HYDROXIDE 1200 MG/15ML
LIQUID ORAL AS NEEDED
Status: DISCONTINUED | OUTPATIENT
Start: 2022-09-02 | End: 2022-09-02 | Stop reason: HOSPADM

## 2022-09-02 RX ORDER — SCOLOPAMINE TRANSDERMAL SYSTEM 1 MG/1
1 PATCH, EXTENDED RELEASE TRANSDERMAL CONTINUOUS
Status: DISCONTINUED | OUTPATIENT
Start: 2022-09-02 | End: 2022-09-02

## 2022-09-02 RX ORDER — OXYCODONE AND ACETAMINOPHEN 10; 325 MG/1; MG/1
1 TABLET ORAL ONCE AS NEEDED
Status: DISCONTINUED | OUTPATIENT
Start: 2022-09-02 | End: 2022-09-02 | Stop reason: HOSPADM

## 2022-09-02 RX ORDER — GABAPENTIN 300 MG/1
300 CAPSULE ORAL 2 TIMES DAILY
Status: COMPLETED | OUTPATIENT
Start: 2022-09-02 | End: 2022-09-05

## 2022-09-02 RX ORDER — SODIUM CHLORIDE, SODIUM LACTATE, POTASSIUM CHLORIDE, CALCIUM CHLORIDE 600; 310; 30; 20 MG/100ML; MG/100ML; MG/100ML; MG/100ML
9 INJECTION, SOLUTION INTRAVENOUS CONTINUOUS
Status: DISCONTINUED | OUTPATIENT
Start: 2022-09-02 | End: 2022-09-03 | Stop reason: HOSPADM

## 2022-09-02 RX ORDER — NALOXONE HCL 0.4 MG/ML
0.04 VIAL (ML) INJECTION AS NEEDED
Status: DISCONTINUED | OUTPATIENT
Start: 2022-09-02 | End: 2022-09-02 | Stop reason: HOSPADM

## 2022-09-02 RX ORDER — ACETAMINOPHEN 500 MG
1000 TABLET ORAL ONCE
Status: COMPLETED | OUTPATIENT
Start: 2022-09-02 | End: 2022-09-02

## 2022-09-02 RX ORDER — LIDOCAINE HYDROCHLORIDE 10 MG/ML
0.5 INJECTION, SOLUTION EPIDURAL; INFILTRATION; INTRACAUDAL; PERINEURAL ONCE AS NEEDED
Status: DISCONTINUED | OUTPATIENT
Start: 2022-09-02 | End: 2022-09-02 | Stop reason: SDUPTHER

## 2022-09-02 RX ORDER — ALBUMIN, HUMAN INJ 5% 5 %
SOLUTION INTRAVENOUS CONTINUOUS PRN
Status: DISCONTINUED | OUTPATIENT
Start: 2022-09-02 | End: 2022-09-02 | Stop reason: SURG

## 2022-09-02 RX ORDER — SODIUM CHLORIDE 9 MG/ML
INJECTION, SOLUTION INTRAVENOUS AS NEEDED
Status: DISCONTINUED | OUTPATIENT
Start: 2022-09-02 | End: 2022-09-02 | Stop reason: HOSPADM

## 2022-09-02 RX ORDER — CELECOXIB 200 MG/1
200 CAPSULE ORAL DAILY
Status: DISCONTINUED | OUTPATIENT
Start: 2022-09-02 | End: 2022-09-06 | Stop reason: HOSPADM

## 2022-09-02 RX ORDER — CELECOXIB 200 MG/1
200 CAPSULE ORAL DAILY
Status: DISCONTINUED | OUTPATIENT
Start: 2022-09-02 | End: 2022-09-02 | Stop reason: HOSPADM

## 2022-09-02 RX ORDER — LABETALOL HYDROCHLORIDE 5 MG/ML
5 INJECTION, SOLUTION INTRAVENOUS
Status: DISCONTINUED | OUTPATIENT
Start: 2022-09-02 | End: 2022-09-02 | Stop reason: HOSPADM

## 2022-09-02 RX ORDER — ROCURONIUM BROMIDE 10 MG/ML
INJECTION, SOLUTION INTRAVENOUS AS NEEDED
Status: DISCONTINUED | OUTPATIENT
Start: 2022-09-02 | End: 2022-09-02 | Stop reason: SURG

## 2022-09-02 RX ORDER — MORPHINE SULFATE 2 MG/ML
2 INJECTION, SOLUTION INTRAMUSCULAR; INTRAVENOUS
Status: DISCONTINUED | OUTPATIENT
Start: 2022-09-02 | End: 2022-09-04 | Stop reason: ALTCHOICE

## 2022-09-02 RX ORDER — FAMOTIDINE 20 MG/1
20 TABLET, FILM COATED ORAL 2 TIMES DAILY
Status: DISCONTINUED | OUTPATIENT
Start: 2022-09-02 | End: 2022-09-06 | Stop reason: HOSPADM

## 2022-09-02 RX ORDER — BUPIVACAINE HYDROCHLORIDE 7.5 MG/ML
INJECTION, SOLUTION EPIDURAL; RETROBULBAR
Status: COMPLETED | OUTPATIENT
Start: 2022-09-02 | End: 2022-09-02

## 2022-09-02 RX ORDER — FLUMAZENIL 0.1 MG/ML
0.2 INJECTION INTRAVENOUS AS NEEDED
Status: DISCONTINUED | OUTPATIENT
Start: 2022-09-02 | End: 2022-09-02 | Stop reason: HOSPADM

## 2022-09-02 RX ORDER — ONDANSETRON 2 MG/ML
4 INJECTION INTRAMUSCULAR; INTRAVENOUS
Status: DISCONTINUED | OUTPATIENT
Start: 2022-09-02 | End: 2022-09-02 | Stop reason: HOSPADM

## 2022-09-02 RX ADMIN — ALBUMIN HUMAN: 0.05 INJECTION, SOLUTION INTRAVENOUS at 09:09

## 2022-09-02 RX ADMIN — CARBIDOPA AND LEVODOPA 1 TABLET: 25; 100 TABLET ORAL at 14:18

## 2022-09-02 RX ADMIN — FENTANYL CITRATE 100 MCG: 50 INJECTION INTRAMUSCULAR; INTRAVENOUS at 09:07

## 2022-09-02 RX ADMIN — Medication 20 MG: at 09:25

## 2022-09-02 RX ADMIN — SODIUM CHLORIDE 2 G: 9 INJECTION, SOLUTION INTRAVENOUS at 08:53

## 2022-09-02 RX ADMIN — LIDOCAINE HYDROCHLORIDE 100 MG: 20 INJECTION INTRAVENOUS at 09:07

## 2022-09-02 RX ADMIN — POTASSIUM CHLORIDE 100 ML/HR: 149 INJECTION, SOLUTION, CONCENTRATE INTRAVENOUS at 15:07

## 2022-09-02 RX ADMIN — MIDAZOLAM 2 MG: 1 INJECTION INTRAMUSCULAR; INTRAVENOUS at 09:00

## 2022-09-02 RX ADMIN — Medication 200 MCG: at 09:23

## 2022-09-02 RX ADMIN — CARBIDOPA AND LEVODOPA 1 TABLET: 25; 100 TABLET ORAL at 21:56

## 2022-09-02 RX ADMIN — ONDANSETRON 4 MG: 2 INJECTION INTRAMUSCULAR; INTRAVENOUS at 10:54

## 2022-09-02 RX ADMIN — AMLODIPINE BESYLATE 5 MG: 5 TABLET ORAL at 14:18

## 2022-09-02 RX ADMIN — FAMOTIDINE 20 MG: 20 TABLET, FILM COATED ORAL at 21:56

## 2022-09-02 RX ADMIN — HYDROMORPHONE HYDROCHLORIDE 900 MCG: 1 INJECTION, SOLUTION INTRAMUSCULAR; INTRAVENOUS; SUBCUTANEOUS at 10:56

## 2022-09-02 RX ADMIN — CELECOXIB 200 MG: 200 CAPSULE ORAL at 06:54

## 2022-09-02 RX ADMIN — ROCURONIUM BROMIDE 30 MG: 10 INJECTION INTRAVENOUS at 09:42

## 2022-09-02 RX ADMIN — ACETAMINOPHEN 1000 MG: 500 TABLET ORAL at 06:51

## 2022-09-02 RX ADMIN — SODIUM CHLORIDE, POTASSIUM CHLORIDE, SODIUM LACTATE AND CALCIUM CHLORIDE 1000 ML: 600; 310; 30; 20 INJECTION, SOLUTION INTRAVENOUS at 07:47

## 2022-09-02 RX ADMIN — ACETAMINOPHEN 1000 MG: 500 TABLET ORAL at 21:56

## 2022-09-02 RX ADMIN — Medication 10 MG: at 10:07

## 2022-09-02 RX ADMIN — HYDROMORPHONE HYDROCHLORIDE 0.5 MG: 1 INJECTION, SOLUTION INTRAMUSCULAR; INTRAVENOUS; SUBCUTANEOUS at 12:13

## 2022-09-02 RX ADMIN — Medication 200 MCG: at 10:07

## 2022-09-02 RX ADMIN — HYDROMORPHONE HYDROCHLORIDE 100 MCG: 1 INJECTION, SOLUTION INTRAMUSCULAR; INTRAVENOUS; SUBCUTANEOUS at 09:04

## 2022-09-02 RX ADMIN — SUGAMMADEX 300 MG: 100 INJECTION, SOLUTION INTRAVENOUS at 11:11

## 2022-09-02 RX ADMIN — ROCURONIUM BROMIDE 50 MG: 10 INJECTION INTRAVENOUS at 09:07

## 2022-09-02 RX ADMIN — GABAPENTIN 600 MG: 300 CAPSULE ORAL at 06:51

## 2022-09-02 RX ADMIN — CELECOXIB 200 MG: 200 CAPSULE ORAL at 14:19

## 2022-09-02 RX ADMIN — HYDROMORPHONE HYDROCHLORIDE 0.5 MG: 1 INJECTION, SOLUTION INTRAMUSCULAR; INTRAVENOUS; SUBCUTANEOUS at 12:24

## 2022-09-02 RX ADMIN — ONDANSETRON 4 MG: 2 INJECTION INTRAMUSCULAR; INTRAVENOUS at 17:33

## 2022-09-02 RX ADMIN — GABAPENTIN 300 MG: 300 CAPSULE ORAL at 17:17

## 2022-09-02 RX ADMIN — SODIUM CHLORIDE 1 G: 9 INJECTION, SOLUTION INTRAVENOUS at 10:53

## 2022-09-02 RX ADMIN — PROPOFOL 150 MG: 10 INJECTION, EMULSION INTRAVENOUS at 09:07

## 2022-09-02 RX ADMIN — ROCURONIUM BROMIDE 20 MG: 10 INJECTION INTRAVENOUS at 10:41

## 2022-09-02 RX ADMIN — Medication 200 MCG: at 09:26

## 2022-09-02 RX ADMIN — BUPIVACAINE HYDROCHLORIDE 1.2 ML: 7.5 INJECTION, SOLUTION EPIDURAL; RETROBULBAR at 09:04

## 2022-09-02 RX ADMIN — MORPHINE SULFATE 2 MG: 2 INJECTION, SOLUTION INTRAMUSCULAR; INTRAVENOUS at 14:15

## 2022-09-02 RX ADMIN — Medication 20 MG: at 10:56

## 2022-09-02 NOTE — ANESTHESIA PREPROCEDURE EVALUATION
Anesthesia Evaluation     Patient summary reviewed   no history of anesthetic complications:  NPO Solid Status: > 8 hours             Airway   Mallampati: II  Dental    (+) poor dentition    Pulmonary    (+) a smoker,   (-) COPD, asthma, sleep apnea  Cardiovascular   Exercise tolerance: good (4-7 METS)    (+) hypertension,   (-) pacemaker, past MI, angina, cardiac stents      Neuro/Psych  (+) neuromuscular disease (parkinsons),    (-) seizures, TIA, CVA  GI/Hepatic/Renal/Endo    (-) GERD, liver disease, no renal disease, diabetes    Musculoskeletal     Abdominal    Substance History      OB/GYN          Other      history of cancer                    Anesthesia Plan    ASA 3     general and ITN     intravenous induction     Anesthetic plan, risks, benefits, and alternatives have been provided, discussed and informed consent has been obtained with: patient.        CODE STATUS:

## 2022-09-02 NOTE — ANESTHESIA POSTPROCEDURE EVALUATION
Patient: Paul ZAMORANO    Procedure Summary     Date: 09/02/22 Room / Location:  [facility] OR 06 /  [facility] OR    Anesthesia Start: 0843 Anesthesia Stop: 1133    Procedure: COLON RESECTION LAPAROSCOPIC SIGMOID OR LOW ANTERIOR (N/A Abdomen) Diagnosis:       Benign neoplasm of sigmoid colon      (Benign neoplasm of sigmoid colon [D12.5])    Surgeons: Scarlet Fishman MD Provider: Tito Aldana CRNA    Anesthesia Type: general, ITN ASA Status: 3          Anesthesia Type: general, ITN    Vitals  Vitals Value Taken Time   /63 09/02/22 1248   Temp 97.5 °F (36.4 °C) 09/02/22 1131   Pulse 84 09/02/22 1256   Resp 12 09/02/22 1245   SpO2 95 % 09/02/22 1256   Vitals shown include unvalidated device data.        Post Anesthesia Care and Evaluation    Patient location during evaluation: PACU  Patient participation: complete - patient participated  Level of consciousness: awake and alert  Pain management: adequate    Airway patency: patent  Anesthetic complications: No anesthetic complications    Cardiovascular status: acceptable  Respiratory status: acceptable  Hydration status: acceptable    Comments: Blood pressure 139/63, pulse 72, temperature 97.5 °F (36.4 °C), temperature source Temporal, resp. rate 12, weight 55.2 kg (121 lb 11.1 oz), SpO2 94 %.    Pt discharged from PACU based on elise score >8

## 2022-09-02 NOTE — PLAN OF CARE
Goal Outcome Evaluation:  Plan of Care Reviewed With: patient, family        Progress: no change  Outcome Evaluation: Initial RDN eval. Malnutrition assessment completed. He states he's been eating poorly at home and has had wt loss. He has been drinking strawberry boost. Is s/p colon resection today and is on clear liquid diet. Once diet able to be advanced may consider adding nutrition supplement to diet orders. Encouraged Clr liquid intake as tolerated and will continue to follow.

## 2022-09-02 NOTE — OP NOTE
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    A Large Intestine, Sigmoid Colon Tissue · TISSUE PATHOLOGY EXAM   Scarlet Fishman MD 9/2/22 1107     Description: Rectosigmoid colon            Drains:   Urethral Catheter Double-lumen;Silicone 16 Fr. (Active)       Findings: Strictured firm area in the rectosigmoid junction    Complications: none    Description: The patient was brought to the operating room and placed in the supine position.  After induction of general anesthesia and infusion of IV antibiotics the patient was positioned in the lithotomy position, Barbosa catheter placed, and the patient was prepped and draped in the usual sterile fashion.  Veress needle technique was used to an infraumbilical midline incision and the abdomen was insufflated to a pressure of 15 mmHg.  Three 5mm ports were placed under direct vision.  The abdomen was inspected.  The patient was positioned in Trendelenburg position and rolled slightly to the right area the descending colon and sigmoid colon were mobilized off of the lateral wall.  This mobilization was freed down into the pelvis.  The rectum was mobilized at the peritoneal reflection.  Both the left and right ureters were identified and protected.  With adequate mobility of the descending colon all the way down to the peritoneal reflection we converted to the open part of the procedure.  Midline  incision was made.  Wound protector was placed.  We were well below the strictured area and freed it from the mesentery.  We then divided the mesentery with the my scalpel up to the proximal sigmoid.  The sigmoid colon was sized this was freed down to the mesenteric vessels and divided between clamps.  The contour stapler was used in the pelvis and then the pursestring suture stapler was used proximally.  The specimen was passed off.  With the 25 cm and 27 cm sizer so the 28 EEA was used.  The anvil was placed in the proximal sigmoid and tied.  My partner Dr. Kidd scrubbed in for the critical part of the operation.  She passed the stapler through the rectum and just anterior to the staple line the spike was passed.  The anvil was docked and then tightened.  Was fired and 2 good donuts were noted.  Circumferentially seromuscular silk sutures were placed to reinforce the anastomosis.  The anastomosis was tested inflating air through the sigmoidoscope with the bowel clamp placed proximally in the pelvis filled with fluid.  No air bubbles were noted with good sigmoid distention.  Hemostasis was adequate the wound was irrigated as was the pelvis and the abdominal cavity.  We placed Seprafilm.  We then converted to the clean part of the operation. the abdomen was copiously irrigated.   Seprafilm was placed.  The closure was begun by running a 2-0 Vicryl on the peritoneum inferiorly.  Then the fascia was closed with interrupted figure-of-eight #1 Prolenes.  The wound irrigated.  2-0 and 4-0 Vicryl sutures were used on the skin.  Dressing was placed.  Patient was awakened and transferred to the recovery room in stable condition having tolerated the procedure well.  At the end of the procedure all counts were correct      Scarlet Fishman MD     Date: 9/2/2022  Time: 11:22 CDT

## 2022-09-02 NOTE — PROGRESS NOTES
Malnutrition Severity Assessment    Patient Name:  Paul ZAMORANO  YOB: 1950  MRN: [id number]  Admit Date:  9/2/2022    Patient meets criteria for : Severe Malnutrition      Malnutrition Severity Assessment  Malnutrition Type: Chronic Disease - Related Malnutrition  Malnutrition Type (last 8 hours)     Malnutrition Severity Assessment     Row Name 09/02/22 1618       Malnutrition Severity Assessment    Malnutrition Type Chronic Disease - Related Malnutrition    Row Name 09/02/22 1618       Insufficient Energy Intake     Insufficient Energy Intake Findings Severe    Insufficient Energy Intake  <50% of est. energy requirement for >or equal to 1 month    Row Name 09/02/22 1618       Unintentional Weight Loss     Unintentional Weight Loss Findings Severe    Unintentional Weight Loss  Weight loss greater than 10% in six months  ~18 lbs (13%) in 6 months    Row Name 09/02/22 1618       Muscle Loss    Loss of Muscle Mass Findings Severe    [place] Severe - deep hollowing/scooping, lack of muscle to touch, facial bones well defined    Clavicle Bone Region Severe - protruding prominent bone    Acromion Bone Region Severe - squared shoulders, bones, and acromion process protrusion prominent    Row Name 09/02/22 1618       Fat Loss    Subcutaneous Fat Loss Findings Severe    Orbital Region  Severe - pronounced hollowness/depression, dark circles, loose saggy skin    Upper Arm Region Severe - mostly skin, very little space between folds, fingers touch    Row Name 09/02/22 1618       Criteria Met (Must meet criteria for severity in at least 2 of these categories: M Wasting, Fat Loss, Fluid, Secondary Signs, Wt. Status, Intake)    Patient meets criteria for  Severe Malnutrition                Electronically signed by:  Nalini De Leon RDN, LD  09/02/22 16:18 CDT

## 2022-09-02 NOTE — ANESTHESIA PROCEDURE NOTES
Spinal Block    Pre-sedation assessment completed: 9/2/2022 9:00 AM    Patient location during procedure: OR  Start Time: 9/2/2022 8:40 AM  Stop Time: 9/2/2022 9:04 AM  Performed By  Anesthesiologist: Raffi Juárez MD CRNA/CAA: Tito Aldana CRNA  Preanesthetic Checklist  Completed: patient identified, IV checked, site marked, risks and benefits discussed, surgical consent, monitors and equipment checked, pre-op evaluation and timeout performed  Spinal Block Prep:  Patient Position:sitting  Sterile Tech:gloves, mask and sterile barriers  Prep:Betadine  Patient Monitoring:blood pressure monitoring and continuous pulse oximetry  Spinal Block Procedure  Approach:midline  Location:L3-L4  Needle Type:Pencan  Needle Gauge:22 G  Paresthesia: no  Fluid Appearance:clear  Medications: bupivacaine PF (MARCAINE) 0.75%, 1.2 mL  Med Administered at 9/2/2022 9:04 AM   Post Assessment  Patient Tolerance:patient tolerated the procedure well with no apparent complications  Complications no

## 2022-09-02 NOTE — ANESTHESIA PROCEDURE NOTES
Airway  Date/Time: 9/2/2022 9:09 AM  Airway not difficult    General Information and Staff    Patient location during procedure: OR  CRNA/CAA: Tito Aldana CRNA    Indications and Patient Condition  Indications for airway management: airway protection    Preoxygenated: yes  Mask difficulty assessment: 1 - vent by mask    Final Airway Details  Final airway type: endotracheal airway      Successful airway: ETT  Cuffed: yes   Successful intubation technique: direct laryngoscopy  Endotracheal tube insertion site: oral  Blade: Jeronimo  Blade size: 4  ETT size (mm): 7.5  Cormack-Lehane Classification: grade I - full view of glottis  Placement verified by: chest auscultation and capnometry   Cuff volume (mL): 8  Measured from: lips  ETT/EBT  to lips (cm): 22  Number of attempts at approach: 1  Assessment: lips, teeth, and gum same as pre-op and atraumatic intubation

## 2022-09-03 LAB
ANION GAP SERPL CALCULATED.3IONS-SCNC: 8 MMOL/L (ref 5–15)
BASOPHILS # BLD AUTO: 0.01 10*3/MM3 (ref 0–0.2)
BASOPHILS NFR BLD AUTO: 0.1 % (ref 0–1.5)
BUN SERPL-MCNC: 7 MG/DL (ref 8–23)
BUN/CREAT SERPL: 6.5 (ref 7–25)
CALCIUM SPEC-SCNC: 9.3 MG/DL (ref 8.6–10.5)
CHLORIDE SERPL-SCNC: 105 MMOL/L (ref 98–107)
CO2 SERPL-SCNC: 25 MMOL/L (ref 22–29)
CREAT SERPL-MCNC: 1.07 MG/DL (ref 0.76–1.27)
DEPRECATED RDW RBC AUTO: 44.1 FL (ref 37–54)
EGFRCR SERPLBLD CKD-EPI 2021: 75.1 ML/MIN/1.73
EOSINOPHIL # BLD AUTO: 0.11 10*3/MM3 (ref 0–0.4)
EOSINOPHIL NFR BLD AUTO: 1.6 % (ref 0.3–6.2)
ERYTHROCYTE [DISTWIDTH] IN BLOOD BY AUTOMATED COUNT: 14.3 % (ref 12.3–15.4)
GLUCOSE SERPL-MCNC: 115 MG/DL (ref 65–99)
HCT VFR BLD AUTO: 36.9 % (ref 37.5–51)
HGB BLD-MCNC: 11.6 G/DL (ref 13–17.7)
IMM GRANULOCYTES # BLD AUTO: 0.03 10*3/MM3 (ref 0–0.05)
IMM GRANULOCYTES NFR BLD AUTO: 0.4 % (ref 0–0.5)
LYMPHOCYTES # BLD AUTO: 1.99 10*3/MM3 (ref 0.7–3.1)
LYMPHOCYTES NFR BLD AUTO: 28.1 % (ref 19.6–45.3)
MCH RBC QN AUTO: 26.5 PG (ref 26.6–33)
MCHC RBC AUTO-ENTMCNC: 31.4 G/DL (ref 31.5–35.7)
MCV RBC AUTO: 84.4 FL (ref 79–97)
MONOCYTES # BLD AUTO: 0.46 10*3/MM3 (ref 0.1–0.9)
MONOCYTES NFR BLD AUTO: 6.5 % (ref 5–12)
NEUTROPHILS NFR BLD AUTO: 4.48 10*3/MM3 (ref 1.7–7)
NEUTROPHILS NFR BLD AUTO: 63.3 % (ref 42.7–76)
NRBC BLD AUTO-RTO: 0 /100 WBC (ref 0–0.2)
PLATELET # BLD AUTO: 332 10*3/MM3 (ref 140–450)
PMV BLD AUTO: 9.9 FL (ref 6–12)
POTASSIUM SERPL-SCNC: 3.6 MMOL/L (ref 3.5–5.2)
QT INTERVAL: 358 MS
QTC INTERVAL: 408 MS
RBC # BLD AUTO: 4.37 10*6/MM3 (ref 4.14–5.8)
SODIUM SERPL-SCNC: 138 MMOL/L (ref 136–145)
WBC NRBC COR # BLD: 7.08 10*3/MM3 (ref 3.4–10.8)

## 2022-09-03 PROCEDURE — 25010000002 PROMETHAZINE PER 50 MG: Performed by: SPECIALIST

## 2022-09-03 PROCEDURE — 25010000002 ONDANSETRON PER 1 MG: Performed by: SPECIALIST

## 2022-09-03 PROCEDURE — 99024 POSTOP FOLLOW-UP VISIT: CPT | Performed by: SPECIALIST

## 2022-09-03 PROCEDURE — 85025 COMPLETE CBC W/AUTO DIFF WBC: CPT | Performed by: SPECIALIST

## 2022-09-03 PROCEDURE — 80048 BASIC METABOLIC PNL TOTAL CA: CPT | Performed by: SPECIALIST

## 2022-09-03 PROCEDURE — 25010000002 ENOXAPARIN PER 10 MG: Performed by: SPECIALIST

## 2022-09-03 PROCEDURE — 25010000002 POTASSIUM CHLORIDE PER 2 MEQ OF POTASSIUM: Performed by: SPECIALIST

## 2022-09-03 RX ORDER — OXYCODONE HYDROCHLORIDE 5 MG/1
5 TABLET ORAL EVERY 4 HOURS PRN
Status: DISPENSED | OUTPATIENT
Start: 2022-09-03 | End: 2022-09-04

## 2022-09-03 RX ORDER — NALBUPHINE HCL 10 MG/ML
2.5 AMPUL (ML) INJECTION EVERY 4 HOURS PRN
Status: ACTIVE | OUTPATIENT
Start: 2022-09-03 | End: 2022-09-04

## 2022-09-03 RX ORDER — OXYCODONE HYDROCHLORIDE 5 MG/1
10 TABLET ORAL EVERY 4 HOURS PRN
Status: ACTIVE | OUTPATIENT
Start: 2022-09-03 | End: 2022-09-04

## 2022-09-03 RX ADMIN — POTASSIUM CHLORIDE 100 ML/HR: 149 INJECTION, SOLUTION, CONCENTRATE INTRAVENOUS at 21:19

## 2022-09-03 RX ADMIN — ENOXAPARIN SODIUM 40 MG: 100 INJECTION SUBCUTANEOUS at 09:05

## 2022-09-03 RX ADMIN — POTASSIUM CHLORIDE 100 ML/HR: 149 INJECTION, SOLUTION, CONCENTRATE INTRAVENOUS at 01:43

## 2022-09-03 RX ADMIN — CARBIDOPA AND LEVODOPA 1 TABLET: 25; 100 TABLET ORAL at 16:29

## 2022-09-03 RX ADMIN — ALVIMOPAN 12 MG: 12 CAPSULE ORAL at 09:05

## 2022-09-03 RX ADMIN — ALVIMOPAN 12 MG: 12 CAPSULE ORAL at 20:04

## 2022-09-03 RX ADMIN — PROMETHAZINE HYDROCHLORIDE 12.5 MG: 25 INJECTION INTRAMUSCULAR; INTRAVENOUS at 21:19

## 2022-09-03 RX ADMIN — GABAPENTIN 300 MG: 300 CAPSULE ORAL at 20:04

## 2022-09-03 RX ADMIN — CARBIDOPA AND LEVODOPA 1 TABLET: 25; 100 TABLET ORAL at 20:04

## 2022-09-03 RX ADMIN — CELECOXIB 200 MG: 200 CAPSULE ORAL at 09:04

## 2022-09-03 RX ADMIN — AMLODIPINE BESYLATE 5 MG: 5 TABLET ORAL at 09:05

## 2022-09-03 RX ADMIN — ONDANSETRON 4 MG: 2 INJECTION INTRAMUSCULAR; INTRAVENOUS at 11:27

## 2022-09-03 RX ADMIN — CARBIDOPA AND LEVODOPA 1 TABLET: 25; 100 TABLET ORAL at 09:04

## 2022-09-03 RX ADMIN — ACETAMINOPHEN 1000 MG: 500 TABLET ORAL at 13:16

## 2022-09-03 RX ADMIN — FAMOTIDINE 20 MG: 20 TABLET, FILM COATED ORAL at 20:04

## 2022-09-03 RX ADMIN — ONDANSETRON 4 MG: 2 INJECTION INTRAMUSCULAR; INTRAVENOUS at 20:02

## 2022-09-03 RX ADMIN — GABAPENTIN 300 MG: 300 CAPSULE ORAL at 09:05

## 2022-09-03 RX ADMIN — ACETAMINOPHEN 1000 MG: 500 TABLET ORAL at 09:05

## 2022-09-03 RX ADMIN — ACETAMINOPHEN 1000 MG: 500 TABLET ORAL at 20:04

## 2022-09-03 RX ADMIN — ACETAMINOPHEN 1000 MG: 500 TABLET ORAL at 01:43

## 2022-09-03 RX ADMIN — FAMOTIDINE 20 MG: 20 TABLET, FILM COATED ORAL at 09:04

## 2022-09-03 RX ADMIN — POTASSIUM CHLORIDE 100 ML/HR: 149 INJECTION, SOLUTION, CONCENTRATE INTRAVENOUS at 12:10

## 2022-09-03 NOTE — PROGRESS NOTES
Taylor Kidd MD FACS  Progress Note     LOS: 1 day   Patient Care Team:  Nicole Doss APRN as PCP - General (Certified Clinical Nurse Specialist)  Salbador Aguilar MD as Consulting Physician (Urology)  Janna Bryson MD as Consulting Physician (Neurology)  Emil Moody MD as Consulting Physician (Gastroenterology)  Scarlet Fishman MD as Surgeon (General Surgery)      Subjective     Chief complaint: POD#1    History of Present Illness:   Pain controlled.  Denies nausea       The following portions of the patient's history were reviewed and updated as appropriate: allergies, current medications, past family history, past medical history, past social history, past surgical history, and problem list.    Objective     Physical Exam:    Vital Signs:  Temp:  [97.5 °F (36.4 °C)-98.5 °F (36.9 °C)] 97.7 °F (36.5 °C)  Heart Rate:  [55-76] 68  Resp:  [12-18] 16  BP: ()/(51-76) 108/55      Constitutional:    Well-developed, well-nourished in no acute distress  Eyes:     Extraocular movements intact; pupils equal, round, and reactive  Ears, Nose, Mouth, Throat:  Hearing intact, nose midline, no mucosal lesions  Cardiovascular:   Regular rate and rhythm   Respiratory:    Clear to auscultation bilaterally  Gastrointestinal:   Soft, appropriately tender, clean  Genitourinary:    Deferred  Musculoskeletal:   Full range of motion, no muscle wasting, no weakness  Skin:     No rashes or excoriations  Neurological:    Moves all extremities, sensation intact  Psychiatric:    Alert and oriented to person, place, and time  Hematologic/Lymphatic/Immune: No lymphadenopathy      Results Review:    Lab Results (last 24 hours)     Procedure Component Value Units Date/Time    Basic Metabolic Panel [[id number]]  (Abnormal) Collected: 09/03/22 0517    Specimen: Blood Updated: 09/03/22 0625     Glucose 115 mg/dL      BUN 7 mg/dL      Creatinine 1.07 mg/dL      Sodium 138 mmol/L      Potassium 3.6 mmol/L      Chloride 105  mmol/L      CO2 25.0 mmol/L      Calcium 9.3 mg/dL      BUN/Creatinine Ratio 6.5     Anion Gap 8.0 mmol/L      eGFR 75.1 mL/min/1.73      Comment: National Kidney Foundation and American Society of Nephrology (ASN) Task Force recommended calculation based on the Chronic Kidney Disease Epidemiology Collaboration (CKD-EPI) equation refit without adjustment for race.       Narrative:      GFR Normal >60  Chronic Kidney Disease <60  Kidney Failure <15      CBC & Differential [[id number]]  (Abnormal) Collected: 09/03/22 0517    Specimen: Blood Updated: 09/03/22 0605    Narrative:      The following orders were created for panel order CBC & Differential.  Procedure                               Abnormality         Status                     ---------                               -----------         ------                     CBC Auto Differential[[id number]]        Abnormal            Final result                 Please view results for these tests on the individual orders.    CBC Auto Differential [[id number]]  (Abnormal) Collected: 09/03/22 0517    Specimen: Blood Updated: 09/03/22 0605     WBC 7.08 10*3/mm3      RBC 4.37 10*6/mm3      Hemoglobin 11.6 g/dL      Hematocrit 36.9 %      MCV 84.4 fL      MCH 26.5 pg      MCHC 31.4 g/dL      RDW 14.3 %      RDW-SD 44.1 fl      MPV 9.9 fL      Platelets 332 10*3/mm3      Neutrophil % 63.3 %      Lymphocyte % 28.1 %      Monocyte % 6.5 %      Eosinophil % 1.6 %      Basophil % 0.1 %      Immature Grans % 0.4 %      Neutrophils, Absolute 4.48 10*3/mm3      Lymphocytes, Absolute 1.99 10*3/mm3      Monocytes, Absolute 0.46 10*3/mm3      Eosinophils, Absolute 0.11 10*3/mm3      Basophils, Absolute 0.01 10*3/mm3      Immature Grans, Absolute 0.03 10*3/mm3      nRBC 0.0 /100 WBC         Imaging Results (Last 24 Hours)     ** No results found for the last 24 hours. **            Assessment & Plan     POD#1 sigmoidectomy    Continue present care      Taylor Kidd MD  09/03/22  11:17  CDT

## 2022-09-03 NOTE — PLAN OF CARE
Problem: Adult Inpatient Plan of Care  Goal: Plan of Care Review  Outcome: Ongoing, Progressing  Flowsheets (Taken 9/3/2022 0321)  Outcome Evaluation: Patient RA. IVF infusing per order. No c/o pain. Midline and lapx1 CDI. Clear liquid diet, tolerating well. Ambulated in AM. VSS , safety maintained.

## 2022-09-04 LAB
ANION GAP SERPL CALCULATED.3IONS-SCNC: 6 MMOL/L (ref 5–15)
BUN SERPL-MCNC: 5 MG/DL (ref 8–23)
BUN/CREAT SERPL: 5 (ref 7–25)
CALCIUM SPEC-SCNC: 8.7 MG/DL (ref 8.6–10.5)
CHLORIDE SERPL-SCNC: 110 MMOL/L (ref 98–107)
CO2 SERPL-SCNC: 26 MMOL/L (ref 22–29)
CREAT SERPL-MCNC: 1 MG/DL (ref 0.76–1.27)
DEPRECATED RDW RBC AUTO: 44.4 FL (ref 37–54)
EGFRCR SERPLBLD CKD-EPI 2021: 81.5 ML/MIN/1.73
ERYTHROCYTE [DISTWIDTH] IN BLOOD BY AUTOMATED COUNT: 14.1 % (ref 12.3–15.4)
GLUCOSE SERPL-MCNC: 110 MG/DL (ref 65–99)
HCT VFR BLD AUTO: 30.4 % (ref 37.5–51)
HGB BLD-MCNC: 9.3 G/DL (ref 13–17.7)
MAGNESIUM SERPL-MCNC: 1.8 MG/DL (ref 1.6–2.4)
MCH RBC QN AUTO: 26.1 PG (ref 26.6–33)
MCHC RBC AUTO-ENTMCNC: 30.6 G/DL (ref 31.5–35.7)
MCV RBC AUTO: 85.2 FL (ref 79–97)
PHOSPHATE SERPL-MCNC: 2.6 MG/DL (ref 2.5–4.5)
PLATELET # BLD AUTO: 257 10*3/MM3 (ref 140–450)
PMV BLD AUTO: 9.7 FL (ref 6–12)
POTASSIUM SERPL-SCNC: 3.7 MMOL/L (ref 3.5–5.2)
RBC # BLD AUTO: 3.57 10*6/MM3 (ref 4.14–5.8)
SODIUM SERPL-SCNC: 142 MMOL/L (ref 136–145)
WBC NRBC COR # BLD: 5.82 10*3/MM3 (ref 3.4–10.8)

## 2022-09-04 PROCEDURE — 83735 ASSAY OF MAGNESIUM: CPT | Performed by: SPECIALIST

## 2022-09-04 PROCEDURE — 25010000002 POTASSIUM CHLORIDE PER 2 MEQ OF POTASSIUM: Performed by: SPECIALIST

## 2022-09-04 PROCEDURE — 99024 POSTOP FOLLOW-UP VISIT: CPT | Performed by: SPECIALIST

## 2022-09-04 PROCEDURE — 25010000002 ENOXAPARIN PER 10 MG: Performed by: SPECIALIST

## 2022-09-04 PROCEDURE — 80048 BASIC METABOLIC PNL TOTAL CA: CPT | Performed by: SPECIALIST

## 2022-09-04 PROCEDURE — 84100 ASSAY OF PHOSPHORUS: CPT | Performed by: SPECIALIST

## 2022-09-04 PROCEDURE — 85027 COMPLETE CBC AUTOMATED: CPT | Performed by: SPECIALIST

## 2022-09-04 RX ADMIN — FAMOTIDINE 20 MG: 20 TABLET, FILM COATED ORAL at 20:43

## 2022-09-04 RX ADMIN — AMLODIPINE BESYLATE 5 MG: 5 TABLET ORAL at 08:29

## 2022-09-04 RX ADMIN — ALVIMOPAN 12 MG: 12 CAPSULE ORAL at 20:43

## 2022-09-04 RX ADMIN — ALVIMOPAN 12 MG: 12 CAPSULE ORAL at 08:29

## 2022-09-04 RX ADMIN — ACETAMINOPHEN 1000 MG: 500 TABLET ORAL at 08:29

## 2022-09-04 RX ADMIN — OXYCODONE HYDROCHLORIDE 5 MG: 5 TABLET ORAL at 13:25

## 2022-09-04 RX ADMIN — CELECOXIB 200 MG: 200 CAPSULE ORAL at 08:29

## 2022-09-04 RX ADMIN — FAMOTIDINE 20 MG: 20 TABLET, FILM COATED ORAL at 08:29

## 2022-09-04 RX ADMIN — POTASSIUM CHLORIDE 100 ML/HR: 149 INJECTION, SOLUTION, CONCENTRATE INTRAVENOUS at 23:25

## 2022-09-04 RX ADMIN — GABAPENTIN 300 MG: 300 CAPSULE ORAL at 08:29

## 2022-09-04 RX ADMIN — ENOXAPARIN SODIUM 40 MG: 100 INJECTION SUBCUTANEOUS at 08:33

## 2022-09-04 RX ADMIN — OXYCODONE HYDROCHLORIDE 5 MG: 5 TABLET ORAL at 18:10

## 2022-09-04 RX ADMIN — GABAPENTIN 300 MG: 300 CAPSULE ORAL at 20:44

## 2022-09-04 RX ADMIN — POTASSIUM CHLORIDE 100 ML/HR: 149 INJECTION, SOLUTION, CONCENTRATE INTRAVENOUS at 09:26

## 2022-09-04 NOTE — NURSING NOTE
Patient called out complaining of chest pain. On assessment, patient pointed to right upper quadrant of abdominal area above incision and rated the pain at a 2 out of 10 on the numeric scale. Attending, Bernabe, notified of pain location and intensity. No new orders by attending and gave patient already ordered PRN pain medicine. Will evaluate patient pain post administration per protocols.

## 2022-09-04 NOTE — PROGRESS NOTES
Taylor Kidd MD FACS  Progress Note     LOS: 2 days   Patient Care Team:  Nicole Doss APRN as PCP - General (Certified Clinical Nurse Specialist)  Salbador Aguilar MD as Consulting Physician (Urology)  Janna Bryson MD as Consulting Physician (Neurology)  Emil Moody MD as Consulting Physician (Gastroenterology)  Scarlet Fishman MD as Surgeon (General Surgery)      Subjective     Chief complaint: POD#2    History of Present Illness:   Nausea improved from yesterday afternoon. Pain controlled       The following portions of the patient's history were reviewed and updated as appropriate: allergies, current medications, past family history, past medical history, past social history, past surgical history, and problem list.    Objective     Physical Exam:    Vital Signs:  Temp:  [97.7 °F (36.5 °C)-98.8 °F (37.1 °C)] 98.5 °F (36.9 °C)  Heart Rate:  [55-72] 55  Resp:  [16] 16  BP: (108-131)/(55-78) 116/62      Constitutional:    Well-developed, well-nourished in no acute distress  Eyes:     Extraocular movements intact; pupils equal, round, and reactive  Ears, Nose, Mouth, Throat:  Hearing intact, nose midline, no mucosal lesions  Cardiovascular:   Regular rate and rhythm   Respiratory:    Clear to auscultation bilaterally  Gastrointestinal:   Soft, appropriately tender, clean  Genitourinary:    Deferred  Musculoskeletal:   Full range of motion, no muscle wasting, no weakness  Skin:     No rashes or excoriations  Neurological:    Moves all extremities, sensation intact  Psychiatric:    Alert and oriented to person, place, and time  Hematologic/Lymphatic/Immune: No lymphadenopathy      Results Review:    Lab Results (last 24 hours)     Procedure Component Value Units Date/Time    Basic Metabolic Panel [[id number]]  (Abnormal) Collected: 09/04/22 0508    Specimen: Blood Updated: 09/04/22 0558     Glucose 110 mg/dL      BUN 5 mg/dL      Creatinine 1.00 mg/dL      Sodium 142 mmol/L      Potassium 3.7  mmol/L      Chloride 110 mmol/L      CO2 26.0 mmol/L      Calcium 8.7 mg/dL      BUN/Creatinine Ratio 5.0     Anion Gap 6.0 mmol/L      eGFR 81.5 mL/min/1.73      Comment: National Kidney Foundation and American Society of Nephrology (ASN) Task Force recommended calculation based on the Chronic Kidney Disease Epidemiology Collaboration (CKD-EPI) equation refit without adjustment for race.       Narrative:      GFR Normal >60  Chronic Kidney Disease <60  Kidney Failure <15      Phosphorus [[id number]]  (Normal) Collected: 09/04/22 0508    Specimen: Blood Updated: 09/04/22 0558     Phosphorus 2.6 mg/dL     Magnesium [[id number]]  (Normal) Collected: 09/04/22 0508    Specimen: Blood Updated: 09/04/22 0558     Magnesium 1.8 mg/dL     CBC (No Diff) [[id number]]  (Abnormal) Collected: 09/04/22 0508    Specimen: Blood Updated: 09/04/22 0541     WBC 5.82 10*3/mm3      RBC 3.57 10*6/mm3      Hemoglobin 9.3 g/dL      Hematocrit 30.4 %      MCV 85.2 fL      MCH 26.1 pg      MCHC 30.6 g/dL      RDW 14.1 %      RDW-SD 44.4 fl      MPV 9.7 fL      Platelets 257 10*3/mm3         Imaging Results (Last 24 Hours)     ** No results found for the last 24 hours. **            Assessment & Plan     POD#2 sigmoidectomy    Clears as tolerated      Taylor Kidd MD  09/04/22  09:49 CDT

## 2022-09-04 NOTE — PLAN OF CARE
Goal Outcome Evaluation:  Plan of Care Reviewed With: patient        Progress: improving  Outcome Evaluation: IVF infusing. Barbosa in place. SCD's in place. ML/lap x 1 with G/T. Encourage IS. C/O nausea at beginning of shift, no relief with zofran. Phenergan ordered by md. Encourage ambulation. Abdominal binder applied per md order. Ambulated in [place] this am.

## 2022-09-05 LAB
ANION GAP SERPL CALCULATED.3IONS-SCNC: 6 MMOL/L (ref 5–15)
BUN SERPL-MCNC: 4 MG/DL (ref 8–23)
BUN/CREAT SERPL: 4 (ref 7–25)
CALCIUM SPEC-SCNC: 9.4 MG/DL (ref 8.6–10.5)
CHLORIDE SERPL-SCNC: 107 MMOL/L (ref 98–107)
CO2 SERPL-SCNC: 28 MMOL/L (ref 22–29)
CREAT SERPL-MCNC: 1.01 MG/DL (ref 0.76–1.27)
DEPRECATED RDW RBC AUTO: 48.7 FL (ref 37–54)
EGFRCR SERPLBLD CKD-EPI 2021: 80.5 ML/MIN/1.73
ERYTHROCYTE [DISTWIDTH] IN BLOOD BY AUTOMATED COUNT: 14.6 % (ref 12.3–15.4)
GLUCOSE SERPL-MCNC: 95 MG/DL (ref 65–99)
HCT VFR BLD AUTO: 36.5 % (ref 37.5–51)
HGB BLD-MCNC: 10.8 G/DL (ref 13–17.7)
MCH RBC QN AUTO: 26.7 PG (ref 26.6–33)
MCHC RBC AUTO-ENTMCNC: 29.6 G/DL (ref 31.5–35.7)
MCV RBC AUTO: 90.3 FL (ref 79–97)
PLATELET # BLD AUTO: 285 10*3/MM3 (ref 140–450)
PMV BLD AUTO: 9.7 FL (ref 6–12)
POTASSIUM SERPL-SCNC: 4 MMOL/L (ref 3.5–5.2)
RBC # BLD AUTO: 4.04 10*6/MM3 (ref 4.14–5.8)
SODIUM SERPL-SCNC: 141 MMOL/L (ref 136–145)
WBC NRBC COR # BLD: 5.97 10*3/MM3 (ref 3.4–10.8)

## 2022-09-05 PROCEDURE — 99024 POSTOP FOLLOW-UP VISIT: CPT | Performed by: SPECIALIST

## 2022-09-05 PROCEDURE — 80048 BASIC METABOLIC PNL TOTAL CA: CPT | Performed by: SPECIALIST

## 2022-09-05 PROCEDURE — 85027 COMPLETE CBC AUTOMATED: CPT | Performed by: SPECIALIST

## 2022-09-05 PROCEDURE — 25010000002 ENOXAPARIN PER 10 MG: Performed by: SPECIALIST

## 2022-09-05 RX ORDER — OXYCODONE HYDROCHLORIDE 5 MG/1
7.5 TABLET ORAL EVERY 4 HOURS PRN
Status: DISCONTINUED | OUTPATIENT
Start: 2022-09-05 | End: 2022-09-06 | Stop reason: HOSPADM

## 2022-09-05 RX ADMIN — OXYCODONE HYDROCHLORIDE 7.5 MG: 5 TABLET ORAL at 01:11

## 2022-09-05 RX ADMIN — ALVIMOPAN 12 MG: 12 CAPSULE ORAL at 08:34

## 2022-09-05 RX ADMIN — FAMOTIDINE 20 MG: 20 TABLET, FILM COATED ORAL at 20:42

## 2022-09-05 RX ADMIN — AMLODIPINE BESYLATE 5 MG: 5 TABLET ORAL at 08:34

## 2022-09-05 RX ADMIN — CELECOXIB 200 MG: 200 CAPSULE ORAL at 08:34

## 2022-09-05 RX ADMIN — ENOXAPARIN SODIUM 40 MG: 100 INJECTION SUBCUTANEOUS at 08:34

## 2022-09-05 RX ADMIN — GABAPENTIN 300 MG: 300 CAPSULE ORAL at 08:34

## 2022-09-05 RX ADMIN — FAMOTIDINE 20 MG: 20 TABLET, FILM COATED ORAL at 08:34

## 2022-09-05 RX ADMIN — OXYCODONE HYDROCHLORIDE 7.5 MG: 5 TABLET ORAL at 08:34

## 2022-09-05 RX ADMIN — ALVIMOPAN 12 MG: 12 CAPSULE ORAL at 20:42

## 2022-09-05 NOTE — PLAN OF CARE
Goal Outcome Evaluation:  Plan of Care Reviewed With: patient        Progress: improving  Outcome Evaluation: A&OX4, VSS. C/o pain x1 this shift and PRN pain med given with relief. Midline lap x1 with g/t CDI. No c/o n/v. Diet upgraded to GI soft, tolerating well. Voiding w/o difficulty. Ambulating x1 and walker to ambulate, ambulated x5 this shift. IVF @ 75, on room air and . BM this shift. Call light in reach, safety maintained and continue to monitor.

## 2022-09-06 VITALS
DIASTOLIC BLOOD PRESSURE: 54 MMHG | RESPIRATION RATE: 14 BRPM | HEART RATE: 60 BPM | WEIGHT: 121.69 LBS | BODY MASS INDEX: 19.1 KG/M2 | SYSTOLIC BLOOD PRESSURE: 123 MMHG | TEMPERATURE: 98.8 F | OXYGEN SATURATION: 100 % | HEIGHT: 67 IN

## 2022-09-06 PROBLEM — E43 SEVERE MALNUTRITION: Status: ACTIVE | Noted: 2022-09-06

## 2022-09-06 LAB
ANION GAP SERPL CALCULATED.3IONS-SCNC: 3 MMOL/L (ref 5–15)
BUN SERPL-MCNC: 5 MG/DL (ref 8–23)
BUN/CREAT SERPL: 5.3 (ref 7–25)
CALCIUM SPEC-SCNC: 9.6 MG/DL (ref 8.6–10.5)
CHLORIDE SERPL-SCNC: 107 MMOL/L (ref 98–107)
CO2 SERPL-SCNC: 30 MMOL/L (ref 22–29)
CREAT SERPL-MCNC: 0.94 MG/DL (ref 0.76–1.27)
DEPRECATED RDW RBC AUTO: 43.3 FL (ref 37–54)
EGFRCR SERPLBLD CKD-EPI 2021: 87.8 ML/MIN/1.73
ERYTHROCYTE [DISTWIDTH] IN BLOOD BY AUTOMATED COUNT: 13.9 % (ref 12.3–15.4)
GLUCOSE SERPL-MCNC: 115 MG/DL (ref 65–99)
HCT VFR BLD AUTO: 33.3 % (ref 37.5–51)
HGB BLD-MCNC: 10.3 G/DL (ref 13–17.7)
MCH RBC QN AUTO: 26.1 PG (ref 26.6–33)
MCHC RBC AUTO-ENTMCNC: 30.9 G/DL (ref 31.5–35.7)
MCV RBC AUTO: 84.5 FL (ref 79–97)
PLATELET # BLD AUTO: 291 10*3/MM3 (ref 140–450)
PMV BLD AUTO: 9.4 FL (ref 6–12)
POTASSIUM SERPL-SCNC: 3.8 MMOL/L (ref 3.5–5.2)
RBC # BLD AUTO: 3.94 10*6/MM3 (ref 4.14–5.8)
SODIUM SERPL-SCNC: 140 MMOL/L (ref 136–145)
WBC NRBC COR # BLD: 4.79 10*3/MM3 (ref 3.4–10.8)

## 2022-09-06 PROCEDURE — 25010000002 POTASSIUM CHLORIDE PER 2 MEQ OF POTASSIUM: Performed by: SPECIALIST

## 2022-09-06 PROCEDURE — 85027 COMPLETE CBC AUTOMATED: CPT | Performed by: SPECIALIST

## 2022-09-06 PROCEDURE — 80048 BASIC METABOLIC PNL TOTAL CA: CPT | Performed by: SPECIALIST

## 2022-09-06 RX ORDER — HYDROCODONE BITARTRATE AND ACETAMINOPHEN 7.5; 325 MG/1; MG/1
1 TABLET ORAL EVERY 6 HOURS PRN
Qty: 25 TABLET | Refills: 0 | Status: SHIPPED | OUTPATIENT
Start: 2022-09-06 | End: 2022-10-03 | Stop reason: ALTCHOICE

## 2022-09-06 RX ADMIN — AMLODIPINE BESYLATE 5 MG: 5 TABLET ORAL at 08:29

## 2022-09-06 RX ADMIN — ALVIMOPAN 12 MG: 12 CAPSULE ORAL at 08:28

## 2022-09-06 RX ADMIN — POTASSIUM CHLORIDE 75 ML/HR: 149 INJECTION, SOLUTION, CONCENTRATE INTRAVENOUS at 04:26

## 2022-09-06 RX ADMIN — CELECOXIB 200 MG: 200 CAPSULE ORAL at 08:29

## 2022-09-06 RX ADMIN — FAMOTIDINE 20 MG: 20 TABLET, FILM COATED ORAL at 08:29

## 2022-09-06 NOTE — PLAN OF CARE
Goal Outcome Evaluation:  Plan of Care Reviewed With: patient        Progress: no change  Outcome Evaluation: No c/o pain this shift. VSS. Pt up ad freddy in room. Tolerating GI soft diet. Midline drsg with old drainage noted, abd binder in place. IVF continue. Safety maintained.

## 2022-09-06 NOTE — DISCHARGE SUMMARY
Scarlet Fishman MD Discharge Summary    Date of Admission: 9/2/2022  Date of Discharge:  9/6/2022    Discharge Diagnosis: Benign neoplasm sigmoid colon Path pending; severe malnutrition    Presenting Problem/History of Present Illness    History and Physical as outlined in the chart    Hospital Course  Patient was admitted and underwent the above operation.  Hospital course  was uneventful.  Patient will be discharged to home.  See medication reconciliation for medications at discharge.    Procedures Performed  Procedure(s):  COLON RESECTION LAPAROSCOPIC SIGMOID OR LOW ANTERIOR       Consults:   Consults     No orders found from 8/4/2022 to 9/3/2022.          Condition on Discharge:  stable      Discharge Disposition  Home or Self Care    Discharge Medications     Discharge Medications      New Medications      Instructions Start Date   HYDROcodone-acetaminophen 7.5-325 MG per tablet  Commonly known as: Norco   1 tablet, Oral, Every 6 Hours PRN         Continue These Medications      Instructions Start Date   amLODIPine 5 MG tablet  Commonly known as: NORVASC   5 mg, Oral, Daily      carbidopa-levodopa  MG per tablet  Commonly known as: SINEMET   1 tablet, Oral, 3 Times Daily      chlorhexidine 4 % external liquid  Commonly known as: HIBICLENS   Topical, 2 Times Daily, Shower With Hibiclens Solution Twice The Day Before Surgery             Discharge Diet:     Activity at Discharge:     Follow-up Appointments  No future appointments.  Additional Instructions for the Follow-ups that You Need to Schedule     Discharge Follow-up with Specified Provider: me; 2 Weeks   As directed      To: me    Follow Up: 2 Weeks               Test Results Pending at Discharge  Pending Labs     Order Current Status    Tissue Pathology Exam Collected (09/02/22 1107)           Scarlet Fishman MD  09/06/22  08:31 CDT    Time: Time spent at discharge 30 minutes

## 2022-09-07 ENCOUNTER — READMISSION MANAGEMENT (OUTPATIENT)
Dept: CALL CENTER | Facility: HOSPITAL | Age: 72
End: 2022-09-07

## 2022-09-07 NOTE — OUTREACH NOTE
Prep Survey    Flowsheet Row Responses   [facility] facility patient discharged from? [place]   Is LACE score < 7 ? No   Emergency Room discharge w/ pulse ox? No   Eligibility Readm Mgmt   Discharge diagnosis Benign neoplasm sigmoid colon s/p COLON RESECTION LAPAROSCOPIC SIGMOID OR LOW ANTERIOR   Does the patient have one of the following disease processes/diagnoses(primary or secondary)? General Surgery   Does the patient have Home health ordered? No   Is there a DME ordered? No   Prep survey completed? Yes          LEANNE DRAKE - Registered Nurse

## 2022-09-12 ENCOUNTER — READMISSION MANAGEMENT (OUTPATIENT)
Dept: CALL CENTER | Facility: HOSPITAL | Age: 72
End: 2022-09-12

## 2022-09-12 NOTE — OUTREACH NOTE
General Surgery Week 1 Survey    Flowsheet Row Responses   [facility] facility patient discharged from? [place]   Does the patient have one of the following disease processes/diagnoses(primary or secondary)? General Surgery   Week 1 attempt successful? No   Unsuccessful attempts Attempt 1  [UTR both contact numbers]          JERRY GASCA - Registered Nurse

## 2022-09-15 ENCOUNTER — NURSE TRIAGE (OUTPATIENT)
Dept: CALL CENTER | Facility: HOSPITAL | Age: 72
End: 2022-09-15

## 2022-09-15 NOTE — TELEPHONE ENCOUNTER
"    Reason for Disposition  • Unable to have a bowel movement (BM) without laxative or enema    Additional Information  • Negative: [1] Abdomen pain is main symptom AND [2] male  • Negative: [1] Abdomen pain is main symptom AND [2] adult female  • Negative: Rectal bleeding or blood in stool is main symptom  • Negative: Rectal pain or itching is main symptom  • Negative: Constipation in a cancer patient who is currently (or recently) receiving chemotherapy or radiation therapy, or cancer patient who has metastatic or end-stage cancer and is receiving palliative care  • Negative: Patient sounds very sick or weak to the triager  • Negative: [1] Vomiting AND [2] abdomen looks much more swollen than usual  • Negative: [1] Vomiting AND [2] contains bile (green color)  • Negative: [1] Constant abdominal pain AND [2] present > 2 hours  • Negative: [1] Rectal pain or fullness from fecal impaction (rectum full of stool) AND [2] NOT better after SITZ bath, suppository or enema  • Negative: [1] Intermittent mild abdominal pain AND [2] fever  • Negative: Abdomen is more swollen than usual  • Negative: Last bowel movement (BM) > 4 days ago  • Negative: Leaking stool  • Negative: Unable to have a bowel movement (BM) without manually removing stool (using finger to pull out stool or perform disimpaction)    Answer Assessment - Initial Assessment Questions  1. STOOL PATTERN OR FREQUENCY: \"How often do you pass bowel movements (BMs)?\"  (Normal range: tid to q 3 days)  \"When was the last BM passed?\"       Sunday was last BM since surgery 09/02/2022  2. STRAINING: \"Do you have to strain to have a BM?\"       *No Answer*  3. RECTAL PAIN: \"Does your rectum hurt when the stool comes out?\" If Yes, ask: \"Do you have hemorrhoids? How bad is the pain?\"  (Scale 1-10; or mild, moderate, severe)      *No Answer*  4. STOOL COMPOSITION: \"Are the stools hard?\"       *No Answer*  5. BLOOD ON STOOLS: \"Has there been any blood on the toilet tissue or " "on the surface of the BM?\" If Yes, ask: \"When was the last time?\"       *No Answer*  6. CHRONIC CONSTIPATION: \"Is this a new problem for you?\"  If no, ask: \"How long have you had this problem?\" (days, weeks, months)   since surgery  7. CHANGES IN DIET OR HYDRATION: \"Have there been any recent changes in your diet?\" \"How much fluids are you drinking consuming on a daily basis?\"  \"How much have you had to drink today?\"      *No Answer*  8. MEDICATIONS: \"Have you been taking any new medications?\" \"Are you taking any narcotic pain medications?\" (e.g., Vicoden, Percocet, morphine, dilaudid)     Lortab  9. LAXATIVES: \"Have you been using any stool softeners, laxatives, or enemas?\"  If yes, ask \"What, how often, and when was the last time?\"  10.ACTIVITY:  \"How much walking do you do every day? on a daily basis?\"  \"Has your activity level decreased in the past week?\"         *No Answer*  11. CAUSE: \"What do you think is causing the constipation?\"         *No Answer*  12. OTHER SYMPTOMS: \"Do you have any other symptoms?\" (e.g., abdominal pain, bloating, fever, vomiting)      Normal post op pain  13. MEDICAL HISTORY: \"Do you have a history of hemorrhoids, rectal fissures, or rectal surgery or rectal abscess?\"          *No Answer*  14. PREGNANCY: \"Is there any chance you are pregnant?\" \"When was your last menstrual period?\"    na    Protocols used: CONSTIPATION-ADULT-AH      "

## 2022-09-20 ENCOUNTER — OFFICE VISIT (OUTPATIENT)
Dept: SURGERY | Facility: CLINIC | Age: 72
End: 2022-09-20

## 2022-09-20 VITALS
SYSTOLIC BLOOD PRESSURE: 133 MMHG | HEIGHT: 67 IN | DIASTOLIC BLOOD PRESSURE: 82 MMHG | OXYGEN SATURATION: 97 % | BODY MASS INDEX: 19.62 KG/M2 | HEART RATE: 107 BPM | WEIGHT: 125 LBS

## 2022-09-20 DIAGNOSIS — C18.7 ADENOCARCINOMA OF SIGMOID COLON: ICD-10-CM

## 2022-09-20 DIAGNOSIS — Z51.89 VISIT FOR WOUND CHECK: Primary | ICD-10-CM

## 2022-09-20 PROCEDURE — 99024 POSTOP FOLLOW-UP VISIT: CPT | Performed by: SPECIALIST

## 2022-09-20 NOTE — PROGRESS NOTES
"Patient: Paul ZAMORANO    YOB: 1950    Date: 09/20/2022    Primary Care Provider: Nicole Doss APRN    Vital Signs:   Vitals:    09/20/22 1042   BP: 133/82   BP Location: Left arm   Patient Position: Sitting   Cuff Size: Adult   Pulse: 107   SpO2: 97%   Weight: 56.7 kg (125 lb)   Height: 170 cm (66.93\")       Is doing well.  His bowels are moving but he needs stool softeners and I encouraged him to continue this.  He is eating well sleeping well urinating well.  Pain controlled with pills as needed.  Incisions look good.  Pathology reviewed.  We will set him up with follow-up with oncology.  I will see him in 1 month    Results Review:   I reviewed the patient's new clinical results.        Assessment / Plan:    Diagnoses and all orders for this visit:    1. Visit for wound check (Primary)    2. Adenocarcinoma of sigmoid colon (HCC)              Electronically signed by Scarlet Fishman MD  09/20/22  11:04 CDT                  "

## 2022-09-21 ENCOUNTER — READMISSION MANAGEMENT (OUTPATIENT)
Dept: CALL CENTER | Facility: HOSPITAL | Age: 72
End: 2022-09-21

## 2022-09-21 NOTE — OUTREACH NOTE
General Surgery Week 1 Survey    Flowsheet Row Responses   [facility] facility patient discharged from? [facility]   Does the patient have one of the following disease processes/diagnoses(primary or secondary)? General Surgery   Week 1 attempt successful? No   Unsuccessful attempts Attempt 2          KWASI HERNANDEZ - Registered Nurse

## 2022-09-26 ENCOUNTER — APPOINTMENT (OUTPATIENT)
Dept: CT IMAGING | Facility: HOSPITAL | Age: 72
End: 2022-09-26

## 2022-09-26 ENCOUNTER — HOSPITAL ENCOUNTER (EMERGENCY)
Facility: HOSPITAL | Age: 72
Discharge: HOME | End: 2022-09-26
Admitting: EMERGENCY MEDICINE

## 2022-09-26 VITALS
WEIGHT: 122 LBS | BODY MASS INDEX: 18.49 KG/M2 | SYSTOLIC BLOOD PRESSURE: 136 MMHG | TEMPERATURE: 98.4 F | RESPIRATION RATE: 16 BRPM | HEART RATE: 94 BPM | HEIGHT: 68 IN | DIASTOLIC BLOOD PRESSURE: 74 MMHG | OXYGEN SATURATION: 100 %

## 2022-09-26 DIAGNOSIS — K59.00 CONSTIPATION, UNSPECIFIED CONSTIPATION TYPE: Primary | ICD-10-CM

## 2022-09-26 LAB
ALBUMIN SERPL-MCNC: 4.5 G/DL (ref 3.5–5.2)
ALBUMIN/GLOB SERPL: 1.6 G/DL
ALP SERPL-CCNC: 71 U/L (ref 39–117)
ALT SERPL W P-5'-P-CCNC: 11 U/L (ref 1–41)
ANION GAP SERPL CALCULATED.3IONS-SCNC: 12 MMOL/L (ref 5–15)
AST SERPL-CCNC: 17 U/L (ref 1–40)
BASOPHILS # BLD AUTO: 0.03 10*3/MM3 (ref 0–0.2)
BASOPHILS NFR BLD AUTO: 0.4 % (ref 0–1.5)
BILIRUB SERPL-MCNC: 0.6 MG/DL (ref 0–1.2)
BUN SERPL-MCNC: 8 MG/DL (ref 8–23)
BUN/CREAT SERPL: 7.9 (ref 7–25)
CALCIUM SPEC-SCNC: 9.7 MG/DL (ref 8.6–10.5)
CHLORIDE SERPL-SCNC: 102 MMOL/L (ref 98–107)
CO2 SERPL-SCNC: 24 MMOL/L (ref 22–29)
CREAT SERPL-MCNC: 1.01 MG/DL (ref 0.76–1.27)
D-LACTATE SERPL-SCNC: 1.8 MMOL/L (ref 0.5–2)
DEPRECATED RDW RBC AUTO: 47.2 FL (ref 37–54)
EGFRCR SERPLBLD CKD-EPI 2021: 80.5 ML/MIN/1.73
EOSINOPHIL # BLD AUTO: 0.03 10*3/MM3 (ref 0–0.4)
EOSINOPHIL NFR BLD AUTO: 0.4 % (ref 0.3–6.2)
ERYTHROCYTE [DISTWIDTH] IN BLOOD BY AUTOMATED COUNT: 15.3 % (ref 12.3–15.4)
GLOBULIN UR ELPH-MCNC: 2.9 GM/DL
GLUCOSE SERPL-MCNC: 98 MG/DL (ref 65–99)
HCT VFR BLD AUTO: 35.1 % (ref 37.5–51)
HGB BLD-MCNC: 11.1 G/DL (ref 13–17.7)
IMM GRANULOCYTES # BLD AUTO: 0.03 10*3/MM3 (ref 0–0.05)
IMM GRANULOCYTES NFR BLD AUTO: 0.4 % (ref 0–0.5)
LYMPHOCYTES # BLD AUTO: 1.48 10*3/MM3 (ref 0.7–3.1)
LYMPHOCYTES NFR BLD AUTO: 18.1 % (ref 19.6–45.3)
MCH RBC QN AUTO: 26.9 PG (ref 26.6–33)
MCHC RBC AUTO-ENTMCNC: 31.6 G/DL (ref 31.5–35.7)
MCV RBC AUTO: 85 FL (ref 79–97)
MONOCYTES # BLD AUTO: 0.36 10*3/MM3 (ref 0.1–0.9)
MONOCYTES NFR BLD AUTO: 4.4 % (ref 5–12)
NEUTROPHILS NFR BLD AUTO: 6.25 10*3/MM3 (ref 1.7–7)
NEUTROPHILS NFR BLD AUTO: 76.3 % (ref 42.7–76)
NRBC BLD AUTO-RTO: 0 /100 WBC (ref 0–0.2)
PLATELET # BLD AUTO: 340 10*3/MM3 (ref 140–450)
PMV BLD AUTO: 9.7 FL (ref 6–12)
POTASSIUM SERPL-SCNC: 4.2 MMOL/L (ref 3.5–5.2)
PROT SERPL-MCNC: 7.4 G/DL (ref 6–8.5)
RBC # BLD AUTO: 4.13 10*6/MM3 (ref 4.14–5.8)
SODIUM SERPL-SCNC: 138 MMOL/L (ref 136–145)
WBC NRBC COR # BLD: 8.18 10*3/MM3 (ref 3.4–10.8)

## 2022-09-26 PROCEDURE — 99284 EMERGENCY DEPT VISIT MOD MDM: CPT

## 2022-09-26 PROCEDURE — 83605 ASSAY OF LACTIC ACID: CPT | Performed by: NURSE PRACTITIONER

## 2022-09-26 PROCEDURE — 74177 CT ABD & PELVIS W/CONTRAST: CPT

## 2022-09-26 PROCEDURE — 25010000002 IOPAMIDOL 61 % SOLUTION: Performed by: NURSE PRACTITIONER

## 2022-09-26 PROCEDURE — 85025 COMPLETE CBC W/AUTO DIFF WBC: CPT | Performed by: NURSE PRACTITIONER

## 2022-09-26 PROCEDURE — 80053 COMPREHEN METABOLIC PANEL: CPT | Performed by: NURSE PRACTITIONER

## 2022-09-26 PROCEDURE — 0 DIATRIZOATE MEGLUMINE & SODIUM PER 1 ML: Performed by: NURSE PRACTITIONER

## 2022-09-26 RX ORDER — SODIUM CHLORIDE 0.9 % (FLUSH) 0.9 %
10 SYRINGE (ML) INJECTION AS NEEDED
Status: DISCONTINUED | OUTPATIENT
Start: 2022-09-26 | End: 2022-09-26 | Stop reason: HOSPADM

## 2022-09-26 RX ADMIN — SODIUM CHLORIDE, POTASSIUM CHLORIDE, SODIUM LACTATE AND CALCIUM CHLORIDE 500 ML: 600; 310; 30; 20 INJECTION, SOLUTION INTRAVENOUS at 12:00

## 2022-09-26 RX ADMIN — IOPAMIDOL 100 ML: 612 INJECTION, SOLUTION INTRAVENOUS at 12:21

## 2022-09-26 RX ADMIN — DIATRIZOATE MEGLUMINE AND DIATRIZOATE SODIUM 120 ML: 660; 100 LIQUID ORAL; RECTAL at 16:07

## 2022-09-26 NOTE — ED PROVIDER NOTES
Subjective   History of Present Illness  Patient is a 71-year-old male presents here today with complaint of constipation.  The patient reports that he had a colon resection on September 2, 2022 by Dr. Fishman.  He states that as of today has not had a bowel movement about 6 days.  He was seen at [place] [facility] yesterday he contacted Dr. Flores and advised no enema, may do MiraLAX and push p.o. fluids.  Patient states that he is done that is only had a very small bowel movement this morning.  He states it was a small amount of hard stool.  The patient states due to this he decided come to ER today for further evaluation.  He states that he is not particularly have any abdominal pain currently, denies fever.  He denies nausea or vomiting.  He presents here today for further radiation.    History provided by:  Patient   used: No        Review of Systems   Gastrointestinal: Positive for constipation.   All other systems reviewed and are negative.      Past Medical History:   Diagnosis Date   • Colonic mass     pt states biopsy during colonoscopy was negative for cancer   • Dysuria    • Enlarged prostate    • Hypertension    • Parkinson's disease (HCC)    • Urgency of urination        No Known Allergies    Past Surgical History:   Procedure Laterality Date   • COLON RESECTION N/A 9/2/2022    Procedure: COLON RESECTION LAPAROSCOPIC SIGMOID OR LOW ANTERIOR;  Surgeon: Scarlet Fishman MD;  Location: [facility] OR;  Service: General;  Laterality: N/A;   • COLONOSCOPY N/A 8/18/2022    Procedure: COLONOSCOPY WITH ANESTHESIA;  Surgeon: Emil Moody MD;  Location: [facility] ENDOSCOPY;  Service: Gastroenterology;  Laterality: N/A;  pre anemia, LLQ pain  post  phong gaudy aprn   • CYSTOSCOPY     • CYSTOSCOPY TRANSURETHRAL RESECTION OF PROSTATE N/A 3/28/2017    Procedure: CYSTOSCOPY TRANSURETHRAL RESECTION OF PROSTATE;  Surgeon: Salbador Aguilar MD;  Location: [facility] OR;  Service:    • ENDOSCOPY N/A 8/18/2022     Procedure: ESOPHAGOGASTRODUODENOSCOPY WITH ANESTHESIA;  Surgeon: Emil Moody MD;  Location: [facility] ENDOSCOPY;  Service: Gastroenterology;  Laterality: N/A;  pre anemia, LLQ pain  post gastritis  phong osorio   • HEMORRHOIDECTOMY      AS TEENAGER       Family History   Problem Relation Age of Onset   • No Known Problems Father    • No Known Problems Mother    • Colon cancer Neg Hx    • Colon polyps Neg Hx        Social History     Socioeconomic History   • Marital status: Single   Tobacco Use   • Smoking status: Current Every Day Smoker     Packs/day: 0.50     Years: 20.00     Pack years: 10.00     Types: Cigarettes   • Smokeless tobacco: Never Used   Vaping Use   • Vaping Use: Never used   Substance and Sexual Activity   • Alcohol use: Yes     Alcohol/week: 2.0 standard drinks     Types: 2 Cans of beer per week     Comment: occ   • Drug use: No   • Sexual activity: Defer           Objective   Physical Exam  Vitals and nursing note reviewed.   Constitutional:       Appearance: Normal appearance.   HENT:      Head: Normocephalic and atraumatic.   Eyes:      Conjunctiva/sclera: Conjunctivae normal.   Cardiovascular:      Rate and Rhythm: Normal rate and regular rhythm.   Pulmonary:      Effort: Pulmonary effort is normal.      Breath sounds: Normal breath sounds.   Abdominal:      General: Bowel sounds are normal.      Palpations: Abdomen is soft.          Comments: Healing surgical incision noted.  No swelling, erythema or ecchymosis noted.  No drainage.  Wound is closed.  No pain to palpation.   Skin:     General: Skin is warm and dry.      Capillary Refill: Capillary refill takes less than 2 seconds.   Neurological:      General: No focal deficit present.      Mental Status: He is alert and oriented to person, place, and time.   Psychiatric:         Mood and Affect: Mood normal.         Behavior: Behavior normal.         Procedures           ED Course  ED Course as of 09/26/22 1703   Mon Sep 26, 2022    4384 Reviewed labs, radiology studies. Call placed to Dr. Jesus.  [LF]   9901 I discussed the patient's case with Dr. Jesus, general surgeon on-call.  He recommends that we give the patient Gastrografin, 100 mL p.o. and that the patient can follow-up with Dr. Fishman tomorrow.  Advised the patient of this.  He is agreeable plan of care.  I offered to watch him for a while after giving the Gastrografin to ensure that he has a bowel movement however he declines and states that he just wants to go home and be comfortable in his own home.  I advised him he needs to follow-up Dr. Fishman tomorrow and he is comfortable with this plan of care. [LF]      ED Course User Index  [LF] Idalia Mcmahan, APRN                                 CT Abdomen Pelvis With Contrast   Final Result   1. Moderate constipation with increased stool throughout the colon   including fecal impaction within the rectal vault. The patient's   undergone previous sigmoid resection with anastomotic suture line within   the central pelvis. I do not see evidence of complication.   2. The soft tissue organs within the upper abdomen are unremarkable.   There is no evidence of nephrolithiasis or obstructive uropathy. No   evidence of retroperitoneal adenopathy.   3. The prostate gland is heterogeneous and nodular in appearance with   mass effect on the base of the bladder.           This report was finalized on 09/26/2022 12:37 by Dr. Bolivar Guzman MD.        Labs Reviewed   CBC WITH AUTO DIFFERENTIAL - Abnormal; Notable for the following components:       Result Value    RBC 4.13 (*)     Hemoglobin 11.1 (*)     Hematocrit 35.1 (*)     Neutrophil % 76.3 (*)     Lymphocyte % 18.1 (*)     Monocyte % 4.4 (*)     All other components within normal limits   LACTIC ACID, PLASMA - Normal   COMPREHENSIVE METABOLIC PANEL    Narrative:     GFR Normal >60  Chronic Kidney Disease <60  Kidney Failure <15     CBC AND DIFFERENTIAL    Narrative:     The  following orders were created for panel order CBC & Differential.  Procedure                               Abnormality         Status                     ---------                               -----------         ------                     CBC Auto Differential[[id number]]        Abnormal            Final result                 Please view results for these tests on the individual orders.               MDM  Number of Diagnoses or Management Options  Constipation, unspecified constipation type: new and requires workup     Amount and/or Complexity of Data Reviewed  Clinical lab tests: ordered and reviewed  Tests in the radiology section of CPT®: ordered and reviewed  Discuss the patient with other providers: yes (Dr. Flor Fishman)    Patient Progress  Patient progress: stable      Final diagnoses:   Constipation, unspecified constipation type       ED Disposition  ED Disposition     ED Disposition   Discharge    Condition   Stable    Comment   --             Nicole Doss, APRN  [address] [place] [id number]  [phone]    Call in 1 day      Scarlet Fishman MD  [address]  [facility] [address]  [phone]    Call in 1 day           Medication List      No changes were made to your prescriptions during this visit.          Idalia Mcmahan, APRN  09/26/22 0463

## 2022-09-29 ENCOUNTER — READMISSION MANAGEMENT (OUTPATIENT)
Dept: CALL CENTER | Facility: HOSPITAL | Age: 72
End: 2022-09-29

## 2022-09-29 NOTE — OUTREACH NOTE
General Surgery Week 4 Survey    Flowsheet Row Responses   [facility] facility patient discharged from? [facility]   Does the patient have one of the following disease processes/diagnoses(primary or secondary)? General Surgery   Week 4 attempt successful? No          EMILY GACSA - Registered Nurse

## 2022-10-03 ENCOUNTER — LAB (OUTPATIENT)
Dept: LAB | Facility: HOSPITAL | Age: 72
End: 2022-10-03

## 2022-10-03 ENCOUNTER — CONSULT (OUTPATIENT)
Dept: ONCOLOGY | Facility: CLINIC | Age: 72
End: 2022-10-03

## 2022-10-03 VITALS
WEIGHT: 122.8 LBS | HEIGHT: 68 IN | RESPIRATION RATE: 18 BRPM | OXYGEN SATURATION: 100 % | SYSTOLIC BLOOD PRESSURE: 130 MMHG | HEART RATE: 67 BPM | BODY MASS INDEX: 18.61 KG/M2 | DIASTOLIC BLOOD PRESSURE: 78 MMHG | TEMPERATURE: 97.6 F

## 2022-10-03 DIAGNOSIS — C19 ADENOCARCINOMA OF RECTOSIGMOID JUNCTION: Primary | ICD-10-CM

## 2022-10-03 DIAGNOSIS — C19 ADENOCARCINOMA OF RECTOSIGMOID JUNCTION: ICD-10-CM

## 2022-10-03 LAB
ALBUMIN SERPL-MCNC: 4.8 G/DL (ref 3.5–5.2)
ALBUMIN/GLOB SERPL: 1.6 G/DL
ALP SERPL-CCNC: 67 U/L (ref 39–117)
ALT SERPL W P-5'-P-CCNC: <5 U/L (ref 1–41)
ANION GAP SERPL CALCULATED.3IONS-SCNC: 7 MMOL/L (ref 5–15)
AST SERPL-CCNC: 18 U/L (ref 1–40)
BASOPHILS # BLD AUTO: 0.03 10*3/MM3 (ref 0–0.2)
BASOPHILS NFR BLD AUTO: 0.5 % (ref 0–1.5)
BILIRUB SERPL-MCNC: 0.6 MG/DL (ref 0–1.2)
BUN SERPL-MCNC: 10 MG/DL (ref 8–23)
BUN/CREAT SERPL: 10.8 (ref 7–25)
CALCIUM SPEC-SCNC: 9.9 MG/DL (ref 8.6–10.5)
CEA SERPL-MCNC: 1.71 NG/ML
CHLORIDE SERPL-SCNC: 105 MMOL/L (ref 98–107)
CO2 SERPL-SCNC: 28 MMOL/L (ref 22–29)
CREAT SERPL-MCNC: 0.93 MG/DL (ref 0.76–1.27)
DEPRECATED RDW RBC AUTO: 46.5 FL (ref 37–54)
EGFRCR SERPLBLD CKD-EPI 2021: 88.9 ML/MIN/1.73
EOSINOPHIL # BLD AUTO: 0.08 10*3/MM3 (ref 0–0.4)
EOSINOPHIL NFR BLD AUTO: 1.3 % (ref 0.3–6.2)
ERYTHROCYTE [DISTWIDTH] IN BLOOD BY AUTOMATED COUNT: 15.2 % (ref 12.3–15.4)
FERRITIN SERPL-MCNC: 199.6 NG/ML (ref 30–400)
FOLATE SERPL-MCNC: 8.6 NG/ML (ref 4.78–24.2)
GLOBULIN UR ELPH-MCNC: 3 GM/DL
GLUCOSE SERPL-MCNC: 101 MG/DL (ref 65–99)
HCT VFR BLD AUTO: 35.5 % (ref 37.5–51)
HGB BLD-MCNC: 11.2 G/DL (ref 13–17.7)
IMM GRANULOCYTES # BLD AUTO: 0.02 10*3/MM3 (ref 0–0.05)
IMM GRANULOCYTES NFR BLD AUTO: 0.3 % (ref 0–0.5)
IRON 24H UR-MRATE: 61 MCG/DL (ref 59–158)
IRON SATN MFR SERPL: 19 % (ref 20–50)
LYMPHOCYTES # BLD AUTO: 2.53 10*3/MM3 (ref 0.7–3.1)
LYMPHOCYTES NFR BLD AUTO: 40.5 % (ref 19.6–45.3)
MCH RBC QN AUTO: 26.7 PG (ref 26.6–33)
MCHC RBC AUTO-ENTMCNC: 31.5 G/DL (ref 31.5–35.7)
MCV RBC AUTO: 84.5 FL (ref 79–97)
MONOCYTES # BLD AUTO: 0.32 10*3/MM3 (ref 0.1–0.9)
MONOCYTES NFR BLD AUTO: 5.1 % (ref 5–12)
NEUTROPHILS NFR BLD AUTO: 3.27 10*3/MM3 (ref 1.7–7)
NEUTROPHILS NFR BLD AUTO: 52.3 % (ref 42.7–76)
NRBC BLD AUTO-RTO: 0 /100 WBC (ref 0–0.2)
PLATELET # BLD AUTO: 297 10*3/MM3 (ref 140–450)
PMV BLD AUTO: 9.6 FL (ref 6–12)
POTASSIUM SERPL-SCNC: 3.7 MMOL/L (ref 3.5–5.2)
PROT SERPL-MCNC: 7.8 G/DL (ref 6–8.5)
RBC # BLD AUTO: 4.2 10*6/MM3 (ref 4.14–5.8)
SODIUM SERPL-SCNC: 140 MMOL/L (ref 136–145)
TIBC SERPL-MCNC: 325 MCG/DL (ref 298–536)
TRANSFERRIN SERPL-MCNC: 218 MG/DL (ref 200–360)
VIT B12 BLD-MCNC: 316 PG/ML (ref 211–946)
WBC NRBC COR # BLD: 6.25 10*3/MM3 (ref 3.4–10.8)

## 2022-10-03 PROCEDURE — 99205 OFFICE O/P NEW HI 60 MIN: CPT | Performed by: INTERNAL MEDICINE

## 2022-10-03 PROCEDURE — 82378 CARCINOEMBRYONIC ANTIGEN: CPT

## 2022-10-03 PROCEDURE — 82607 VITAMIN B-12: CPT

## 2022-10-03 PROCEDURE — 82728 ASSAY OF FERRITIN: CPT

## 2022-10-03 PROCEDURE — 82746 ASSAY OF FOLIC ACID SERUM: CPT

## 2022-10-03 PROCEDURE — 83540 ASSAY OF IRON: CPT

## 2022-10-03 PROCEDURE — 84466 ASSAY OF TRANSFERRIN: CPT

## 2022-10-03 PROCEDURE — 85025 COMPLETE CBC W/AUTO DIFF WBC: CPT

## 2022-10-03 PROCEDURE — 80053 COMPREHEN METABOLIC PANEL: CPT

## 2022-10-03 PROCEDURE — 36415 COLL VENOUS BLD VENIPUNCTURE: CPT

## 2022-10-03 RX ORDER — ONDANSETRON HYDROCHLORIDE 8 MG/1
8 TABLET, FILM COATED ORAL EVERY 8 HOURS PRN
Qty: 30 TABLET | Refills: 0 | Status: SHIPPED | OUTPATIENT
Start: 2022-10-03

## 2022-10-05 DIAGNOSIS — C19 ADENOCARCINOMA OF RECTOSIGMOID JUNCTION: Primary | ICD-10-CM

## 2022-10-05 RX ORDER — FLUOROURACIL 50 MG/ML
400 INJECTION, SOLUTION INTRAVENOUS ONCE
OUTPATIENT
Start: 2023-01-10

## 2022-10-05 RX ORDER — DEXTROSE MONOHYDRATE 50 MG/ML
250 INJECTION, SOLUTION INTRAVENOUS ONCE
OUTPATIENT
Start: 2023-01-10

## 2022-10-05 RX ORDER — FAMOTIDINE 10 MG/ML
20 INJECTION, SOLUTION INTRAVENOUS AS NEEDED
OUTPATIENT
Start: 2023-01-10

## 2022-10-05 RX ORDER — PALONOSETRON 0.05 MG/ML
0.25 INJECTION, SOLUTION INTRAVENOUS ONCE
OUTPATIENT
Start: 2023-01-10

## 2022-10-05 RX ORDER — DIPHENHYDRAMINE HYDROCHLORIDE 50 MG/ML
50 INJECTION INTRAMUSCULAR; INTRAVENOUS AS NEEDED
OUTPATIENT
Start: 2023-01-10

## 2022-10-06 ENCOUNTER — TELEPHONE (OUTPATIENT)
Dept: ONCOLOGY | Facility: CLINIC | Age: 72
End: 2022-10-06

## 2022-10-06 NOTE — TELEPHONE ENCOUNTER
Returned call to aPul and discussed with him that he can go ahead and take the Zofran if he is experiencing nausea and if he is not having nausea he can keep it to use after he has chemotherapy if he needs it at that time.

## 2022-10-06 NOTE — TELEPHONE ENCOUNTER
Caller: DENITA    Relationship to patient: SELF    Best call back number: [phone]    Patient is needing: TO KNOW IF HE IS SUPPOSED TO BE TAKING HIS ZOFRAN NOW OR HOLD OFF ON IT.

## 2022-10-13 LAB
CYTO UR: NORMAL
LAB AP CASE REPORT: NORMAL
LAB AP SYNOPTIC CHECKLIST: NORMAL
Lab: NORMAL
PATH REPORT.FINAL DX SPEC: NORMAL
PATH REPORT.GROSS SPEC: NORMAL

## 2022-10-17 ENCOUNTER — HOSPITAL ENCOUNTER (OUTPATIENT)
Dept: CT IMAGING | Facility: HOSPITAL | Age: 72
Discharge: HOME | End: 2022-10-17
Admitting: INTERNAL MEDICINE

## 2022-10-17 ENCOUNTER — CLINICAL SUPPORT (OUTPATIENT)
Dept: ONCOLOGY | Facility: CLINIC | Age: 72
End: 2022-10-17

## 2022-10-17 DIAGNOSIS — C19 ADENOCARCINOMA OF RECTOSIGMOID JUNCTION: ICD-10-CM

## 2022-10-17 DIAGNOSIS — C19 ADENOCARCINOMA OF RECTOSIGMOID JUNCTION: Primary | ICD-10-CM

## 2022-10-17 LAB — CREAT BLDA-MCNC: 1 MG/DL (ref 0.6–1.3)

## 2022-10-17 PROCEDURE — 98960 EDU&TRN PT SELF-MGMT NQHP 1: CPT | Performed by: INTERNAL MEDICINE

## 2022-10-17 PROCEDURE — 82565 ASSAY OF CREATININE: CPT

## 2022-10-17 PROCEDURE — 71260 CT THORAX DX C+: CPT

## 2022-10-17 PROCEDURE — 25010000002 IOPAMIDOL 61 % SOLUTION: Performed by: INTERNAL MEDICINE

## 2022-10-17 RX ADMIN — IOPAMIDOL 100 ML: 612 INJECTION, SOLUTION INTRAVENOUS at 15:26

## 2022-10-17 NOTE — PROGRESS NOTES
Subjective     PATIENT NAME:  Paul MERCADO  YOB: 1950  PATIENTS AGE:  71 y.o.  PATIENTS SEX:  male  DATE OF SERVICE:  10/17/2022  PROVIDER:  No name on file      ____________________PATIENT EDUCATION____________________    PATIENT EDUCATION:  Today I met with the patient Paul Mercado and his daughter Jasson, to discuss the chemotherapy regimen Folfox Oxaliplatin, Leucovorin, 5-FU recommended for treatment of his disease Rectosigmoid Cancer.  The patient was given explanation of treatment premed side effects including office policy that prohibits patients to drive if sedating medications are administered, MD explanation given regarding benefits, side effects, toxicities and goals of treatment.  The patient received a Chemotherapy/Biotherapy Plan Summary including diagnosis and specific treatment plan.    SIDE EFFECTS:  Common side effects were discussed with the patient and/or significant other.  Discussion included hair loss/discoloration, anemia/fatigue, infection/chills/fever, appetite, bleeding risk/precautions, constipation, diarrhea, mouth sores, taste alteration, loss of appetite,nausea/vomiting, peripheral neuropathy, skin/nail changes, rash, muscle aches/weakness, photosensitivity, weight gain/loss, hearing loss, dizziness,  sterility, high blood pressure, heart damage, liver damage, lung damage, kidney damage, DVT/PE risk, fluid retention, pleural/pericardial effusion, somnolence, electrolyte/LFT imbalance, vein exercises and/or the possible need for vascular access/port placement.  The patient was advice that although uncommon, leakage of an infused medication from the vein or venous access device (port) may lead to skin breakdown and/or other tissue damage.  The patient was advised that he/she may have pain, bleeding, and/or bruising from the insertion of a needle in their vein or venous access device (port).  The patient was further advised that, in spite of proper technique,  infection with redness and irritation may rarely occur at the site where the needle was inserted.  The patient was advised that if complications occur, additional medical treatment is available.    Discussion also included side effects specific to drugs in the treatment plan, specifically: Oxaliplatin (cold Sensitivity) Leucovorin, 5-FU     protection during sexual relations.    A total of  50 minutes were spent with the patient, with 100% of time spent in education and counseling.

## 2022-10-18 ENCOUNTER — TELEPHONE (OUTPATIENT)
Dept: RADIATION ONCOLOGY | Facility: HOSPITAL | Age: 72
End: 2022-10-18

## 2022-10-18 ENCOUNTER — TELEPHONE (OUTPATIENT)
Dept: ONCOLOGY | Facility: CLINIC | Age: 72
End: 2022-10-18

## 2022-10-18 DIAGNOSIS — E04.2 MULTIPLE THYROID NODULES: ICD-10-CM

## 2022-10-18 DIAGNOSIS — C19 ADENOCARCINOMA OF RECTOSIGMOID JUNCTION: Primary | ICD-10-CM

## 2022-10-18 NOTE — TELEPHONE ENCOUNTER
CELESTE spoke to Mr. 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 CELESTE explained role and source of support. He is 71 years old and his daughter lives with him. His support system includes his daughter and son. He plans to drive himself to treatments but states his family will drive him to his first one. He is concerned about possible medical bills he may receive. CELESTE did inform him about the financial counselors and the [facility] Financial Assistance Application. He was states “the treatments sound rough.” Emotional support and reassurance provided. He does not take any medication for anxiety or depression and he does not see a counselor. Mr. Mercado states being outside helps to relax him. He has been feeling easily fatigued and states he started B-12 last week. CELESTE did explain support and peer to peer groups. His preference is to start treatment after Thanksgiving. CELESTE will follow up with Mr. Mercado once he starts treatment.

## 2022-10-18 NOTE — TELEPHONE ENCOUNTER
PT HAS BEEN NOTIFIED OF CT SCAN RESULTS. MULTIPLE THYROID NODULES WERE SEEN. PT NEEDS TO BE SEEN BY ENT TO FURTHER EVALUATE. PT V/U      ----- Message from Freddie Layne MD sent at 10/17/2022  5:22 PM CDT -----  Refer to ENT re:  multiple thyroid nodules  ----- Message -----  From: Interface, Rad Results [place]  Sent: 10/17/2022   5:07 PM CDT  To: Freddie Layne MD

## 2022-10-20 ENCOUNTER — TELEPHONE (OUTPATIENT)
Dept: OTOLARYNGOLOGY | Facility: CLINIC | Age: 72
End: 2022-10-20

## 2022-10-25 ENCOUNTER — OFFICE VISIT (OUTPATIENT)
Dept: SURGERY | Facility: CLINIC | Age: 72
End: 2022-10-25

## 2022-10-25 VITALS
WEIGHT: 122.8 LBS | SYSTOLIC BLOOD PRESSURE: 165 MMHG | HEIGHT: 68 IN | BODY MASS INDEX: 18.61 KG/M2 | HEART RATE: 76 BPM | DIASTOLIC BLOOD PRESSURE: 84 MMHG

## 2022-10-25 DIAGNOSIS — C19 ADENOCARCINOMA OF RECTOSIGMOID JUNCTION: Primary | ICD-10-CM

## 2022-10-25 PROCEDURE — 99024 POSTOP FOLLOW-UP VISIT: CPT | Performed by: SPECIALIST

## 2022-10-25 NOTE — PROGRESS NOTES
Patient: Paul ZAMORANO    YOB: 1950    Date: 10/25/2022    Primary Care Provider: Phong Rainey APRN    Chief Complaint   Patient presents with   • Follow-up       Subjective .     History of present illness:   He saw Dr. Slater who recommends chemotherapy and needs a port.  He has had no prior ports pacemakers central lines or upper chest trauma    The following portions of the patient's history were reviewed and updated as appropriate: allergies, current medications, past family history, past medical history, past social history, past surgical history and problem list.      History:  Past Medical History:   Diagnosis Date   • Colonic mass     pt states biopsy during colonoscopy was negative for cancer   • Dysuria    • Enlarged prostate    • Hypertension    • Parkinson's disease (HCC)    • Urgency of urination           Past Surgical History:   Procedure Laterality Date   • COLON RESECTION N/A 9/2/2022    Procedure: COLON RESECTION LAPAROSCOPIC SIGMOID OR LOW ANTERIOR;  Surgeon: Scarlet Fishman MD;  Location: [facility] OR;  Service: General;  Laterality: N/A;   • COLONOSCOPY N/A 8/18/2022    Procedure: COLONOSCOPY WITH ANESTHESIA;  Surgeon: Emil Moody MD;  Location: [facility] ENDOSCOPY;  Service: Gastroenterology;  Laterality: N/A;  pre anemia, LLQ pain  post  phong rainey aprsyed   • CYSTOSCOPY     • CYSTOSCOPY TRANSURETHRAL RESECTION OF PROSTATE N/A 3/28/2017    Procedure: CYSTOSCOPY TRANSURETHRAL RESECTION OF PROSTATE;  Surgeon: Salbador Aguilar MD;  Location: [facility] OR;  Service:    • ENDOSCOPY N/A 8/18/2022    Procedure: ESOPHAGOGASTRODUODENOSCOPY WITH ANESTHESIA;  Surgeon: Emil Moody MD;  Location: [facility] ENDOSCOPY;  Service: Gastroenterology;  Laterality: N/A;  pre anemia, LLQ pain  post gastritis  phong gaudy aprn   • HEMORRHOIDECTOMY      AS TEENAGER       Family History   Problem Relation Age of Onset   • No Known Problems Father    • No Known Problems Mother    •  [facility]  eMERGENCY dEPARTMENT eNCOUnter      Pt Name: Kenneth Cruz  MRN: [id number]  [place] 1977  Date of evaluation: 6/13/2019      CHIEF COMPLAINT       Chief Complaint   Patient presents with    Abdominal Pain         HISTORY OF PRESENT ILLNESS      The patient presents to the emergency department with abdominal pain. She says the pain started about 10:30 this morning. She had vomiting without diarrhea that abated about 2 pm or so, but then came back. She has not been vomiting blood. She says it's been a yellow emesis. The patient has had a cholecystectomy in the past.  She is not had diarrhea. She says the pain radiates up into her chest but is mostly in epigastric area. She rates it as a 10/10 right now. It is hard for her to describe it. The patient says it is not crampy however. The patient denies back pain. She denies dyspnea. She denies leg pain. The patient does not have a doctor. REVIEW OF SYSTEMS       All systems reviewed and negative unless noted in HPI. The patient denies fever or constitutional symptoms. Denies vision change. Denies any sore throat or rhinorrhea. Denies any neck pain or stiffness. Denies chest pain or shortness of breath. Epigastric pain with vomiting as noted in HPI. Denies any dysuria. Denies urinary frequency or hematuria. Denies musculoskeletal injury or pain. Denies any weakness, numbness or focal neurologic deficit. Denies any skin rash or edema. No recent psychiatric issues. No easy bruising or bleeding. Denies any polyuria, polydypsia or history of immunocompromise. PAST MEDICAL HISTORY    has a past medical history of Cancer ([address] SURGICAL HISTORY      has a past surgical history that includes Cholecystectomy. CURRENT MEDICATIONS       Previous Medications    No medications on file       ALLERGIES     has No Known Allergies. FAMILY HISTORY     has no family status information on file. "Colon cancer Neg Hx    • Colon polyps Neg Hx        Social History     Tobacco Use   • Smoking status: Every Day     Packs/day: 0.50     Years: 20.00     Pack years: 10.00     Types: Cigarettes   • Smokeless tobacco: Never   Vaping Use   • Vaping Use: Never used   Substance Use Topics   • Alcohol use: Yes     Alcohol/week: 2.0 standard drinks     Types: 2 Cans of beer per week     Comment: occ   • Drug use: No       Allergies:  No Known Allergies    Medications:     Current Outpatient Medications:   •  amLODIPine (NORVASC) 5 MG tablet, Take 5 mg by mouth Daily., Disp: , Rfl:   •  chlorhexidine (HIBICLENS) 4 % external liquid, Apply  topically to the appropriate area as directed 2 (Two) Times a Day. Shower With Hibiclens Solution Twice The Day Before Surgery, Disp: 236 mL, Rfl: 0  •  ondansetron (ZOFRAN) 8 MG tablet, Take 1 tablet by mouth Every 8 (Eight) Hours As Needed for Nausea or Vomiting., Disp: 30 tablet, Rfl: 0  •  carbidopa-levodopa (SINEMET)  MG per tablet, Take 1 tablet by mouth 3 (Three) Times a Day for 90 days., Disp: 90 tablet, Rfl: 2    Objective     Vital Signs:   Vitals:    10/25/22 1209   BP: 165/84   BP Location: Right arm   Patient Position: Sitting   Cuff Size: Adult   Pulse: 76   Weight: 55.7 kg (122 lb 12.7 oz)   Height: 172.7 cm (67.99\")       Physical Exam:     General Appearance:    Alert, cooperative, in no acute distress   Head:    Normocephalic, without obvious abnormality, atraumatic   Eyes:            Lids and lashes normal, conjunctivae and sclerae normal, no  icterus, no pallor, corneas clear,   Ears:    Ears appear intact with no abnormalities noted   Breast:     deferred   Lungs:     Clear to auscultation,respirations regular, even and              Unlabored    Heart:    Regular rhythm and normal rate, no murmur, no gallop.   Chest Wall:    No abnormalities observed   Abdomen    Rectal:    Soft his incision is healing well.  I can feel some of the suture inferiorly.  He is " family history is not on file. SOCIAL HISTORY      reports that she has never smoked. She has never used smokeless tobacco. She reports that she drank alcohol. PHYSICAL EXAM     INITIAL VITALS:  height is 5' 7\" (1.702 m) and weight is 263 lb (119.3 kg). Her tympanic temperature is 97.9 °F (36.6 °C). Her blood pressure is 140/63 (abnormal) and her pulse is 89. Her respiration is 18 and oxygen saturation is 98%. Patient is alert and oriented, in mild to moderate distress due to pain. HEENT is atraumatic. Pupils are PERRL at 4 mm with normal extraocular motion. Mucous membranes moist.  Recent tooth pain. Neck is supple with no lymphadenopathy. No JVD. No meningismus. Heart sounds regular rate and rhythm with no gallops, murmurs, or rubs. Lungs clear, no wheezes, rales or rhonchi. Abdomen: obese, soft, with subjective discomfort in epigastric area, only. No pulsatile mass. Normal bowel sounds are noted. No rebound or guarding. Musculoskeletal exam shows no evidence of trauma. Normal distal pulses in all extremities. Skin: no rash or edema. Neurological exam reveals cranial nerves 2 through 12 grossly intact. Patient has equal  and normal deep tendon reflexes. Psychiatric: no hallucinations or suicidal ideation. Lymphatics.:  No lymphadenopathy. DIFFERENTIAL DIAGNOSIS/ MDM:     Pancreatitis, gastritis, AMI, ACS, UTI    DIAGNOSTIC RESULTS     EKG: All EKG's are interpreted by the Emergency Department Physician who either signs or Co-signs this chart in the absence of a cardiologist.    Sinus 101 with no ischemia. Axis 42, , QRS 82, . RADIOLOGY:   I directly visualized the following  images and reviewed the radiologist interpretations:  XR CHEST PORTABLE   Final Result   No acute process. CT ABDOMEN PELVIS W IV CONTRAST   Final Result   No acute abdominal or pelvic abnormality.          XR CHEST PORTABLE (Final result)   Result time 06/14/19 00:00:29   Final result by Tammi Correa MD (06/14/19 00:00:29)                Impression:    No acute process. Narrative:    EXAMINATION:  ONE XRAY VIEW OF THE CHEST    6/13/2019 11:51 pm    COMPARISON:  None. HISTORY:  ORDERING SYSTEM PROVIDED HISTORY: chest pain  TECHNOLOGIST PROVIDED HISTORY:  chest pain  Ordering Physician Provided Reason for Exam: EPIGASTRIC PAIN STARTED TODAY  NAUSEA AND VOMITING YELLOW EMESIS    FINDINGS:  The lungs are without acute focal process.  There is no effusion or  pneumothorax. The cardiomediastinal silhouette is without acute process. The  osseous structures are without acute process.                    CT ABDOMEN PELVIS W IV CONTRAST (Final result)   Result time 06/14/19 00:03:33   Final result by Marian Olivas MD (06/14/19 00:03:33)                Impression:    No acute abdominal or pelvic abnormality. Narrative:    EXAMINATION:  CT OF THE ABDOMEN AND PELVIS WITH CONTRAST 6/13/2019 11:38 pm    TECHNIQUE:  CT of the abdomen and pelvis was performed with the administration of  intravenous contrast. Multiplanar reformatted images are provided for review. Dose modulation, iterative reconstruction, and/or weight based adjustment of  the mA/kV was utilized to reduce the radiation dose to as low as reasonably  achievable. COMPARISON:  None. HISTORY:  ORDERING SYSTEM PROVIDED HISTORY: Epigastric pain  TECHNOLOGIST PROVIDED HISTORY:  IV Only Contrast  Ordering Physician Provided Reason for Exam: PATIENT HAS EPIGASTRIC PAIN  NAUSEA AND YELLOW EMESIS H/O CHOLECYSTECTOMY    FINDINGS:  Lower Chest: The lung bases are without consolidation effusion.  The  visualized cardiac structures are unremarkable. Organs: The liver and spleen are normal size and overall attenuation.   Gallbladder has been removed.  The pancreas and adrenal glands are  unremarkable.  The kidneys are without obstructive uropathy.  The ureters are  not dilated.  The urinary very thin    Deferred   Extremities:   Moves all extremities well, no edema, no cyanosis, no          redness   Pulses:   Pulses palpable and equal bilaterally   Skin:   No bleeding, bruising or rash   Lymph nodes:   No palpable adenopathy   Neurologic:   Cranial nerves 2 - 12 grossly intact.         Results Review:   I reviewed the patient's new clinical results.        Assessment / Plan:    Diagnoses and all orders for this visit:    1. Adenocarcinoma of rectosigmoid junction (HCC) (Primary)  -     Case Request; Standing  -     Comprehensive metabolic panel; Future  -     XR chest 1 vw; Future  -     ceFAZolin (ANCEF) 2 g in sodium chloride 0.9 % 100 mL IVPB  -     Case Request    Other orders  -     Follow Anesthesia Guidelines / Protocol; Future  -     Obtain Informed Consent; Future  -     Provide NPO Instructions to Patient; Future  -     Chlorhexidine Skin Prep; Future  -     Follow Anesthesia Guidelines / Protocol; Standing  -     Verify / Perform Chlorhexidine Skin Prep; Standing  -     Verify / Perform Chlorhexidine Skin Prep if Indicated (If Not Already Completed); Standing  -     Oxygen Therapy-; Standing  -     Notify physician (specify); Standing        BMI is within normal parameters. No other follow-up for BMI required.    We will proceed with port placement.  The risks of bleeding infection injury to the artery lung or other structures as well as long-term risks of the port all discussed      Electronically signed by Scarlet Fishman MD  10/25/22  12:31 CDT                   bladder is unremarkable. GI/Bowel: The stomach is unremarkable.  Loops of small bowel are normal in  caliber without evidence for obstruction.  The colon contains air and fecal  residue.  The appendix is normal.  There is no intraperitoneal free air or  free fluid. Pelvis: There is a fibroid within the uterine fundus.  The uterus and adnexal  structures are otherwise age-appropriate. Peritoneum/Retroperitoneum: The psoas muscles are symmetric.  The abdominal  aorta is normal in caliber.  The inferior vena cava is unremarkable. Sydni   is no retroperitoneal or mesenteric adenopathy. Bones/Soft Tissues:  The extra-abdominal soft tissues are unremarkable. Sydni Steffany  is no acute osseous abnormality.                        LABS:  Results for orders placed or performed during the hospital encounter of 19   CBC Auto Differential   Result Value Ref Range    WBC 14.7 (H) 3.5 - 11.0 k/uL    RBC 4.50 4.0 - 5.2 m/uL    Hemoglobin 13.4 12.0 - 16.0 g/dL    Hematocrit 39.3 36 - 46 %    MCV 87.4 80 - 100 fL    MCH 29.8 26 - 34 pg    MCHC 34.1 31 - 37 g/dL    RDW 13.2 11.0 - 14.5 %    Platelets 186 167 - 904 k/uL    MPV 7.8 6.0 - 12.0 fL    NRBC Automated NOT REPORTED per 100 WBC    Differential Type NOT REPORTED     Immature Granulocytes NOT REPORTED 0 %    Absolute Immature Granulocyte NOT REPORTED 0.00 - 0.30 k/uL    WBC Morphology NOT REPORTED     RBC Morphology NOT REPORTED     Platelet Estimate NOT REPORTED     Seg Neutrophils 66 43 - 77 %    Lymphocytes 17 16 - 46 %    Monocytes 7 4 - 11 %    Eosinophils % 9 (H) 1 - 7 %    Basophils 1 0 - 1 %    Segs Absolute 9.80 (H) 1.8 - 7.7 k/uL    Absolute Lymph # 2.40 1.0 - 4.8 k/uL    Absolute Mono # 1.00 0.1 - 1.2 k/uL    Absolute Eos # 1.40 (H) 0.0 - 0.4 k/uL    Basophils # 0.10 0.0 - 0.2 k/uL   Basic Metabolic Panel   Result Value Ref Range    Glucose 122 (H) 70 - 99 mg/dL    BUN 11 6 - 20 mg/dL    CREATININE 0.63 0.50 - 0.90 mg/dL    Bun/Cre Ratio 17 9 - 20    Calcium 8.7 8.6 - 10.4 mg/dL    Sodium 138 135 - 144 mmol/L    Potassium 3.9 3.7 - 5.3 mmol/L    Chloride 102 98 - 107 mmol/L    CO2 23 20 - 31 mmol/L    Anion Gap 13 9 - 17 mmol/L    GFR Non-African American >60 >60 mL/min    GFR African American >60 >60 mL/min    GFR Comment          GFR Staging NOT REPORTED    Troponin   Result Value Ref Range    Troponin, High Sensitivity <6 0 - 14 ng/L    Troponin T NOT REPORTED <0.03 ng/mL    Troponin Interp NOT REPORTED    Hepatic Function Panel   Result Value Ref Range    Alb 4.0 3.5 - 5.2 g/dL    Alkaline Phosphatase 104 35 - 104 U/L    ALT 13 5 - 33 U/L    AST 25 <32 U/L    Total Bilirubin 0.29 (L) 0.3 - 1.2 mg/dL    Bilirubin, Direct 0.08 <0.31 mg/dL    Bilirubin, Indirect 0.21 0.00 - 1.00 mg/dL    Total Protein 6.7 6.4 - 8.3 g/dL    Globulin 2.7 1.5 - 3.8 g/dL    Albumin/Globulin Ratio 1.5 1.0 - 2.5   Lipase   Result Value Ref Range    Lipase 45 13 - 60 U/L   Amylase   Result Value Ref Range    Amylase 34 28 - 100 U/L   Urinalysis Reflex to Culture   Result Value Ref Range    Color, UA NOT REPORTED YELLOW    Turbidity UA NOT REPORTED CLEAR    Glucose, Ur NEGATIVE NEGATIVE    Bilirubin Urine NEGATIVE NEGATIVE    Ketones, Urine NEGATIVE NEGATIVE    Specific Gravity, UA 1.020 1.010 - 1.025    Urine Hgb NEGATIVE NEGATIVE    pH, UA 7.0 (H) 5.0 - 6.0    Protein, UA NEGATIVE NEGATIVE    Urobilinogen, Urine Normal Normal    Nitrite, Urine NEGATIVE NEGATIVE    Leukocyte Esterase, Urine NEGATIVE NEGATIVE    Urinalysis Comments NOT REPORTED    HCG Qualitative, Serum   Result Value Ref Range    hCG Qual NEGATIVE NEGATIVE   Microscopic Urinalysis   Result Value Ref Range    -          WBC, UA None 0 - 4 /HPF    RBC, UA 0 TO 4 0 - 4 /HPF    Casts UA NOT REPORTED 0 - 2 /LPF    Crystals UA NOT REPORTED None /HPF    Epithelial Cells UA 0 TO 4 0 - 5 /HPF    Renal Epithelial, Urine NOT REPORTED 0 /HPF    Bacteria, UA None None    Mucus, UA NOT REPORTED None    Trichomonas, UA NOT REPORTED None MD   Attending Emergency Physician         Ran Beal MD  06/14/19 6640

## 2022-10-31 ENCOUNTER — PRE-ADMISSION TESTING (OUTPATIENT)
Dept: PREADMISSION TESTING | Facility: HOSPITAL | Age: 72
End: 2022-10-31

## 2022-10-31 ENCOUNTER — HOSPITAL ENCOUNTER (OUTPATIENT)
Dept: GENERAL RADIOLOGY | Facility: HOSPITAL | Age: 72
Discharge: HOME | End: 2022-10-31

## 2022-10-31 VITALS
HEIGHT: 67 IN | OXYGEN SATURATION: 100 % | DIASTOLIC BLOOD PRESSURE: 81 MMHG | BODY MASS INDEX: 19.72 KG/M2 | RESPIRATION RATE: 18 BRPM | SYSTOLIC BLOOD PRESSURE: 153 MMHG | WEIGHT: 125.66 LBS | HEART RATE: 79 BPM

## 2022-10-31 DIAGNOSIS — C19 ADENOCARCINOMA OF RECTOSIGMOID JUNCTION: ICD-10-CM

## 2022-10-31 LAB
ALBUMIN SERPL-MCNC: 4.6 G/DL (ref 3.5–5.2)
ALBUMIN/GLOB SERPL: 1.3 G/DL
ALP SERPL-CCNC: 84 U/L (ref 39–117)
ALT SERPL W P-5'-P-CCNC: 12 U/L (ref 1–41)
ANION GAP SERPL CALCULATED.3IONS-SCNC: 8 MMOL/L (ref 5–15)
AST SERPL-CCNC: 17 U/L (ref 1–40)
BILIRUB SERPL-MCNC: 0.6 MG/DL (ref 0–1.2)
BUN SERPL-MCNC: 9 MG/DL (ref 8–23)
BUN/CREAT SERPL: 9.5 (ref 7–25)
CALCIUM SPEC-SCNC: 10.2 MG/DL (ref 8.6–10.5)
CHLORIDE SERPL-SCNC: 104 MMOL/L (ref 98–107)
CO2 SERPL-SCNC: 30 MMOL/L (ref 22–29)
CREAT SERPL-MCNC: 0.95 MG/DL (ref 0.76–1.27)
DEPRECATED RDW RBC AUTO: 48.6 FL (ref 37–54)
EGFRCR SERPLBLD CKD-EPI 2021: 86.6 ML/MIN/1.73
ERYTHROCYTE [DISTWIDTH] IN BLOOD BY AUTOMATED COUNT: 15.4 % (ref 12.3–15.4)
GLOBULIN UR ELPH-MCNC: 3.6 GM/DL
GLUCOSE SERPL-MCNC: 98 MG/DL (ref 65–99)
HCT VFR BLD AUTO: 36.7 % (ref 37.5–51)
HGB BLD-MCNC: 11.2 G/DL (ref 13–17.7)
MCH RBC QN AUTO: 26.2 PG (ref 26.6–33)
MCHC RBC AUTO-ENTMCNC: 30.5 G/DL (ref 31.5–35.7)
MCV RBC AUTO: 85.9 FL (ref 79–97)
PLATELET # BLD AUTO: 285 10*3/MM3 (ref 140–450)
PMV BLD AUTO: 9.6 FL (ref 6–12)
POTASSIUM SERPL-SCNC: 3.9 MMOL/L (ref 3.5–5.2)
PROT SERPL-MCNC: 8.2 G/DL (ref 6–8.5)
RBC # BLD AUTO: 4.27 10*6/MM3 (ref 4.14–5.8)
SODIUM SERPL-SCNC: 142 MMOL/L (ref 136–145)
WBC NRBC COR # BLD: 5.27 10*3/MM3 (ref 3.4–10.8)

## 2022-10-31 PROCEDURE — 36415 COLL VENOUS BLD VENIPUNCTURE: CPT

## 2022-10-31 PROCEDURE — 71045 X-RAY EXAM CHEST 1 VIEW: CPT

## 2022-10-31 PROCEDURE — 80053 COMPREHEN METABOLIC PANEL: CPT

## 2022-10-31 PROCEDURE — 85027 COMPLETE CBC AUTOMATED: CPT

## 2022-11-07 ENCOUNTER — ANESTHESIA EVENT (OUTPATIENT)
Dept: PERIOP | Facility: HOSPITAL | Age: 72
End: 2022-11-07

## 2022-11-07 ENCOUNTER — HOSPITAL ENCOUNTER (OUTPATIENT)
Facility: HOSPITAL | Age: 72
Setting detail: HOSPITAL OUTPATIENT SURGERY
Discharge: HOME | End: 2022-11-07
Attending: SPECIALIST | Admitting: SPECIALIST

## 2022-11-07 ENCOUNTER — APPOINTMENT (OUTPATIENT)
Dept: GENERAL RADIOLOGY | Facility: HOSPITAL | Age: 72
End: 2022-11-07

## 2022-11-07 ENCOUNTER — ANESTHESIA (OUTPATIENT)
Dept: PERIOP | Facility: HOSPITAL | Age: 72
End: 2022-11-07

## 2022-11-07 VITALS
RESPIRATION RATE: 18 BRPM | TEMPERATURE: 96.4 F | SYSTOLIC BLOOD PRESSURE: 156 MMHG | HEART RATE: 62 BPM | DIASTOLIC BLOOD PRESSURE: 75 MMHG | OXYGEN SATURATION: 100 %

## 2022-11-07 DIAGNOSIS — C19 ADENOCARCINOMA OF RECTOSIGMOID JUNCTION: ICD-10-CM

## 2022-11-07 PROCEDURE — 71045 X-RAY EXAM CHEST 1 VIEW: CPT

## 2022-11-07 PROCEDURE — 25010000002 CEFAZOLIN PER 500 MG: Performed by: SPECIALIST

## 2022-11-07 PROCEDURE — 25010000002 HEPARIN LOCK FLUSH PER 10 UNITS: Performed by: SPECIALIST

## 2022-11-07 PROCEDURE — 0 LIDOCAINE 1 % SOLUTION: Performed by: SPECIALIST

## 2022-11-07 PROCEDURE — 36561 INSERT TUNNELED CV CATH: CPT | Performed by: SPECIALIST

## 2022-11-07 PROCEDURE — 77001 FLUOROGUIDE FOR VEIN DEVICE: CPT | Performed by: SPECIALIST

## 2022-11-07 PROCEDURE — 25010000002 PROPOFOL 10 MG/ML EMULSION: Performed by: NURSE ANESTHETIST, CERTIFIED REGISTERED

## 2022-11-07 PROCEDURE — C1788 PORT, INDWELLING, IMP: HCPCS | Performed by: SPECIALIST

## 2022-11-07 PROCEDURE — 76000 FLUOROSCOPY <1 HR PHYS/QHP: CPT

## 2022-11-07 DEVICE — PRT INTRO VASC/INTERV VORTEX FILL/HL DETACH/POLYURET/CATH 8F: Type: IMPLANTABLE DEVICE | Site: CHEST | Status: FUNCTIONAL

## 2022-11-07 RX ORDER — OXYCODONE AND ACETAMINOPHEN 7.5; 325 MG/1; MG/1
2 TABLET ORAL EVERY 4 HOURS PRN
Status: DISCONTINUED | OUTPATIENT
Start: 2022-11-07 | End: 2022-11-07 | Stop reason: HOSPADM

## 2022-11-07 RX ORDER — ONDANSETRON 4 MG/1
4 TABLET, FILM COATED ORAL ONCE AS NEEDED
Status: DISCONTINUED | OUTPATIENT
Start: 2022-11-07 | End: 2022-11-07 | Stop reason: HOSPADM

## 2022-11-07 RX ORDER — LIDOCAINE HYDROCHLORIDE 10 MG/ML
0.5 INJECTION, SOLUTION EPIDURAL; INFILTRATION; INTRACAUDAL; PERINEURAL ONCE AS NEEDED
Status: DISCONTINUED | OUTPATIENT
Start: 2022-11-07 | End: 2022-11-07 | Stop reason: HOSPADM

## 2022-11-07 RX ORDER — ONDANSETRON 2 MG/ML
4 INJECTION INTRAMUSCULAR; INTRAVENOUS ONCE AS NEEDED
Status: DISCONTINUED | OUTPATIENT
Start: 2022-11-07 | End: 2022-11-07 | Stop reason: HOSPADM

## 2022-11-07 RX ORDER — LIDOCAINE HYDROCHLORIDE 10 MG/ML
INJECTION, SOLUTION INFILTRATION; PERINEURAL AS NEEDED
Status: DISCONTINUED | OUTPATIENT
Start: 2022-11-07 | End: 2022-11-07 | Stop reason: HOSPADM

## 2022-11-07 RX ORDER — DROPERIDOL 2.5 MG/ML
0.62 INJECTION, SOLUTION INTRAMUSCULAR; INTRAVENOUS ONCE AS NEEDED
Status: DISCONTINUED | OUTPATIENT
Start: 2022-11-07 | End: 2022-11-07 | Stop reason: HOSPADM

## 2022-11-07 RX ORDER — FLUMAZENIL 0.1 MG/ML
0.2 INJECTION INTRAVENOUS AS NEEDED
Status: DISCONTINUED | OUTPATIENT
Start: 2022-11-07 | End: 2022-11-07 | Stop reason: HOSPADM

## 2022-11-07 RX ORDER — SODIUM CHLORIDE 0.9 % (FLUSH) 0.9 %
3-10 SYRINGE (ML) INJECTION AS NEEDED
Status: DISCONTINUED | OUTPATIENT
Start: 2022-11-07 | End: 2022-11-07 | Stop reason: HOSPADM

## 2022-11-07 RX ORDER — SODIUM CHLORIDE 0.9 % (FLUSH) 0.9 %
3 SYRINGE (ML) INJECTION EVERY 12 HOURS SCHEDULED
Status: DISCONTINUED | OUTPATIENT
Start: 2022-11-07 | End: 2022-11-07 | Stop reason: HOSPADM

## 2022-11-07 RX ORDER — SODIUM CHLORIDE, SODIUM LACTATE, POTASSIUM CHLORIDE, CALCIUM CHLORIDE 600; 310; 30; 20 MG/100ML; MG/100ML; MG/100ML; MG/100ML
100 INJECTION, SOLUTION INTRAVENOUS CONTINUOUS
Status: DISCONTINUED | OUTPATIENT
Start: 2022-11-07 | End: 2022-11-07 | Stop reason: HOSPADM

## 2022-11-07 RX ORDER — HEPARIN SODIUM (PORCINE) LOCK FLUSH IV SOLN 100 UNIT/ML 100 UNIT/ML
SOLUTION INTRAVENOUS AS NEEDED
Status: DISCONTINUED | OUTPATIENT
Start: 2022-11-07 | End: 2022-11-07 | Stop reason: HOSPADM

## 2022-11-07 RX ORDER — BUPIVACAINE HCL/0.9 % NACL/PF 0.1 %
2 PLASTIC BAG, INJECTION (ML) EPIDURAL ONCE
Status: COMPLETED | OUTPATIENT
Start: 2022-11-07 | End: 2022-11-07

## 2022-11-07 RX ORDER — HYDROCODONE BITARTRATE AND ACETAMINOPHEN 7.5; 325 MG/1; MG/1
1 TABLET ORAL EVERY 4 HOURS PRN
Qty: 15 TABLET | Refills: 0 | Status: SHIPPED | OUTPATIENT
Start: 2022-11-07

## 2022-11-07 RX ORDER — PROPOFOL 10 MG/ML
VIAL (ML) INTRAVENOUS AS NEEDED
Status: DISCONTINUED | OUTPATIENT
Start: 2022-11-07 | End: 2022-11-07 | Stop reason: SURG

## 2022-11-07 RX ORDER — NALOXONE HCL 0.4 MG/ML
0.4 VIAL (ML) INJECTION AS NEEDED
Status: DISCONTINUED | OUTPATIENT
Start: 2022-11-07 | End: 2022-11-07 | Stop reason: HOSPADM

## 2022-11-07 RX ORDER — SODIUM CHLORIDE 0.9 % (FLUSH) 0.9 %
3 SYRINGE (ML) INJECTION AS NEEDED
Status: DISCONTINUED | OUTPATIENT
Start: 2022-11-07 | End: 2022-11-07 | Stop reason: HOSPADM

## 2022-11-07 RX ORDER — SODIUM CHLORIDE, SODIUM LACTATE, POTASSIUM CHLORIDE, CALCIUM CHLORIDE 600; 310; 30; 20 MG/100ML; MG/100ML; MG/100ML; MG/100ML
1000 INJECTION, SOLUTION INTRAVENOUS CONTINUOUS
Status: DISCONTINUED | OUTPATIENT
Start: 2022-11-07 | End: 2022-11-07 | Stop reason: HOSPADM

## 2022-11-07 RX ORDER — LABETALOL HYDROCHLORIDE 5 MG/ML
5 INJECTION, SOLUTION INTRAVENOUS
Status: DISCONTINUED | OUTPATIENT
Start: 2022-11-07 | End: 2022-11-07 | Stop reason: HOSPADM

## 2022-11-07 RX ORDER — PROMETHAZINE HYDROCHLORIDE 25 MG/1
12.5 TABLET ORAL ONCE AS NEEDED
Status: DISCONTINUED | OUTPATIENT
Start: 2022-11-07 | End: 2022-11-07 | Stop reason: HOSPADM

## 2022-11-07 RX ORDER — FENTANYL CITRATE 50 UG/ML
25 INJECTION, SOLUTION INTRAMUSCULAR; INTRAVENOUS
Status: DISCONTINUED | OUTPATIENT
Start: 2022-11-07 | End: 2022-11-07 | Stop reason: HOSPADM

## 2022-11-07 RX ORDER — HYDROMORPHONE HYDROCHLORIDE 1 MG/ML
0.5 INJECTION, SOLUTION INTRAMUSCULAR; INTRAVENOUS; SUBCUTANEOUS
Status: DISCONTINUED | OUTPATIENT
Start: 2022-11-07 | End: 2022-11-07 | Stop reason: HOSPADM

## 2022-11-07 RX ORDER — LIDOCAINE HYDROCHLORIDE 20 MG/ML
INJECTION, SOLUTION EPIDURAL; INFILTRATION; INTRACAUDAL; PERINEURAL AS NEEDED
Status: DISCONTINUED | OUTPATIENT
Start: 2022-11-07 | End: 2022-11-07 | Stop reason: SURG

## 2022-11-07 RX ORDER — OXYCODONE AND ACETAMINOPHEN 10; 325 MG/1; MG/1
1 TABLET ORAL ONCE AS NEEDED
Status: DISCONTINUED | OUTPATIENT
Start: 2022-11-07 | End: 2022-11-07 | Stop reason: HOSPADM

## 2022-11-07 RX ADMIN — SODIUM CHLORIDE, POTASSIUM CHLORIDE, SODIUM LACTATE AND CALCIUM CHLORIDE 1000 ML: 600; 310; 30; 20 INJECTION, SOLUTION INTRAVENOUS at 06:09

## 2022-11-07 RX ADMIN — PROPOFOL 75 MCG/KG/MIN: 10 INJECTION, EMULSION INTRAVENOUS at 07:11

## 2022-11-07 RX ADMIN — Medication 2 G: at 07:13

## 2022-11-07 RX ADMIN — LIDOCAINE HYDROCHLORIDE 40 MG: 20 INJECTION, SOLUTION EPIDURAL; INFILTRATION; INTRACAUDAL; PERINEURAL at 07:10

## 2022-11-07 RX ADMIN — PROPOFOL 50 MG: 10 INJECTION, EMULSION INTRAVENOUS at 07:10

## 2022-11-07 NOTE — ANESTHESIA POSTPROCEDURE EVALUATION
"Chief Complaint   Patient presents with     Pharyngitis       Initial /67 (BP Location: Right arm, Patient Position: Chair, Cuff Size: Child)  Pulse 117  Temp 99.1  F (37.3  C) (Temporal)  Ht 3' 9\" (1.143 m)  Wt 41 lb 1.6 oz (18.6 kg)  SpO2 96%  BMI 14.27 kg/m2 Estimated body mass index is 14.27 kg/(m^2) as calculated from the following:    Height as of this encounter: 3' 9\" (1.143 m).    Weight as of this encounter: 41 lb 1.6 oz (18.6 kg).  Medication Reconciliation: complete     Joelle Blue MA      " Patient: Paul ZAMORANO    Procedure Summary     Date: 11/07/22 Room / Location:  [facility] OR 06 /  [facility] OR    Anesthesia Start: 0706 Anesthesia Stop: 0757    Procedure: INSERTION VENOUS ACCESS DEVICE (Chin to Nipples) Diagnosis:       Adenocarcinoma of rectosigmoid junction (HCC)      (Adenocarcinoma of rectosigmoid junction (HCC) [C19])    Surgeons: Scarlet Fishman MD Provider: Tri Ace CRNA    Anesthesia Type: MAC ASA Status: 3          Anesthesia Type: MAC    Vitals  Vitals Value Taken Time   /75 11/07/22 0831   Temp 96.4 °F (35.8 °C) 11/07/22 0755   Pulse 60 11/07/22 0831   Resp 18 11/07/22 0830   SpO2 100 % 11/07/22 0831   Vitals shown include unvalidated device data.        Post Anesthesia Care and Evaluation    Patient location during evaluation: PACU  Patient participation: complete - patient participated  Level of consciousness: awake and alert  Pain management: adequate    Airway patency: patent  Anesthetic complications: No anesthetic complications    Cardiovascular status: acceptable  Respiratory status: acceptable  Hydration status: acceptable    Comments: Blood pressure 156/75, pulse 62, temperature 96.4 °F (35.8 °C), resp. rate 18, SpO2 100 %.    Pt discharged from PACU based on elise score >8

## 2022-11-07 NOTE — ANESTHESIA PREPROCEDURE EVALUATION
Anesthesia Evaluation     Patient summary reviewed   no history of anesthetic complications:  NPO Solid Status: > 8 hours             Airway   Mallampati: II  Dental    (+) poor dentition    Pulmonary    (+) a smoker,   (-) COPD, asthma, sleep apnea  Cardiovascular   Exercise tolerance: good (4-7 METS)    (+) hypertension,   (-) pacemaker, past MI, angina, cardiac stents      Neuro/Psych  (+) neuromuscular disease (parkinsons),    (-) seizures, TIA, CVA  GI/Hepatic/Renal/Endo    (-) GERD, liver disease, no renal disease, diabetes    Musculoskeletal     Abdominal    Substance History      OB/GYN          Other      history of cancer                      Anesthesia Plan    ASA 3     MAC     intravenous induction     Anesthetic plan, risks, benefits, and alternatives have been provided, discussed and informed consent has been obtained with: patient.        CODE STATUS:

## 2022-11-07 NOTE — OP NOTE
INSERTION VENOUS ACCESS DEVICE  Procedure Note    Paul ZAMORANO  11/7/2022    Pre-op Diagnosis:   Adenocarcinoma of rectosigmoid junction (HCC) [C19]    Post-op Diagnosis:     same    Procedure/CPT® Codes:      Procedure(s):  INSERTION VENOUS ACCESS DEVICE    Surgeon(s):  Scarlet Fishman MD    Anesthesia: Monitored Anesthesia Care    Staff:   Circulator: Kacy Maldonado RN  Scrub Person: Luann Stacy; Dontrell Villareal    Estimated Blood Loss: minimal    Specimens: none                     Access site: Left subclavian vein        Complications: none          Date: 11/7/2022  Time: 07:52 CST  The patient was brought to the operating room and placed in the supine position. After IV sedation and infusion of IV antibiotics, the patient was prepped and draped in the usual sterile fashion. Lidocaine 1% was used for local anesthesia. The vein was accessed, the wire passed. Fluoroscopy revealed it to be in good position. The pocket was incised, developed. The catheter was tunneled, cut to size, secured to the port, and the port sutured in place with 0 Prolene. Sheath passed, catheter was fed. Fluoroscopy revealed it to be in good position. Good flush and flow obtained. The wound was closed with 3-0 and 4-0 Vicryl sutures. Dressing was placed. The patient was awakened and transferred to the recovery room in stable condition, tolerated the procedure well. At the end of the procedure, all counts were correct.       Scarlet Fishman MD

## 2022-11-21 ENCOUNTER — TELEPHONE (OUTPATIENT)
Dept: OTOLARYNGOLOGY | Facility: CLINIC | Age: 72
End: 2022-11-21

## 2022-11-21 ENCOUNTER — LAB (OUTPATIENT)
Dept: LAB | Facility: HOSPITAL | Age: 72
End: 2022-11-21

## 2022-11-21 ENCOUNTER — OFFICE VISIT (OUTPATIENT)
Dept: OTOLARYNGOLOGY | Facility: CLINIC | Age: 72
End: 2022-11-21

## 2022-11-21 VITALS
WEIGHT: 125 LBS | TEMPERATURE: 98 F | HEIGHT: 69 IN | SYSTOLIC BLOOD PRESSURE: 144 MMHG | BODY MASS INDEX: 18.51 KG/M2 | DIASTOLIC BLOOD PRESSURE: 88 MMHG | RESPIRATION RATE: 16 BRPM | HEART RATE: 84 BPM

## 2022-11-21 DIAGNOSIS — E04.2 MULTIPLE THYROID NODULES: Primary | ICD-10-CM

## 2022-11-21 DIAGNOSIS — E04.2 MULTIPLE THYROID NODULES: ICD-10-CM

## 2022-11-21 DIAGNOSIS — C19 ADENOCARCINOMA OF RECTOSIGMOID JUNCTION: ICD-10-CM

## 2022-11-21 LAB
ALBUMIN SERPL-MCNC: 4.5 G/DL (ref 3.5–5.2)
ALBUMIN/GLOB SERPL: 1.3 G/DL
ALP SERPL-CCNC: 76 U/L (ref 39–117)
ALT SERPL W P-5'-P-CCNC: <5 U/L (ref 1–41)
ANION GAP SERPL CALCULATED.3IONS-SCNC: 9 MMOL/L (ref 5–15)
AST SERPL-CCNC: 15 U/L (ref 1–40)
BASOPHILS # BLD AUTO: 0.02 10*3/MM3 (ref 0–0.2)
BASOPHILS NFR BLD AUTO: 0.4 % (ref 0–1.5)
BILIRUB SERPL-MCNC: 0.5 MG/DL (ref 0–1.2)
BUN SERPL-MCNC: 16 MG/DL (ref 8–23)
BUN/CREAT SERPL: 17.6 (ref 7–25)
CALCIUM SPEC-SCNC: 10.1 MG/DL (ref 8.6–10.5)
CEA SERPL-MCNC: 1.53 NG/ML
CHLORIDE SERPL-SCNC: 105 MMOL/L (ref 98–107)
CO2 SERPL-SCNC: 27 MMOL/L (ref 22–29)
CREAT SERPL-MCNC: 0.91 MG/DL (ref 0.76–1.27)
DEPRECATED RDW RBC AUTO: 45.1 FL (ref 37–54)
EGFRCR SERPLBLD CKD-EPI 2021: 90.7 ML/MIN/1.73
EOSINOPHIL # BLD AUTO: 0.05 10*3/MM3 (ref 0–0.4)
EOSINOPHIL NFR BLD AUTO: 0.9 % (ref 0.3–6.2)
ERYTHROCYTE [DISTWIDTH] IN BLOOD BY AUTOMATED COUNT: 14.5 % (ref 12.3–15.4)
FERRITIN SERPL-MCNC: 161.1 NG/ML (ref 30–400)
GLOBULIN UR ELPH-MCNC: 3.4 GM/DL
GLUCOSE SERPL-MCNC: 96 MG/DL (ref 65–99)
HCT VFR BLD AUTO: 36.1 % (ref 37.5–51)
HGB BLD-MCNC: 11 G/DL (ref 13–17.7)
IMM GRANULOCYTES # BLD AUTO: 0.01 10*3/MM3 (ref 0–0.05)
IMM GRANULOCYTES NFR BLD AUTO: 0.2 % (ref 0–0.5)
IRON 24H UR-MRATE: 39 MCG/DL (ref 59–158)
IRON SATN MFR SERPL: 11 % (ref 20–50)
LYMPHOCYTES # BLD AUTO: 2.32 10*3/MM3 (ref 0.7–3.1)
LYMPHOCYTES NFR BLD AUTO: 40.8 % (ref 19.6–45.3)
MCH RBC QN AUTO: 26 PG (ref 26.6–33)
MCHC RBC AUTO-ENTMCNC: 30.5 G/DL (ref 31.5–35.7)
MCV RBC AUTO: 85.3 FL (ref 79–97)
MONOCYTES # BLD AUTO: 0.44 10*3/MM3 (ref 0.1–0.9)
MONOCYTES NFR BLD AUTO: 7.7 % (ref 5–12)
NEUTROPHILS NFR BLD AUTO: 2.85 10*3/MM3 (ref 1.7–7)
NEUTROPHILS NFR BLD AUTO: 50 % (ref 42.7–76)
NRBC BLD AUTO-RTO: 0 /100 WBC (ref 0–0.2)
PLATELET # BLD AUTO: 243 10*3/MM3 (ref 140–450)
PMV BLD AUTO: 9.3 FL (ref 6–12)
POTASSIUM SERPL-SCNC: 3.9 MMOL/L (ref 3.5–5.2)
PROT SERPL-MCNC: 7.9 G/DL (ref 6–8.5)
RBC # BLD AUTO: 4.23 10*6/MM3 (ref 4.14–5.8)
SODIUM SERPL-SCNC: 141 MMOL/L (ref 136–145)
T4 FREE SERPL-MCNC: 1.38 NG/DL (ref 0.93–1.7)
TIBC SERPL-MCNC: 343 MCG/DL (ref 298–536)
TRANSFERRIN SERPL-MCNC: 230 MG/DL (ref 200–360)
TSH SERPL DL<=0.05 MIU/L-ACNC: 0.18 UIU/ML (ref 0.27–4.2)
WBC NRBC COR # BLD: 5.69 10*3/MM3 (ref 3.4–10.8)

## 2022-11-21 PROCEDURE — 83540 ASSAY OF IRON: CPT

## 2022-11-21 PROCEDURE — 84432 ASSAY OF THYROGLOBULIN: CPT

## 2022-11-21 PROCEDURE — 82728 ASSAY OF FERRITIN: CPT

## 2022-11-21 PROCEDURE — 80053 COMPREHEN METABOLIC PANEL: CPT

## 2022-11-21 PROCEDURE — 84466 ASSAY OF TRANSFERRIN: CPT

## 2022-11-21 PROCEDURE — 82378 CARCINOEMBRYONIC ANTIGEN: CPT

## 2022-11-21 PROCEDURE — 84439 ASSAY OF FREE THYROXINE: CPT

## 2022-11-21 PROCEDURE — 84443 ASSAY THYROID STIM HORMONE: CPT

## 2022-11-21 PROCEDURE — 99213 OFFICE O/P EST LOW 20 MIN: CPT | Performed by: NURSE PRACTITIONER

## 2022-11-21 PROCEDURE — 36415 COLL VENOUS BLD VENIPUNCTURE: CPT

## 2022-11-21 PROCEDURE — 85025 COMPLETE CBC W/AUTO DIFF WBC: CPT

## 2022-11-21 NOTE — PATIENT INSTRUCTIONS
Discussed FNA pending radiologist  Thyroid ultrasound in 6 months  TSH, thyroglobulin, and T4 today    CONTACT INFORMATION:  The main office phone number is [phone]. For emergencies after hours and on weekends, this number will convert over to our answering service and the on call provider will answer. Please try to keep non emergent phone calls/ questions to office hours 9am-5pm Monday through Friday.      [employer]  As an alternative, you can sign up and use the Epic MyChart system for more direct and quicker access for non emergent questions/ problems.  [employer] allows you to send messages to your doctor, view your test results, renew your prescriptions, schedule appointments, and more. To sign up, go to [employer] and click on the Sign Up Now link in the New User? box. Enter your [employer] Activation Code exactly as it appears below along with the last four digits of your Social Security Number and your Date of Birth () to complete the sign-up process. If you do not sign up before the expiration date, you must request a new code.     [employer] Activation Code: Activation code not generated  Current [employer] Status: Active     If you have questions, you can email [email or web address] or call [phone] to talk to our [employer] staff. Remember, [employer] is NOT to be used for urgent needs. For medical emergencies, dial 911.     IF YOU SMOKE OR USE TOBACCO PLEASE READ THE FOLLOWING:  Why is smoking bad for me?  Smoking increases the risk of heart disease, lung disease, vascular disease, stroke, and cancer. If you smoke, STOP!        IF YOU SMOKE OR USE TOBACCO PLEASE READ THE FOLLOWING:  Why is smoking bad for me?  Smoking increases the risk of heart disease, lung disease, vascular disease, stroke, and cancer. If you smoke, STOP!     For more information:  Quit Now [place]  -QUIT-NOW  [email or web address]

## 2022-11-21 NOTE — PROGRESS NOTES
YOB: 1950  Location: [facility]  Location Address: [address] [place] [address], [facility], [address] [place] [address]  Location Phone: [phone]    Chief Complaint   Patient presents with   • Thyroid Problem       History of Present Illness  Paul ZAMORANO is a 72 y.o. male.  Paul ZAMORANO is here for evaluation of ENT complaints. The patient has had problems with thyroid nodules.  The symptoms are localized to the bilateral thyroid. The patient has had no obvious clinical symptoms symptoms.    He states that he had a CT scan performed and they coincidentally found thyroid nodules. He declines hoarseness, dysphagia, or globus sensation.  Past Medical History:   Diagnosis Date   • Cancer (HCC)    • Colonic mass     pt states biopsy during colonoscopy was negative for cancer   • Dysuria    • Enlarged prostate    • Hypertension    • Parkinson's disease (HCC)    • Urgency of urination        Past Surgical History:   Procedure Laterality Date   • COLON RESECTION N/A 2022    Procedure: COLON RESECTION LAPAROSCOPIC SIGMOID OR LOW ANTERIOR;  Surgeon: Scarlet Fishman MD;  Location: [facility] OR;  Service: General;  Laterality: N/A;   • COLONOSCOPY N/A 2022    Procedure: COLONOSCOPY WITH ANESTHESIA;  Surgeon: Emil Moody MD;  Location: [facility] ENDOSCOPY;  Service: Gastroenterology;  Laterality: N/A;  pre anemia, LLQ pain  post  phong gaudy aprn   • CYSTOSCOPY     • CYSTOSCOPY TRANSURETHRAL RESECTION OF PROSTATE N/A 3/28/2017    Procedure: CYSTOSCOPY TRANSURETHRAL RESECTION OF PROSTATE;  Surgeon: Salbador Aguilar MD;  Location: [facility] OR;  Service:    • ENDOSCOPY N/A 2022    Procedure: ESOPHAGOGASTRODUODENOSCOPY WITH ANESTHESIA;  Surgeon: Emil Moody MD;  Location: [facility] ENDOSCOPY;  Service: Gastroenterology;  Laterality: N/A;  pre anemia, LLQ pain  post gastritis  phong gaudy aprn   • HEMORRHOIDECTOMY      AS TEENAGER   • VENOUS ACCESS DEVICE (PORT) INSERTION N/A 2022     Procedure: INSERTION VENOUS ACCESS DEVICE;  Surgeon: Scarlet Fishman MD;  Location: [facility];  Service: General;  Laterality: N/A;       Outpatient Medications Marked as Taking for the 11/21/22 encounter (Office Visit) with Eder Powell APRN   Medication Sig Dispense Refill   • amLODIPine (NORVASC) 5 MG tablet Take 5 mg by mouth Daily.     • carbidopa-levodopa (SINEMET)  MG per tablet Take 1 tablet by mouth 3 (Three) Times a Day for 90 days. 90 tablet 2       Patient has no known allergies.    Family History   Problem Relation Age of Onset   • No Known Problems Father    • No Known Problems Mother    • Colon cancer Neg Hx    • Colon polyps Neg Hx        Social History     Socioeconomic History   • Marital status: Legally    Tobacco Use   • Smoking status: Every Day     Packs/day: 0.50     Years: 20.00     Pack years: 10.00     Types: Cigarettes   • Smokeless tobacco: Never   Vaping Use   • Vaping Use: Never used   Substance and Sexual Activity   • Alcohol use: Yes     Alcohol/week: 2.0 standard drinks     Types: 2 Cans of beer per week     Comment: occ   • Drug use: No   • Sexual activity: Defer       Review of Systems   Constitutional: Negative.    HENT: Negative.    Eyes: Negative.    Respiratory: Negative.    Cardiovascular: Negative.    Gastrointestinal: Negative.    Genitourinary: Negative.    Musculoskeletal: Negative.    Neurological: Negative.    Hematological: Negative.    Psychiatric/Behavioral: Negative.        Vitals:    11/21/22 0930   BP: 144/88   Pulse: 84   Resp: 16   Temp: 98 °F (36.7 °C)       Body mass index is 18.46 kg/m².    Objective     Physical Exam  Vitals reviewed.   Constitutional:       Appearance: Normal appearance. He is normal weight.   HENT:      Head: Normocephalic and atraumatic.      Right Ear: Hearing, tympanic membrane, ear canal and external ear normal.      Left Ear: Hearing, tympanic membrane, ear canal and external ear normal.      Nose: Nose normal.       Mouth/Throat:      Lips: Pink.      Mouth: Mucous membranes are moist.      Pharynx: Oropharynx is clear. Uvula midline.   Neck:      Comments: Palpable nodules on bilateral thyroid  Musculoskeletal:      Cervical back: Full passive range of motion without pain.   Neurological:      Mental Status: He is alert.   Psychiatric:         Behavior: Behavior is cooperative.         Assessment & Plan   Diagnoses and all orders for this visit:    1. Multiple thyroid nodules (Primary)  -     US Thyroid  -     US Thyroid; Future  -     TSH; Future  -     T4, Free; Future  -     Thyroglobulin; Future      * Surgery not found *  Orders Placed This Encounter   Procedures   • US Thyroid     Order Specific Question:   Reason for Exam:     Answer:   Thyroid disease   • US Thyroid     Standing Status:   Future     Standing Expiration Date:   11/21/2023     Order Specific Question:   Reason for Exam:     Answer:   Thyroid disease   • TSH     Standing Status:   Future     Standing Expiration Date:   11/21/2023     Order Specific Question:   Release to patient     Answer:   Routine Release   • T4, Free     Standing Status:   Future     Standing Expiration Date:   11/21/2023     Order Specific Question:   Release to patient     Answer:   Routine Release   • Thyroglobulin     Standing Status:   Future     Standing Expiration Date:   11/21/2023     Order Specific Question:   Release to patient     Answer:   Routine Release     Discussed FNA pending radiologist  Thyroid ultrasound in 6 months  TSH, thyroglobulin, and T4 today    Return in about 6 months (around 5/21/2023) for with ultrasound, Recheck.       Patient Instructions   Discussed FNA pending radiologist  Thyroid ultrasound in 6 months  TSH, thyroglobulin, and T4 today    CONTACT INFORMATION:  The main office phone number is [phone]. For emergencies after hours and on weekends, this number will convert over to our answering service and the on call provider will answer. Please  try to keep non emergent phone calls/ questions to office hours 9am-5pm Monday through Friday.      [employer]  As an alternative, you can sign up and use the Epic MyChart system for more direct and quicker access for non emergent questions/ problems.  [employer] allows you to send messages to your doctor, view your test results, renew your prescriptions, schedule appointments, and more. To sign up, go to [employer] and click on the Sign Up Now link in the New User? box. Enter your [employer] Activation Code exactly as it appears below along with the last four digits of your Social Security Number and your Date of Birth () to complete the sign-up process. If you do not sign up before the expiration date, you must request a new code.     [employer] Activation Code: Activation code not generated  Current [employer] Status: Active     If you have questions, you can email [email or web address] or call [phone] to talk to our [employer] staff. Remember, [employer] is NOT to be used for urgent needs. For medical emergencies, dial 911.     IF YOU SMOKE OR USE TOBACCO PLEASE READ THE FOLLOWING:  Why is smoking bad for me?  Smoking increases the risk of heart disease, lung disease, vascular disease, stroke, and cancer. If you smoke, STOP!        IF YOU SMOKE OR USE TOBACCO PLEASE READ THE FOLLOWING:  Why is smoking bad for me?  Smoking increases the risk of heart disease, lung disease, vascular disease, stroke, and cancer. If you smoke, STOP!     For more information:  [employer]uit Now [place]  1800-QUIT-NOW  [email or web address]

## 2022-11-21 NOTE — TELEPHONE ENCOUNTER
----- Message from LOVE Ramirez sent at 11/21/2022  4:19 PM CST -----  FNA is recommend. This is the biopsy that we discussed earlier today. They will call to set this up

## 2022-11-23 ENCOUNTER — TELEPHONE (OUTPATIENT)
Dept: OTOLARYNGOLOGY | Facility: CLINIC | Age: 72
End: 2022-11-23

## 2022-11-23 NOTE — TELEPHONE ENCOUNTER
----- Message from LOVE Ramirez sent at 11/22/2022  8:51 AM CST -----  We will recheck TSH, T4 at next visit

## 2022-11-28 ENCOUNTER — TELEPHONE (OUTPATIENT)
Dept: GASTROENTEROLOGY | Facility: CLINIC | Age: 72
End: 2022-11-28

## 2022-11-28 NOTE — TELEPHONE ENCOUNTER
PT IS DUE FOR A 6 MONTH REPEAT COLONOSCOPY. HE HAD A SCOPE IN AUGUST 2022.  DOES HE NEED ANOTHER OV BEFORE YOU CAN SCHEDULE HIS SCOPE?

## 2022-11-29 LAB — THYROGLOB SERPL-MCNC: 41 NG/ML

## 2022-11-30 ENCOUNTER — TELEPHONE (OUTPATIENT)
Dept: ONCOLOGY | Facility: CLINIC | Age: 72
End: 2022-11-30

## 2022-11-30 ENCOUNTER — APPOINTMENT (OUTPATIENT)
Dept: ULTRASOUND IMAGING | Facility: HOSPITAL | Age: 72
End: 2022-11-30

## 2022-12-02 RX ORDER — FAMOTIDINE 10 MG/ML
20 INJECTION, SOLUTION INTRAVENOUS ONCE
Status: CANCELLED
Start: 2022-12-12 | End: 2022-12-12

## 2022-12-05 ENCOUNTER — HOSPITAL ENCOUNTER (OUTPATIENT)
Dept: ULTRASOUND IMAGING | Facility: HOSPITAL | Age: 72
Discharge: HOME | End: 2022-12-05
Admitting: NURSE PRACTITIONER

## 2022-12-05 DIAGNOSIS — E04.2 MULTIPLE THYROID NODULES: ICD-10-CM

## 2022-12-05 PROCEDURE — 88112 CYTOPATH CELL ENHANCE TECH: CPT | Performed by: NURSE PRACTITIONER

## 2022-12-05 PROCEDURE — 76942 ECHO GUIDE FOR BIOPSY: CPT

## 2022-12-05 PROCEDURE — 88172 CYTP DX EVAL FNA 1ST EA SITE: CPT | Performed by: NURSE PRACTITIONER

## 2022-12-05 PROCEDURE — 88177 CYTP FNA EVAL EA ADDL: CPT | Performed by: NURSE PRACTITIONER

## 2022-12-05 RX ORDER — LIDOCAINE HYDROCHLORIDE 10 MG/ML
5 INJECTION, SOLUTION INFILTRATION; PERINEURAL ONCE
Status: DISPENSED | OUTPATIENT
Start: 2022-12-05

## 2022-12-06 NOTE — PROGRESS NOTES
[facility] ONC [facility] HEMATOLOGY & ONCOLOGY [place]  [address]  [facility] [address] [phone]    Patient Name: Paul MERCADO  Encounter Date: 12/07/2022  YOB: 1950  Patient Number: [id number]      REASON FOR VISIT: Paul Mercado is a 72-year-old male who returns in follow-up of stage IIB (pT4a,pN0,M0,G1) well differentiated sigmoid adenocarcinoma.  On 09/09/2022- laparoscopic low anterior colon resection.  He anticipates adjuvant FOLFOX.  He is here alone.    I have reviewed the HPI and verified with the patient the accuracy of it. No changes to interval history since the information was documented. Freddie Layne MD 12/14/22     Diagnostic abnormalities:  --Medical history includes enlarged prostate, hypertension, recent weight loss, constipation and blood in his stool.  --08/18/2022-EGD-normal esophagus.  Minimal gastritis.  Normal examined duodenum.  Biopsies for H. pylori: Negative.  --08/18/2022-colonoscopy- likely malignant partially obstructing tumor in the rectosigmoid colon at 14 cm.  Scope could not be advanced past this lesion due to obstruction from the mass.  Biopsy of colon mass, rectal/sigmoid--superficial fragments of tubulovillous adenoma negative for high-grade dysplasia.  Comment: If the biopsy material is part of a larger lesion, the above diagnosis may not be representative.  --08/23/2022-seen for general surgery by Dr. Fishman-colonoscopy findings discussed with Dr. Moody who did not feel that the lesion was in the rectum and would not be amenable to preoperative radiation therapy even if the biopsy were cancerous.  Plan: Proceed with laparoscopic exploration with sigmoid colectomy with primary anastomosis versus temporary proximal colostomy.  --09/26/2022- CMP normal. Hgb 11.1; MCV 85, platelets 350,000, WBC 8.18.  --09/26/2022-CT abdomen/pelvis-1. Moderate constipation with increased stool throughout  the colon including fecal impaction within the rectal vault. The patient's undergone previous sigmoid resection with anastomotic suture line within the central pelvis. I do not see evidence of complication. 2. The soft tissue organs within the upper abdomen are unremarkable. There is no evidence of nephrolithiasis or obstructive uropathy. No evidence of retroperitoneal adenopathy. 3. The prostate gland is heterogeneous and nodular in appearance with mass effect on the base of the bladder.  -- 10/03/2022- CMP normal.  B12 316, folate 8.6, CEA 1.71, ferritin 199, iron 61, iron saturation 19%, hemoglobin 11.2, MCV 84.5, platelets 297,000, WBC 2.25.  -- 10/17/2022-CT chest- no metastatic disease in the chest.  Paraseptal/centrilobular emphysema.  Enlarged thyroid lobes bilaterally with multiple nodules.    Previous interventions:  -- 09/09/2022- laparoscopic low anterior colon resection.  Final diagnosis:  Rectosigmoid colon, resection: Well differentiated adenocarcinoma arising in a tubular adenoma. Surgical resection margins free of tumor. Tumor focally extends to serosal surface. 17 pericolonic lymph nodes, negative for metastatic carcinoma (0/17).  AJCC pathologic stage:  pT4a N0    Synoptic report:  Procedure  Low anterior resection    Macroscopic Evaluation of Mesorectum  Complete    TUMOR   Tumor Site  Rectosigmoid    Histologic Type  Adenocarcinoma    Histologic Grade  G1, well differentiated    Tumor Size  Greatest dimension (Centimeters): 2.4 cm   Tumor Extent  Invades visceral peritoneum    Macroscopic Tumor Perforation  Not identified    Lymphovascular Invasion  Not identified    Perineural Invasion  Not identified    Type of Polyp in which Invasive Carcinoma Arose  Tubular adenoma    Treatment Effect  No known presurgical therapy    MARGINS   Margin Status for Invasive Carcinoma  All margins negative for invasive carcinoma    Closest Margin(s) to Invasive Carcinoma  Radial (circumferential) or mesenteric     Distance from Invasive Carcinoma to Closest Margin  4.3 cm   Distance from Invasive Carcinoma to Radial (Circumferential) Margin  Distance already reported as closest margin    Margin Status for Non-Invasive Tumor  All margins negative for high-grade dysplasia / intramucosal carcinoma and low-grade dysplasia    REGIONAL LYMPH NODES   Regional Lymph Node Status  All regional lymph nodes negative for tumor    Number of Lymph Nodes Examined  17    Tumor Deposits  Not identified    PATHOLOGIC STAGE CLASSIFICATION (pTNM, AJCC 8th Edition)   Reporting of pT, pN, and (when applicable) pM categories is based on information available to the pathologist at the time the report is issued. As per the AJCC (Chapter 1, 8th Ed.) it is the managing physician's responsibility to establish the final pathologic stage based upon all pertinent information, including but potentially not limited to this pathology report.   pT Category  pT4a    pN Category  pN0    ADDITIONAL FINDINGS   Additional Findings  Adenoma(s)       --Molecular predictive markers: MLH1/MSH2/MSH6-intact (no loss of expression); MSI stable (PAVEL); KRAS mutation detected (exon 2); NRAS mutation-not detected  -- Anticipate adjuvant FOLFOX-C1, 11/28/2022      LABS    Lab Results - Last 18 Months   Lab Units 11/21/22  1042 10/31/22  1039 10/03/22  1218 09/26/22  1159 09/06/22  0610 09/05/22  0422 09/04/22  0508 09/03/22  0517   HEMOGLOBIN g/dL 11.0* 11.2* 11.2* 11.1* 10.3* 10.8*   < > 11.6*   HEMATOCRIT % 36.1* 36.7* 35.5* 35.1* 33.3* 36.5*   < > 36.9*   MCV fL 85.3 85.9 84.5 85.0 84.5 90.3   < > 84.4   WBC 10*3/mm3 5.69 5.27 6.25 8.18 4.79 5.97   < > 7.08   RDW % 14.5 15.4 15.2 15.3 13.9 14.6   < > 14.3   MPV fL 9.3 9.6 9.6 9.7 9.4 9.7   < > 9.9   PLATELETS 10*3/mm3 243 285 297 340 291 285   < > 332   IMM GRAN % % 0.2  --  0.3 0.4  --   --   --  0.4   NEUTROS ABS 10*3/mm3 2.85  --  3.27 6.25  --   --   --  4.48   LYMPHS ABS 10*3/mm3 2.32  --  2.53 1.48  --   --   --   1.99   MONOS ABS 10*3/mm3 0.44  --  0.32 0.36  --   --   --  0.46   EOS ABS 10*3/mm3 0.05  --  0.08 0.03  --   --   --  0.11   BASOS ABS 10*3/mm3 0.02  --  0.03 0.03  --   --   --  0.01   IMMATURE GRANS (ABS) 10*3/mm3 0.01  --  0.02 0.03  --   --   --  0.03   NRBC /100 WBC 0.0  --  0.0 0.0  --   --   --  0.0    < > = values in this interval not displayed.       Lab Results - Last 18 Months   Lab Units 11/21/22  1042 10/31/22  1039 10/17/22  1517 10/03/22  1218 09/26/22  1159 09/06/22  0610 09/05/22  0713 09/03/22  0517 08/31/22  0928   GLUCOSE mg/dL 96 98  --  101* 98 115* 95   < > 102*   SODIUM mmol/L 141 142  --  140 138 140 141   < > 139   POTASSIUM mmol/L 3.9 3.9  --  3.7 4.2 3.8 4.0   < > 4.1   CO2 mmol/L 27.0 30.0*  --  28.0 24.0 30.0* 28.0   < > 28.0   CHLORIDE mmol/L 105 104  --  105 102 107 107   < > 103   ANION GAP mmol/L 9.0 8.0  --  7.0 12.0 3.0* 6.0   < > 8.0   CREATININE mg/dL 0.91 0.95 1.00 0.93 1.01 0.94 1.01   < > 1.16   BUN mg/dL 16 9  --  10 8 5* 4*   < > 13   BUN / CREAT RATIO  17.6 9.5  --  10.8 7.9 5.3* 4.0*   < > 11.2   CALCIUM mg/dL 10.1 10.2  --  9.9 9.7 9.6 9.4   < > 10.1   ALK PHOS U/L 76 84  --  67 71  --   --   --  77   TOTAL PROTEIN g/dL 7.9 8.2  --  7.8 7.4  --   --   --  8.2   ALT (SGPT) U/L <5 12  --  <5 11  --   --   --  7   AST (SGOT) U/L 15 17  --  18 17  --   --   --  15   BILIRUBIN mg/dL 0.5 0.6  --  0.6 0.6  --   --   --  0.5   ALBUMIN g/dL 4.50 4.60  --  4.80 4.50  --   --   --  4.70   GLOBULIN gm/dL 3.4 3.6  --  3.0 2.9  --   --   --  3.5    < > = values in this interval not displayed.       Lab Results - Last 18 Months   Lab Units 11/21/22  1042 10/03/22  1218   CEA ng/mL 1.53 1.71       Lab Results - Last 18 Months   Lab Units 11/21/22  1042 10/03/22  1218   IRON mcg/dL 39* 61   TIBC mcg/dL 343 325   IRON SATURATION % 11* 19*   FERRITIN ng/mL 161.10 199.60   TSH uIU/mL 0.184*  --    FOLATE ng/mL  --  8.60         PAST MEDICAL HISTORY:  ALLERGIES:  No Known  Allergies  CURRENT MEDICATIONS:  Outpatient Encounter Medications as of 12/7/2022   Medication Sig Dispense Refill   • amLODIPine (NORVASC) 5 MG tablet Take 5 mg by mouth Daily.     • HYDROcodone-acetaminophen (NORCO) 7.5-325 MG per tablet Take 1 tablet by mouth Every 4 (Four) Hours As Needed for Moderate Pain (Pain). 15 tablet 0   • ondansetron (ZOFRAN) 8 MG tablet Take 1 tablet by mouth Every 8 (Eight) Hours As Needed for Nausea or Vomiting. 30 tablet 0   • carbidopa-levodopa (SINEMET)  MG per tablet Take 1 tablet by mouth 3 (Three) Times a Day for 90 days. 90 tablet 2   • lidocaine-prilocaine (EMLA) 2.5-2.5 % cream Apply to port site 30 minutes before it is accessed. Cover with occlusive dressing. 30 g 2   • [DISCONTINUED] polyethylene glycol (MIRALAX) 17 g packet Mix 17 grams (1 packet) daily as needed- hold for diarrhea 30 packet 1     Facility-Administered Encounter Medications as of 12/7/2022   Medication Dose Route Frequency Provider Last Rate Last Admin   • lidocaine (XYLOCAINE) 1 % injection 5 mL  5 mL Infiltration Once Eder Powell, APRN       • sodium bicarbonate injection 0.5 mEq  1 mL Injection Once Eder Powell, APRGET         Adult illnesses:  Enlarged prostate  Hypertension  Anemia  Chronic constipation  Malnutrition  Emphysema  Multiple thyroid nodules    Past surgeries:  Cystoscopy with TURP, 03/28/2017  EGD and colonoscopy, 08/18/2022  Hemorrhoidectomy as a teenager  Left subclavian [facility], 11/07/2022- Dr. Fishman    ADULT ILLNESSES:  Patient Active Problem List   Diagnosis Code   • BPH with urinary obstruction N40.1, N13.8   • Heme positive stool R19.5   • Anemia D64.9   • Chronic constipation K59.09   • LLQ pain R10.32   • Benign neoplasm of sigmoid colon D12.5   • Severe malnutrition (HCC) E43   • Adenocarcinoma of rectosigmoid junction (HCC) C19     SURGERIES:  Past Surgical History:   Procedure Laterality Date   • COLON RESECTION N/A 9/2/2022    Procedure: COLON RESECTION  LAPAROSCOPIC SIGMOID OR LOW ANTERIOR;  Surgeon: Scarlet Fishman MD;  Location: [facility] OR;  Service: General;  Laterality: N/A;   • COLONOSCOPY N/A 8/18/2022    Procedure: COLONOSCOPY WITH ANESTHESIA;  Surgeon: Emil Moody MD;  Location:  [facility];  Service: Gastroenterology;  Laterality: N/A;  pre anemia, LLQ pain  post  phong gaudy aprn   • CYSTOSCOPY     • CYSTOSCOPY TRANSURETHRAL RESECTION OF PROSTATE N/A 3/28/2017    Procedure: CYSTOSCOPY TRANSURETHRAL RESECTION OF PROSTATE;  Surgeon: Salbador Aguilar MD;  Location: [facility] OR;  Service:    • ENDOSCOPY N/A 8/18/2022    Procedure: ESOPHAGOGASTRODUODENOSCOPY WITH ANESTHESIA;  Surgeon: Emil Moody MD;  Location: [facility] ENDOSCOPY;  Service: Gastroenterology;  Laterality: N/A;  pre anemia, LLQ pain  post gastritis  phong gaudy aprn   • HEMORRHOIDECTOMY      AS TEENAGER   • VENOUS ACCESS DEVICE (PORT) INSERTION N/A 11/7/2022    Procedure: INSERTION VENOUS ACCESS DEVICE;  Surgeon: Scarlet Fishman MD;  Location: [facility] OR;  Service: General;  Laterality: N/A;     HEALTH MAINTENANCE ITEMS:  Health Maintenance Due   Topic Date Due   • COVID-19 Vaccine (1) Never done   • Pneumococcal Vaccine 65+ (1 - PCV) Never done   • TDAP/TD VACCINES (1 - Tdap) Never done   • ZOSTER VACCINE (1 of 2) Never done   • HEPATITIS C SCREENING  Never done   • ANNUAL WELLNESS VISIT  Never done   • INFLUENZA VACCINE  Never done       <no information>  Last Completed Colonoscopy          COLONOSCOPY (Every 10 Years) Next due on 8/18/2032 08/18/2022  COLONOSCOPY    08/18/2022  Surgical Procedure: COLONOSCOPY                There is no immunization history on file for this patient.  Last Completed Mammogram     This patient has no relevant Health Maintenance data.            FAMILY HISTORY:  Family History   Problem Relation Age of Onset   • No Known Problems Father    • No Known Problems Mother    • Colon cancer Neg Hx    • Colon polyps Neg Hx      SOCIAL  "HISTORY:  Social History     Socioeconomic History   • Marital status: Legally    Tobacco Use   • Smoking status: Every Day     Packs/day: 0.50     Years: 20.00     Pack years: 10.00     Types: Cigarettes   • Smokeless tobacco: Never   Vaping Use   • Vaping Use: Never used   Substance and Sexual Activity   • Alcohol use: Yes     Alcohol/week: 2.0 standard drinks     Types: 2 Cans of beer per week     Comment: occ   • Drug use: No   • Sexual activity: Defer       REVIEW OF SYSTEMS:  Review of Systems   Constitutional: Positive for activity change and unexpected weight loss (10 pounds in the past 1 month). Negative for chills, fatigue and fever.        Manages his ADLs, to include some chores, errands and driving.  Is up and about at least 50%.   HENT: Negative.    Eyes: Negative.    Respiratory: Negative.  Negative for cough and shortness of breath.         Smoker since age 23-currently 1/2 pack/day   Cardiovascular: Negative.    Gastrointestinal: Positive for constipation (\"occasional\"). Negative for abdominal pain, blood in stool, diarrhea, nausea and vomiting.   Endocrine: Negative.    Genitourinary: Positive for decreased urine volume and nocturia.   Musculoskeletal: Negative.    Skin: Negative.    Neurological: Positive for tremors.   Hematological: Negative.    Psychiatric/Behavioral: Negative.      /92   Pulse 84   Temp 97.9 °F (36.6 °C) (Temporal)   Resp 18   Ht 175.3 cm (69.02\")   Wt 56.9 kg (125 lb 8 oz)   SpO2 97%   BMI 18.52 kg/m²  Body surface area is 1.69 meters squared.  Pain Score    12/07/22 0907   PainSc: 0-No pain       Physical Exam:  Physical Exam  Vitals reviewed.   Constitutional:       Comments: Pleasant, cooperative, slender, modestly kept elderly male.  Ambulatory.  ECOG 1-2 (same).      Has gained 3 lb since the last visit   HENT:      Head: Normocephalic and atraumatic.      Mouth/Throat:      Comments: Is wearing a surgical mask today.  Eyes:      General: No scleral " icterus.     Extraocular Movements: Extraocular movements intact.      Conjunctiva/sclera: Conjunctivae normal.      Pupils: Pupils are equal, round, and reactive to light.   Cardiovascular:      Rate and Rhythm: Normal rate.   Pulmonary:      Effort: Pulmonary effort is normal.      Comments: Port in the left upper chest is well-seated  Abdominal:      General: Abdomen is flat.      Palpations: Abdomen is soft.      Tenderness: There is no abdominal tenderness.      Comments: Healed midline paramedian incision   Musculoskeletal:         General: Normal range of motion.      Cervical back: Normal range of motion.   Lymphadenopathy:      Cervical: No cervical adenopathy.   Skin:     General: Skin is warm.   Neurological:      General: No focal deficit present.      Mental Status: He is alert and oriented to person, place, and time.      Comments: Resting tremor, head, right/left hands   Psychiatric:         Mood and Affect: Mood normal.         Thought Content: Thought content normal.         Assessment:  1.   Well differentiated sigmoid adenocarcinoma  --Original tumor stage:  IIB (pT4a,pN0,M0,G1)  --Original tumor burden: 2.4 cm invasive carcinoma sigmoid colon.  Invades visceral peritoneum.  Macroscopic tumor perforation, lymphovascular invasion, perineural invasion not identified.  Invasive carcinoma not present at radial (circumferential) or mesenteric margins.  All regional lymph nodes negative for tumor (0/17).  Pathologic stage classification (pTNM, AJCC eighth edition): [id number]  -- Molecular predictive markers: MLH1/MSH2/MSH6-intact (no loss of expression); MSI stable (PAVEL); KRAS mutation detected (exon 2); NRAS mutation-not detected  -- Complications of tumor: Near obstruction.  Weight loss.  Anemia.  --Tumor status:  --09/09/2022- low anterior sigmoid resection     2.   Normocytic anemia.    Evidence for anemia of malignancy/anemia of chronic disease  --Hgb  11; MCV 85.3, 11/21/2022 (prior: Hgb 9.3-11.9;  MCV 81.3-90.3)    3.   Resting tremor.  Parkinsons.  4.   Ongoing tobacco smoker.  5.   History of EtOH use.  6.   Multiple thyroid nodules.   -- 11/21/2022-seen for ENT by LOVE Roberson.  Plan: Return in 6 months (5/21/2023) with ultrasound  --12/05/2022- Biopsy- benign follicular nodule  7.   Emphysema     Plan:  1.     Apprised of baseline labs, 10/03/2022-11/21/2022.  Normal CMP.  Normal CEA.  Normal ferritin and serum iron 39 (L), iron saturation 11% (L) consistent with anemia of chronic disease, stable anemia otherwise normal CBC.    2.    Again apprised of the available diagnostic information.  Specifically discussed the pathologic findings demonstrating the T4a tumor with the near bowel obstruction defines a poor prognostic feature which would warrant adjuvant systemic therapy.  Note that the benefit of adjuvant chemotherapy does not improve survival by more than 5%.  3.    Apprised of tumor predictive markers: Intact MMR/PAVEL/KRAS+ exon2 mutation/negative NRAS.  4.   Apprised of CT chest, 10/17/2022 (above).  ILEANA.  Multiple thyroid nodules.  Emphysema.  5.     Note ENT consult, 11/21/2022 and thyroid biopsy, 12/05/2022 (above).  6.    Review NCCN guidelines 1.2022 colon cancer-pathologic stage: T4, N0, M0 at high risk for systemic recurrence (bowel obstruction, positive margins)-adjuvant treatment adjuvant treatment (capecitabine with capecitabine for 6 months, 5-FU/leucovorin 6 months or FOLFOX or observation.  Surveillance: Stage II--H&P every 3 to 6 months for 2 years then every 6 months for total of 5 years.  CEA every 3-6 months for 2 years and every 6 months for a total of 5 years.  Chest/abdomen/pelvic CT every 6-12 months (category 2B for frequency less than 12 months) from date of surgery for total 5 years.  Colonoscopy in 1 year after surgery except if no preoperative colonoscopy due to obstructing lesion, colonoscopy in 3-6 months.  PET/CT not indicated.    7.    Re: Discussed the  potential toxicities of FOLFOX (5-FU and oxaliplatin) chemotherapy (to include but not limited to: Myelosuppression (leukopenia, anemia, thrombocytopenia), agranulocytosis, hemorrhage, GI bleed, stomatitis, coronary vasospasm, myocardial ischemia, anaphylaxis, nausea, vomiting, diarrhea, anorexia, hand-foot syndrome, alopecia dermatitis, photosensitivity, skin ulcers, acute cerebellar syndrome, headache, confusion, ocular irritation, hyperlacrimation, nail discoloration; oxaliplatin: Hypersensitivity reactions, anaphylaxis, laryngospasm, thromboembolism, myelosuppression (pancytopenia), hemolytic uremic syndrome, pulmonary fibrosis, interstitial lung disease, hepatotoxicity, pancreatitis, ileus, GI bleed, nausea/vomiting, severe diarrhea, metabolic acidosis, acute renal failure, reversible posterior leukoencephalopathy syndrome, peripheral neuropathy, optic neuritis, deafness, seizures, stomatitis, liver enzyme abnormalities, anorexia, cough, constipation, belly pain, fever, infection, dyspnea, epistaxis, edema, taste changes, myalgias, hyperglycemia, headache, insomnia, dyspepsia, back pain, injection site reaction, weight loss, peripheral edema, pharyngolaryngeal dysesthesia). A long question and answer session was conducted. All his questions were answered to the best of my ability and to his apparent satisfaction. He agrees to a trial of therapy.     8. Schedule treatment -  C1, 12/12/2022; C2, 12/26/2026; C3, 01/09/2023; C4, 01/23/2023- at [facility]:      -  To be administered every 2 weeks x 12 cycles   BSA =   · D1:   · Oxaliplatin 85 mg/m2 IVPB over 2 hours (TD = mg).   · Leucovorin 400 mg/m2 IVPB over 2 hours (TD= mg).   · 5- mg/ m2 IVP (TD=  mg)  · Begin 5-FU 2400 mg/m2 IVPB over 46 hours. (TD= mg).     -. Premedicate with:   · Aloxi 0.25 mg IV   · Emend 150 mg IV   · Decadron 10 mg IV         9. Rx:   · Zofran 8 mg p.o. 3 times daily as needed #30 - eRx         10.  CMP and CBC with differential weekly on  Mondays with Procrit 40,000 units subcutaneously weekly if hemoglobin is less than 10 or HCT less than 30; Neupogen 480 mcg subcutaneously daily x3 if ANC less than 1.0 -[facility]  11.  Continue currently identified medications.   12.  Continue ongoing management per primary care physician and other specialists.   13. Return to the [place] office in 8 weeks with pre-office serum iron, Fe sat, ferritin, CMP, CBC with differential and CEA      I spent 52 minutes caring for Paul on this date of service. This time includes time spent by me in the following activities: preparing for the visit, reviewing tests, performing a medically appropriate examination and/or evaluation, counseling and educating the patient/family/caregiver, ordering medications, tests, or procedures and documenting information in the medical record.

## 2022-12-07 ENCOUNTER — OFFICE VISIT (OUTPATIENT)
Dept: ONCOLOGY | Facility: CLINIC | Age: 72
End: 2022-12-07

## 2022-12-07 VITALS
TEMPERATURE: 97.9 F | OXYGEN SATURATION: 97 % | HEART RATE: 84 BPM | DIASTOLIC BLOOD PRESSURE: 92 MMHG | SYSTOLIC BLOOD PRESSURE: 144 MMHG | HEIGHT: 69 IN | RESPIRATION RATE: 18 BRPM | WEIGHT: 125.5 LBS | BODY MASS INDEX: 18.59 KG/M2

## 2022-12-07 DIAGNOSIS — C19 ADENOCARCINOMA OF RECTOSIGMOID JUNCTION: Primary | ICD-10-CM

## 2022-12-07 PROCEDURE — 99215 OFFICE O/P EST HI 40 MIN: CPT | Performed by: INTERNAL MEDICINE

## 2022-12-07 RX ORDER — POLYETHYLENE GLYCOL 3350 17 G/17G
POWDER, FOR SOLUTION ORAL
Qty: 30 PACKET | Refills: 1 | Status: SHIPPED | OUTPATIENT
Start: 2022-12-07 | End: 2022-12-07 | Stop reason: SDUPTHER

## 2022-12-07 RX ORDER — POLYETHYLENE GLYCOL 3350 17 G/17G
17 POWDER, FOR SOLUTION ORAL DAILY
Qty: 238 G | Refills: 0 | Status: SHIPPED | OUTPATIENT
Start: 2022-12-07

## 2022-12-07 RX ORDER — LIDOCAINE AND PRILOCAINE 25; 25 MG/G; MG/G
CREAM TOPICAL
Qty: 30 G | Refills: 2 | Status: SHIPPED | OUTPATIENT
Start: 2022-12-07

## 2022-12-08 ENCOUNTER — TELEPHONE (OUTPATIENT)
Dept: OTOLARYNGOLOGY | Facility: CLINIC | Age: 72
End: 2022-12-08

## 2022-12-08 LAB
BEAKER LAB AP INTRAOPERATIVE CONSULTATION: NORMAL
CYTO UR: NORMAL
LAB AP CASE REPORT: NORMAL
LAB AP CLINICAL INFORMATION: NORMAL
LAB AP DIAGNOSIS COMMENT: NORMAL
Lab: NORMAL
PATH REPORT.FINAL DX SPEC: NORMAL
PATH REPORT.GROSS SPEC: NORMAL

## 2022-12-08 NOTE — TELEPHONE ENCOUNTER
----- Message from LOVE Ramirez sent at 12/8/2022 12:44 PM CST -----  FNA is benign. We will continue with appt in 6 months to repeat ultrasound.

## 2022-12-21 DIAGNOSIS — C19 ADENOCARCINOMA OF RECTOSIGMOID JUNCTION: Primary | ICD-10-CM

## 2022-12-21 RX ORDER — DIPHENHYDRAMINE HYDROCHLORIDE 50 MG/ML
50 INJECTION INTRAMUSCULAR; INTRAVENOUS AS NEEDED
OUTPATIENT
Start: 2023-01-25

## 2022-12-21 RX ORDER — PALONOSETRON 0.05 MG/ML
0.25 INJECTION, SOLUTION INTRAVENOUS ONCE
OUTPATIENT
Start: 2023-01-25

## 2022-12-21 RX ORDER — FAMOTIDINE 10 MG/ML
20 INJECTION, SOLUTION INTRAVENOUS AS NEEDED
OUTPATIENT
Start: 2023-01-25

## 2022-12-21 RX ORDER — DEXTROSE MONOHYDRATE 50 MG/ML
250 INJECTION, SOLUTION INTRAVENOUS ONCE
OUTPATIENT
Start: 2023-01-25

## 2022-12-21 RX ORDER — FLUOROURACIL 50 MG/ML
400 INJECTION, SOLUTION INTRAVENOUS ONCE
OUTPATIENT
Start: 2023-01-25

## 2023-03-07 ENCOUNTER — OFFICE VISIT (OUTPATIENT)
Dept: GASTROENTEROLOGY | Facility: CLINIC | Age: 73
End: 2023-03-07
Payer: MEDICARE

## 2023-03-07 VITALS
TEMPERATURE: 96.8 F | WEIGHT: 131 LBS | BODY MASS INDEX: 19.4 KG/M2 | HEIGHT: 69 IN | DIASTOLIC BLOOD PRESSURE: 82 MMHG | HEART RATE: 95 BPM | SYSTOLIC BLOOD PRESSURE: 158 MMHG | OXYGEN SATURATION: 98 %

## 2023-03-07 DIAGNOSIS — Z85.038 PERSONAL HISTORY OF COLON CANCER: Primary | ICD-10-CM

## 2023-03-07 DIAGNOSIS — Z80.0 FAMILY HX OF COLON CANCER: ICD-10-CM

## 2023-03-07 PROCEDURE — S0260 H&P FOR SURGERY: HCPCS | Performed by: NURSE PRACTITIONER

## 2023-03-07 NOTE — PROGRESS NOTES
[facility] Gastroenterology    Primary Physician Nicole Doss, APRN    3/7/2023    Paul ZAMORANO   1950      Chief Complaint   Patient presents with   • Colonoscopy   history colon cancer    Subjective     HPI    Paul ZAMORANO is a 72 y.o. male who presents as a referral for preventative maintenance. He has no complaints of nausea or vomiting. No change in bowels. No wt loss. No BRBPR. No melena. No abdominal pain.       He did not take chemotherapy. He wanted second opinion but never follow through.   COLONOSCOPY (08/18/2022 08:37)  - Preparation of the colon was fair.  - Likely malignant partially obstructing tumor in the recto-sigmoid colon at 14 cm. The scope could not be advanced past this lesion due to obstruction from the mass. Biopsied.  Recommendations   - Refer to a surgeon ([facility]) at the next available appointment.  - Return to GI office PRN.  - Low fiber diet.  - Daily calorie supplements  Path   Final Diagnosis   Colon mass, rectal/sigmoid, biopsies:  Superficial fragments of tubulovillous adenoma, negative for high-grade dysplasia.         Surgery by Dr. Fishman 9-2-22  Procedure(s):  COLON RESECTION LAPAROSCOPIC LOW ANTERIOR  Final Diagnosis  Rectosigmoid colon, resection:  Well differentiated adenocarcinoma arising in a tubular adenoma.  Surgical resection margins free of tumor.  Tumor focally extends to serosal surface.  17 pericolonic lymph nodes, negative for metastatic carcinoma (0/17).        Maternal uncle had colon cancer.       Past Medical History:   Diagnosis Date   • Cancer (HCC)    • Colonic mass     pt states biopsy during colonoscopy was negative for cancer   • Dysuria    • Enlarged prostate    • Hypertension    • Parkinson's disease (HCC)    • Urgency of urination        Past Surgical History:   Procedure Laterality Date   • COLON RESECTION N/A 9/2/2022    Procedure: COLON RESECTION LAPAROSCOPIC SIGMOID OR LOW ANTERIOR;  Surgeon: Scarlet Fishman  MD THADDEUS;  Location: [facility] OR;  Service: General;  Laterality: N/A;   • COLONOSCOPY N/A 8/18/2022    Procedure: COLONOSCOPY WITH ANESTHESIA;  Surgeon: Emil Moody MD;  Location: [facility] ENDOSCOPY;  Service: Gastroenterology;  Laterality: N/A;  pre anemia, LLQ pain  post  phong gaudy aprn   • CYSTOSCOPY     • CYSTOSCOPY TRANSURETHRAL RESECTION OF PROSTATE N/A 3/28/2017    Procedure: CYSTOSCOPY TRANSURETHRAL RESECTION OF PROSTATE;  Surgeon: Salbador Aguilar MD;  Location: [facility] OR;  Service:    • ENDOSCOPY N/A 8/18/2022    Procedure: ESOPHAGOGASTRODUODENOSCOPY WITH ANESTHESIA;  Surgeon: Emil Moody MD;  Location: [facility] ENDOSCOPY;  Service: Gastroenterology;  Laterality: N/A;  pre anemia, LLQ pain  post gastritis  phong gaudy aprn   • HEMORRHOIDECTOMY      AS TEENAGER   • VENOUS ACCESS DEVICE (PORT) INSERTION N/A 11/7/2022    Procedure: INSERTION VENOUS ACCESS DEVICE;  Surgeon: Scarlet Fishman MD;  Location: [facility] OR;  Service: General;  Laterality: N/A;       Outpatient Medications Marked as Taking for the 3/7/23 encounter (Office Visit) with Isi Davis APRN   Medication Sig Dispense Refill   • amLODIPine (NORVASC) 5 MG tablet Take 1 tablet by mouth Daily.     • carbidopa-levodopa (SINEMET)  MG per tablet Take 1 tablet by mouth 3 (Three) Times a Day.       Current Facility-Administered Medications for the 3/7/23 encounter (Office Visit) with Isi Davis APRN   Medication Dose Route Frequency Provider Last Rate Last Admin   • lidocaine (XYLOCAINE) 1 % injection 5 mL  5 mL Infiltration Once Eder Powell APRN       • sodium bicarbonate injection 0.5 mEq  1 mL Injection Once Eder Powell APRN           No Known Allergies    Social History     Socioeconomic History   • Marital status: Legally    Tobacco Use   • Smoking status: Every Day     Packs/day: 0.50     Years: 20.00     Pack years: 10.00     Types: Cigarettes   • Smokeless tobacco: Never   Vaping Use   • Vaping  Use: Never used   Substance and Sexual Activity   • Alcohol use: Yes     Alcohol/week: 2.0 standard drinks     Types: 2 Cans of beer per week     Comment: occ   • Drug use: No   • Sexual activity: Defer       Family History   Problem Relation Age of Onset   • No Known Problems Mother    • No Known Problems Father    • Colon cancer Maternal Uncle    • Colon polyps Neg Hx        Review of Systems   Constitutional: Negative for chills, fever and unexpected weight change.   Respiratory: Negative for shortness of breath.    Cardiovascular: Negative for chest pain.   Gastrointestinal: Negative for abdominal distention, abdominal pain, anal bleeding, blood in stool, constipation, diarrhea, nausea and vomiting.       Objective     Vitals:    03/07/23 0903   BP: 158/82   Pulse: 95   Temp: 96.8 °F (36 °C)   SpO2: 98%         03/07/23 0903   Weight: 59.4 kg (131 lb)     Body mass index is 19.35 kg/m².    Physical Exam  Vitals reviewed.   Constitutional:       General: He is not in acute distress.  Cardiovascular:      Rate and Rhythm: Normal rate and regular rhythm.      Heart sounds: Normal heart sounds.   Pulmonary:      Effort: Pulmonary effort is normal.      Breath sounds: Normal breath sounds.   Abdominal:      General: Bowel sounds are normal. There is no distension.      Palpations: Abdomen is soft.      Tenderness: There is no abdominal tenderness.   Skin:     General: Skin is warm and dry.   Neurological:      Mental Status: He is alert.         Imaging Results (Most Recent)     None          Assessment & Plan     Diagnoses and all orders for this visit:    1. Personal history of colon cancer (Primary)  -     Case Request; Standing  -     Case Request    2. Family hx of colon cancer  -     Case Request; Standing  -     Case Request    Other orders  -     Implement Anesthesia Orders Day of Procedure; Standing  -     Obtain Informed Consent; Standing        Schedule colonoscopy. Use miralax prep.                 COLONOSCOPY WITH ANESTHESIA (N/A)  All risks, benefits, alternatives, and indications of colonoscopy procedure have been discussed with the patient. Risks to include perforation of the colon requiring possible surgery or colostomy, risk of bleeding from biopsies or removal of colon tissue, possibility of missing a colon polyp or cancer, or adverse drug reaction.  Benefits to include the diagnosis and management of disease of the colon and rectum. Alternatives to include barium enema, radiographic evaluation, lab testing or no intervention. Pt verbalizes understanding and agrees.     Isi Davis, LOVE

## 2023-03-21 ENCOUNTER — TELEPHONE (OUTPATIENT)
Dept: UROLOGY | Facility: CLINIC | Age: 73
End: 2023-03-21
Payer: MEDICARE

## 2023-03-21 NOTE — TELEPHONE ENCOUNTER
The [facility] received a fax that requires your attention. The document has been indexed to the patient’s chart for your review.      Reason for sending:  ORDER FOR INCONTINENCE SUPPLIES    Documents Description: INCONTINENCE SUPPLIES ORDER     Name of Sender: [facility]    Date Indexed: 03/16/2023 (EXT MED RECS)    Notes (if needed): PLEASE SIGN, DATE AND RETURN WITH MOST RECENT MEDICAL RECORDS SUPPORTING THE REQUEST.

## 2023-04-11 ENCOUNTER — TELEPHONE (OUTPATIENT)
Dept: NEUROLOGY | Facility: CLINIC | Age: 73
End: 2023-04-11
Payer: MEDICARE

## 2023-04-11 NOTE — TELEPHONE ENCOUNTER
MEDICATION CONCERNS    Caller: Paul ZAMORANO    Relationship: Self    Best call back number: [phone]    Preferred pharmacy: [facility] - [address] [phone] [facility] [phone] FX    What medications are you currently taking:   Current Outpatient Medications on File Prior to Visit   Medication Sig Dispense Refill   • amLODIPine (NORVASC) 5 MG tablet Take 1 tablet by mouth Daily.     • carbidopa-levodopa (SINEMET)  MG per tablet Take 1 tablet by mouth 3 (Three) Times a Day for 90 days. 90 tablet 2   • carbidopa-levodopa (SINEMET)  MG per tablet Take 1 tablet by mouth 3 (Three) Times a Day.     • HYDROcodone-acetaminophen (NORCO) 7.5-325 MG per tablet Take 1 tablet by mouth Every 4 (Four) Hours As Needed for Moderate Pain (Pain). 15 tablet 0   • lidocaine-prilocaine (EMLA) 2.5-2.5 % cream Apply to port site 30 minutes before it is accessed. Cover with occlusive dressing. 30 g 2   • ondansetron (ZOFRAN) 8 MG tablet Take 1 tablet by mouth Every 8 (Eight) Hours As Needed for Nausea or Vomiting. 30 tablet 0   • polyethylene glycol (MIRALAX) 17 GM/SCOOP powder Mix 17 g (1 capful) in liquid and take by mouth Daily. 238 g 0     Current Facility-Administered Medications on File Prior to Visit   Medication Dose Route Frequency Provider Last Rate Last Admin   • lidocaine (XYLOCAINE) 1 % injection 5 mL  5 mL Infiltration Once Eder Powell APRN       • sodium bicarbonate injection 0.5 mEq  1 mL Injection Once Eder Powell APRN         Which medication are you concerned about: carbidopa-levodopa (SINEMET)    Who prescribed you this medication: DR. SCOTT    When did you start taking these medications: 4/19/2022    What are your concerns: PT STATES THAT AS OF LAST Friday, FOLLOWING TAKING HIS SINEMET MEDICATION, HE HAS BEEN NAUSEOUS AND HAS HAD SOME VOMITING. PT HAS BEEN TAKING THIS MEDIATION FOR ALMOST 1 YEAR BUT NOTES THAT THESE CONCERNS ARE NEW. PT DENIES HAVING  STARTED ANY NEW MEDICATIONS RECENTLY. PT HAS TRIED TAKING THE SINEMET MEDICATION WITH FOOD BUT STILL EXPERIENCE THE NAUSEA AND OCCASIONAL VOMITING.    PT DENIES BEING AROUND ANYONE ILL RECENTLY.    PT ASKS IF SINEMET COMES IN A LOWER DOSAGE AS HE FEELS THE CURRENT DOSAGE MAY BE TOO HIGH.    How long have you had these concerns: LAST Friday, 4/7/23    PLEASE REVIEW AND ADVISE.

## 2023-04-11 NOTE — TELEPHONE ENCOUNTER
----- Message from Janna Bryson MD sent at 4/11/2023  3:50 PM CDT -----  Kind of confused why he is just telling us about this considering I last saw him almost a year ago...    He has an appointment in May, we can discuss other options then.   ----- Message -----  From: Gissell Aceves LPN  Sent: 4/11/2023   2:21 PM CDT  To: Janna Bryson MD    Paul said Sinemet makes him vomit.  He quit taking it because it made him sick, he tried taking it again and it still makes him sick.  [facility] said the last script was filled in June, he received a 30 day supply.

## 2023-04-20 ENCOUNTER — ANESTHESIA (OUTPATIENT)
Dept: GASTROENTEROLOGY | Facility: HOSPITAL | Age: 73
End: 2023-04-20
Payer: MEDICARE

## 2023-04-20 ENCOUNTER — HOSPITAL ENCOUNTER (OUTPATIENT)
Facility: HOSPITAL | Age: 73
Setting detail: HOSPITAL OUTPATIENT SURGERY
Discharge: HOME | End: 2023-04-20
Attending: INTERNAL MEDICINE | Admitting: INTERNAL MEDICINE
Payer: MEDICARE

## 2023-04-20 ENCOUNTER — ANESTHESIA EVENT (OUTPATIENT)
Dept: GASTROENTEROLOGY | Facility: HOSPITAL | Age: 73
End: 2023-04-20
Payer: MEDICARE

## 2023-04-20 VITALS
HEART RATE: 57 BPM | HEIGHT: 69 IN | WEIGHT: 125 LBS | BODY MASS INDEX: 18.51 KG/M2 | RESPIRATION RATE: 14 BRPM | TEMPERATURE: 97.7 F | DIASTOLIC BLOOD PRESSURE: 47 MMHG | SYSTOLIC BLOOD PRESSURE: 110 MMHG | OXYGEN SATURATION: 100 %

## 2023-04-20 DIAGNOSIS — Z85.038 PERSONAL HISTORY OF COLON CANCER: ICD-10-CM

## 2023-04-20 DIAGNOSIS — Z80.0 FAMILY HX OF COLON CANCER: ICD-10-CM

## 2023-04-20 PROCEDURE — 25010000002 PROPOFOL 10 MG/ML EMULSION

## 2023-04-20 PROCEDURE — 45385 COLONOSCOPY W/LESION REMOVAL: CPT | Performed by: INTERNAL MEDICINE

## 2023-04-20 PROCEDURE — C1726 CATH, BAL DIL, NON-VASCULAR: HCPCS | Performed by: INTERNAL MEDICINE

## 2023-04-20 PROCEDURE — 45380 COLONOSCOPY AND BIOPSY: CPT | Performed by: INTERNAL MEDICINE

## 2023-04-20 PROCEDURE — 88305 TISSUE EXAM BY PATHOLOGIST: CPT | Performed by: INTERNAL MEDICINE

## 2023-04-20 PROCEDURE — 45381 COLONOSCOPY SUBMUCOUS NJX: CPT | Performed by: INTERNAL MEDICINE

## 2023-04-20 PROCEDURE — 45386 COLONOSCOPY W/BALLOON DILAT: CPT | Performed by: INTERNAL MEDICINE

## 2023-04-20 PROCEDURE — 25010000002 TRIAMCINOLONE PER 10 MG: Performed by: INTERNAL MEDICINE

## 2023-04-20 RX ORDER — TRIAMCINOLONE ACETONIDE 40 MG/ML
INJECTION, SUSPENSION INTRA-ARTICULAR; INTRAMUSCULAR AS NEEDED
Status: DISCONTINUED | OUTPATIENT
Start: 2023-04-20 | End: 2023-04-20 | Stop reason: HOSPADM

## 2023-04-20 RX ORDER — LIDOCAINE HYDROCHLORIDE 10 MG/ML
0.5 INJECTION, SOLUTION EPIDURAL; INFILTRATION; INTRACAUDAL; PERINEURAL ONCE AS NEEDED
Status: DISCONTINUED | OUTPATIENT
Start: 2023-04-20 | End: 2023-04-20 | Stop reason: HOSPADM

## 2023-04-20 RX ORDER — SODIUM CHLORIDE 9 MG/ML
500 INJECTION, SOLUTION INTRAVENOUS CONTINUOUS PRN
Status: DISCONTINUED | OUTPATIENT
Start: 2023-04-20 | End: 2023-04-20 | Stop reason: HOSPADM

## 2023-04-20 RX ORDER — PROPOFOL 10 MG/ML
VIAL (ML) INTRAVENOUS AS NEEDED
Status: DISCONTINUED | OUTPATIENT
Start: 2023-04-20 | End: 2023-04-20 | Stop reason: SURG

## 2023-04-20 RX ORDER — SODIUM CHLORIDE 0.9 % (FLUSH) 0.9 %
10 SYRINGE (ML) INJECTION AS NEEDED
Status: DISCONTINUED | OUTPATIENT
Start: 2023-04-20 | End: 2023-04-20 | Stop reason: HOSPADM

## 2023-04-20 RX ORDER — SODIUM CHLORIDE 9 MG/ML
100 INJECTION, SOLUTION INTRAVENOUS CONTINUOUS
Status: DISCONTINUED | OUTPATIENT
Start: 2023-04-20 | End: 2023-04-20 | Stop reason: HOSPADM

## 2023-04-20 RX ORDER — BUPIVACAINE HCL/0.9 % NACL/PF 0.125 %
PLASTIC BAG, INJECTION (ML) EPIDURAL AS NEEDED
Status: DISCONTINUED | OUTPATIENT
Start: 2023-04-20 | End: 2023-04-20 | Stop reason: SURG

## 2023-04-20 RX ORDER — SODIUM CHLORIDE 0.9 % (FLUSH) 0.9 %
10 SYRINGE (ML) INJECTION EVERY 12 HOURS SCHEDULED
Status: DISCONTINUED | OUTPATIENT
Start: 2023-04-20 | End: 2023-04-20 | Stop reason: HOSPADM

## 2023-04-20 RX ORDER — SODIUM CHLORIDE 9 MG/ML
INJECTION, SOLUTION INTRAVENOUS CONTINUOUS PRN
Status: DISCONTINUED | OUTPATIENT
Start: 2023-04-20 | End: 2023-04-20 | Stop reason: SURG

## 2023-04-20 RX ORDER — LIDOCAINE HYDROCHLORIDE 20 MG/ML
INJECTION, SOLUTION EPIDURAL; INFILTRATION; INTRACAUDAL; PERINEURAL AS NEEDED
Status: DISCONTINUED | OUTPATIENT
Start: 2023-04-20 | End: 2023-04-20 | Stop reason: SURG

## 2023-04-20 RX ORDER — SODIUM CHLORIDE 9 MG/ML
40 INJECTION, SOLUTION INTRAVENOUS AS NEEDED
Status: DISCONTINUED | OUTPATIENT
Start: 2023-04-20 | End: 2023-04-20 | Stop reason: HOSPADM

## 2023-04-20 RX ORDER — ONDANSETRON 2 MG/ML
4 INJECTION INTRAMUSCULAR; INTRAVENOUS ONCE AS NEEDED
Status: DISCONTINUED | OUTPATIENT
Start: 2023-04-20 | End: 2023-04-20 | Stop reason: HOSPADM

## 2023-04-20 RX ADMIN — Medication 100 MCG: at 10:25

## 2023-04-20 RX ADMIN — SODIUM CHLORIDE: 9 INJECTION, SOLUTION INTRAVENOUS at 10:15

## 2023-04-20 RX ADMIN — LIDOCAINE HYDROCHLORIDE 60 MG: 20 INJECTION, SOLUTION EPIDURAL; INFILTRATION; INTRACAUDAL; PERINEURAL at 10:19

## 2023-04-20 RX ADMIN — PROPOFOL INJECTABLE EMULSION 200 MG: 10 INJECTION, EMULSION INTRAVENOUS at 10:19

## 2023-04-20 NOTE — H&P
[facility] Gastroenterology  Pre Procedure History & Physical    Chief Complaint:   Personal history of colon cancer    Subjective     HPI:   He underwent colonoscopy examination last fall.  He is found to have obstructing malignant lesion in the left colon.  He underwent surgical resection in September 2022.  He presents now for follow-up colonoscopy.  He is asymptomatic.    Past Medical History:   Past Medical History:   Diagnosis Date   • Cancer    • Colonic mass     pt states biopsy during colonoscopy was negative for cancer   • Dysuria    • Enlarged prostate    • Hypertension    • Parkinson's disease    • Urgency of urination        Past Surgical History:  Past Surgical History:   Procedure Laterality Date   • COLON RESECTION N/A 9/2/2022    Procedure: COLON RESECTION LAPAROSCOPIC SIGMOID OR LOW ANTERIOR;  Surgeon: Scarlet Fishman MD;  Location: [facility] OR;  Service: General;  Laterality: N/A;   • COLONOSCOPY N/A 8/18/2022    Procedure: COLONOSCOPY WITH ANESTHESIA;  Surgeon: Emil Moody MD;  Location: [facility] ENDOSCOPY;  Service: Gastroenterology;  Laterality: N/A;  pre anemia, LLQ pain  post  phong gaudy aprn   • CYSTOSCOPY     • CYSTOSCOPY TRANSURETHRAL RESECTION OF PROSTATE N/A 3/28/2017    Procedure: CYSTOSCOPY TRANSURETHRAL RESECTION OF PROSTATE;  Surgeon: Salbador Aguilar MD;  Location: [facility] OR;  Service:    • ENDOSCOPY N/A 8/18/2022    Procedure: ESOPHAGOGASTRODUODENOSCOPY WITH ANESTHESIA;  Surgeon: Emil Moody MD;  Location: [facility] ENDOSCOPY;  Service: Gastroenterology;  Laterality: N/A;  pre anemia, LLQ pain  post gastritis  phong gaudy aprn   • HEMORRHOIDECTOMY      AS TEENAGER   • VENOUS ACCESS DEVICE (PORT) INSERTION N/A 11/7/2022    Procedure: INSERTION VENOUS ACCESS DEVICE;  Surgeon: Scarlet Fishman MD;  Location: [facility] OR;  Service: General;  Laterality: N/A;        Family History:  Family History   Problem Relation Age of Onset   • No Known Problems Mother    • No  "Known Problems Father    • Colon cancer Maternal Uncle    • Colon polyps Neg Hx        Social History:   reports that he has been smoking cigarettes. He has a 10.00 pack-year smoking history. He has never used smokeless tobacco. He reports current alcohol use of about 2.0 standard drinks per week. He reports that he does not use drugs.    Medications:   Prior to Admission medications    Medication Sig Start Date End Date Taking? Authorizing Provider   amLODIPine (NORVASC) 5 MG tablet Take 1 tablet by mouth Daily.   Yes Yaya Manzo MD   polyethylene glycol (MIRALAX) 17 GM/SCOOP powder Mix 17 g (1 capful) in liquid and take by mouth Daily. 12/7/22  Yes Freddie Layne MD   carbidopa-levodopa (SINEMET)  MG per tablet Take 1 tablet by mouth 3 (Three) Times a Day for 90 days. 6/3/22 11/21/22  Janna Bryson MD   carbidopa-levodopa (SINEMET)  MG per tablet Take 1 tablet by mouth 3 (Three) Times a Day.    ProviderYaya MD   HYDROcodone-acetaminophen (NORCO) 7.5-325 MG per tablet Take 1 tablet by mouth Every 4 (Four) Hours As Needed for Moderate Pain (Pain). 11/7/22   Scarlet Fishman MD   lidocaine-prilocaine (EMLA) 2.5-2.5 % cream Apply to port site 30 minutes before it is accessed. Cover with occlusive dressing. 12/7/22   Freddie Layne MD   ondansetron (ZOFRAN) 8 MG tablet Take 1 tablet by mouth Every 8 (Eight) Hours As Needed for Nausea or Vomiting. 10/3/22   Freddie Layne MD       Allergies:  Patient has no known allergies.    ROS:    General: Weight stable  Respiratory: No SOA  Cardiovascular: No CP    Objective     Blood pressure 125/52, pulse 85, temperature 97.7 °F (36.5 °C), temperature source Temporal, resp. rate 20, height 175.3 cm (69\"), weight 56.7 kg (125 lb), SpO2 98 %.    Physical Exam   Constitutional: Pt is oriented to person, place, and in no distress.   Cardiovascular: Normal rate, regular rhythm.    Pulmonary/Chest: Effort normal. No respiratory distress.  "   Abdominal: Nondistended.  Psychiatric: Mood, memory, affect and judgment appear normal.     Assessment & Plan     Diagnosis:  Personal history of colon cancer    Anticipated Surgical Procedure:  Colonoscopy    The risks, benefits, and alternatives of this procedure have been discussed with the patient or the responsible party- the patient understands and agrees to proceed.    EMR Dragon/transcription disclaimer:  Much of this encounter note is electronic transcription/translation of spoken language to printed text.  The electronic translation of spoken language may be erroneous, or at times, nonsensical words or phrases may be inadvertently transcribed.  Although I have reviewed the note for such errors, some may still exist.

## 2023-04-20 NOTE — ANESTHESIA POSTPROCEDURE EVALUATION
"Patient: Paul ZAMORANO    Procedure Summary     Date: 04/20/23 Room / Location: [facility] ENDOSCOPY 5 /  [facility]    Anesthesia Start: 1014 Anesthesia Stop: 1054    Procedure: COLONOSCOPY WITH ANESTHESIA Diagnosis:       Personal history of colon cancer      Family hx of colon cancer      (Personal history of colon cancer [Z85.038])      (Family hx of colon cancer [Z80.0])    Surgeons: Emil Moody MD Provider: Will Paniagua CRNA    Anesthesia Type: MAC ASA Status: 3          Anesthesia Type: MAC    Vitals  Vitals Value Taken Time   /47 04/20/23 1111   Temp     Pulse 62 04/20/23 1113   Resp 14 04/20/23 1110   SpO2 100 % 04/20/23 1113   Vitals shown include unvalidated device data.        Post Anesthesia Care and Evaluation    Patient location during evaluation: PHASE II  Patient participation: complete - patient participated  Level of consciousness: awake  Pain management: adequate    Airway patency: patent  Anesthetic complications: No anesthetic complications    Cardiovascular status: acceptable  Respiratory status: acceptable  Hydration status: acceptable    Comments: /47   Pulse 57   Temp 97.7 °F (36.5 °C) (Temporal)   Resp 14   Ht 175.3 cm (69\")   Wt 56.7 kg (125 lb)   SpO2 100%   BMI 18.46 kg/m²         "

## 2023-04-21 LAB
CYTO UR: NORMAL
LAB AP CASE REPORT: NORMAL
Lab: NORMAL
PATH REPORT.FINAL DX SPEC: NORMAL
PATH REPORT.GROSS SPEC: NORMAL

## 2023-05-02 ENCOUNTER — OFFICE VISIT (OUTPATIENT)
Dept: NEUROLOGY | Facility: CLINIC | Age: 73
End: 2023-05-02
Payer: MEDICARE

## 2023-05-02 VITALS
BODY MASS INDEX: 19.08 KG/M2 | OXYGEN SATURATION: 99 % | HEART RATE: 96 BPM | WEIGHT: 128.8 LBS | HEIGHT: 69 IN | DIASTOLIC BLOOD PRESSURE: 84 MMHG | SYSTOLIC BLOOD PRESSURE: 142 MMHG

## 2023-05-02 DIAGNOSIS — G20 PARKINSON DISEASE: Primary | ICD-10-CM

## 2023-05-02 PROCEDURE — 1159F MED LIST DOCD IN RCRD: CPT | Performed by: PSYCHIATRY & NEUROLOGY

## 2023-05-02 PROCEDURE — 99214 OFFICE O/P EST MOD 30 MIN: CPT | Performed by: PSYCHIATRY & NEUROLOGY

## 2023-05-02 PROCEDURE — 1160F RVW MEDS BY RX/DR IN RCRD: CPT | Performed by: PSYCHIATRY & NEUROLOGY

## 2023-05-02 RX ORDER — CIPROFLOXACIN 500 MG/1
TABLET, FILM COATED ORAL
COMMUNITY
Start: 2023-04-12

## 2023-05-02 RX ORDER — CARBIDOPA AND LEVODOPA 50; 200 MG/1; MG/1
1 TABLET, EXTENDED RELEASE ORAL 2 TIMES DAILY
Qty: 60 TABLET | Refills: 2 | Status: SHIPPED | OUTPATIENT
Start: 2023-05-02 | End: 2023-07-31

## 2023-05-02 NOTE — PATIENT INSTRUCTIONS
Start carbidopa/levodopa (Sinemet) CR: take one pill once a day for a few days then increase to 1 pill twice a day if no nausea.   If you have nausea, call me and we will try something else.   Come back to see me 6-8 weeks.
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
## 2023-05-12 ENCOUNTER — TELEPHONE (OUTPATIENT)
Dept: NEUROLOGY | Facility: CLINIC | Age: 73
End: 2023-05-12
Payer: MEDICARE

## 2023-05-12 DIAGNOSIS — G20 PARKINSON DISEASE: Primary | ICD-10-CM

## 2023-05-12 RX ORDER — AMANTADINE HYDROCHLORIDE 100 MG/1
100 CAPSULE, GELATIN COATED ORAL 2 TIMES DAILY
Qty: 60 CAPSULE | Refills: 2 | Status: SHIPPED | OUTPATIENT
Start: 2023-05-12

## 2023-05-12 NOTE — TELEPHONE ENCOUNTER
Spoke with the patient and advised that  said its okay to d/c the sinemet. An rx for amantadine has been called into his pharamcy to start in replace of the sinemet. I advised the patient to let me know how he does with this medication. He verbalizes understanding.

## 2023-05-12 NOTE — TELEPHONE ENCOUNTER
Caller: Paul ZAMORANO WILLIAM    Relationship: Self    Best call back number: [phone]    What medications are you currently taking:   Current Outpatient Medications on File Prior to Visit   Medication Sig Dispense Refill   • amLODIPine (NORVASC) 5 MG tablet Take 1 tablet by mouth Daily.     • carbidopa-levodopa CR (SINEMET CR)  MG per CR tablet Take 1 tablet by mouth 2 (Two) Times a Day for 90 days. 60 tablet 2   • ciprofloxacin (CIPRO) 500 MG tablet TAKE 1 TABLET BY MOUTH EVERY 12 HOURS FOR 10 DAYS FOR UTI     • polyethylene glycol (MIRALAX) 17 GM/SCOOP powder Mix 17 g (1 capful) in liquid and take by mouth Daily. 238 g 0     Current Facility-Administered Medications on File Prior to Visit   Medication Dose Route Frequency Provider Last Rate Last Admin   • lidocaine (XYLOCAINE) 1 % injection 5 mL  5 mL Infiltration Once Eder Powell APRN       • sodium bicarbonate injection 0.5 mEq  1 mL Injection Once Eder Powell APRN              Which medication are you concerned about: SINEMET    Who prescribed you this medication: RODERICK DIAL    What are your concerns: PT STATED MEDICATION IS STILL MAKING HIM SICK     How long have you had these concerns: 5/02/23      PLEASE REVIEW

## 2023-08-15 DIAGNOSIS — G20 PARKINSON DISEASE: ICD-10-CM

## 2023-08-15 RX ORDER — AMANTADINE HYDROCHLORIDE 100 MG/1
CAPSULE, GELATIN COATED ORAL
Qty: 60 CAPSULE | Refills: 0 | Status: SHIPPED | OUTPATIENT
Start: 2023-08-15

## 2023-09-08 ENCOUNTER — OFFICE VISIT (OUTPATIENT)
Dept: NEUROLOGY | Facility: CLINIC | Age: 73
End: 2023-09-08
Payer: MEDICARE

## 2023-09-08 VITALS
DIASTOLIC BLOOD PRESSURE: 72 MMHG | OXYGEN SATURATION: 100 % | WEIGHT: 126 LBS | BODY MASS INDEX: 18.66 KG/M2 | SYSTOLIC BLOOD PRESSURE: 122 MMHG | HEART RATE: 80 BPM | HEIGHT: 69 IN

## 2023-09-08 DIAGNOSIS — G20 PARKINSON DISEASE: ICD-10-CM

## 2023-09-08 RX ORDER — AMANTADINE HYDROCHLORIDE 100 MG/1
100 CAPSULE, GELATIN COATED ORAL 2 TIMES DAILY
Qty: 60 CAPSULE | Refills: 5 | Status: SHIPPED | OUTPATIENT
Start: 2023-09-08

## 2023-09-08 NOTE — PROGRESS NOTES
Chief Complaint  No chief complaint on file.    Madiha ZAMORANO presents to [facility]    History of Present Illness  72-year-old male here for follow-up. Even sinemet CR caused side effects. Switched to amantadine. Doing much better.     Past Medical History:   Diagnosis Date    Cancer     Colonic mass     pt states biopsy during colonoscopy was negative for cancer    Dysuria     Enlarged prostate     Hypertension     Parkinson's disease     Urgency of urination           Current Outpatient Medications:     amantadine (SYMMETREL) 100 MG capsule, TAKE ONE CAPSULE TWICE DAILY, Disp: 60 capsule, Rfl: 0    amLODIPine (NORVASC) 5 MG tablet, Take 1 tablet by mouth Daily., Disp: , Rfl:     ciprofloxacin (CIPRO) 500 MG tablet, TAKE 1 TABLET BY MOUTH EVERY 12 HOURS FOR 10 DAYS FOR UTI, Disp: , Rfl:     polyethylene glycol (MIRALAX) 17 GM/SCOOP powder, Mix 17 g (1 capful) in liquid and take by mouth Daily., Disp: 238 g, Rfl: 0    Current Facility-Administered Medications:     lidocaine (XYLOCAINE) 1 % injection 5 mL, 5 mL, Infiltration, Once, Eder Powell APRN    sodium bicarbonate injection 0.5 mEq, 1 mL, Injection, Once, Eder Powell, LOVE       Objective   Vital Signs:   There were no vitals taken for this visit.    Physical Exam   Neurological Exam     Motor   Strength is 5/5 throughout all four extremities.  Right rest tremor  No postural tremor   Cogwheel rigidity and bradykinesia on right  Hypomimia.  Reduced arm swing on right, no shuffling    Result Review :                     Assessment and Plan   72-year-old male with Parkinson's disease.  Did have benefit on Sinemet but now states it causes nausea and vomiting.  We discussed several options like trialing amantadine versus dopamine agonist.  Sinemet CR still caused side effects. Trialed amantadine and doing much better.     Plan:    Continue amantadine 100 mg BID.  Follow up 6 months.         Follow Up   No  follow-ups on file.  Patient was given instructions and counseling regarding his condition or for health maintenance advice. Please see specific information pulled into the AVS if appropriate.

## 2024-01-31 ENCOUNTER — TELEPHONE (OUTPATIENT)
Dept: NEUROLOGY | Facility: CLINIC | Age: 74
End: 2024-01-31
Payer: MEDICARE

## 2024-01-31 NOTE — TELEPHONE ENCOUNTER
DIOGO with the patient's daughter in regards to his appointment on March 8th.  I did let her know this appointment was made in error and he has been rescheduled to March 14th at 0845 am.  I did tell her to call our office if this day and time does not work.

## 2024-03-14 ENCOUNTER — OFFICE VISIT (OUTPATIENT)
Dept: NEUROLOGY | Facility: CLINIC | Age: 74
End: 2024-03-14
Payer: MEDICARE

## 2024-03-14 VITALS
OXYGEN SATURATION: 97 % | WEIGHT: 130 LBS | HEART RATE: 101 BPM | HEIGHT: 69 IN | BODY MASS INDEX: 19.26 KG/M2 | SYSTOLIC BLOOD PRESSURE: 152 MMHG | DIASTOLIC BLOOD PRESSURE: 90 MMHG

## 2024-03-14 DIAGNOSIS — G20.A1 PARKINSON'S DISEASE WITHOUT DYSKINESIA OR FLUCTUATING MANIFESTATIONS: Primary | ICD-10-CM

## 2024-03-14 PROCEDURE — 1160F RVW MEDS BY RX/DR IN RCRD: CPT | Performed by: PSYCHIATRY & NEUROLOGY

## 2024-03-14 PROCEDURE — 99214 OFFICE O/P EST MOD 30 MIN: CPT | Performed by: PSYCHIATRY & NEUROLOGY

## 2024-03-14 PROCEDURE — 1159F MED LIST DOCD IN RCRD: CPT | Performed by: PSYCHIATRY & NEUROLOGY

## 2024-03-14 RX ORDER — AMANTADINE HYDROCHLORIDE 100 MG/1
100 TABLET ORAL 2 TIMES DAILY
COMMUNITY
Start: 2024-01-16

## 2024-03-14 RX ORDER — AMANTADINE HYDROCHLORIDE 100 MG/1
100 TABLET ORAL 2 TIMES DAILY
Qty: 60 TABLET | Refills: 5 | Status: CANCELLED | OUTPATIENT
Start: 2024-03-14 | End: 2024-09-10

## 2024-03-14 NOTE — PROGRESS NOTES
"Chief Complaint    Subjective        Paul ZAMORANO presents to [facility]    History of Present Illness  73-year-old male here for follow-up. No new issues. No falls. Overall stable.       Past Medical History:   Diagnosis Date    Cancer     Colonic mass     pt states biopsy during colonoscopy was negative for cancer    Dysuria     Enlarged prostate     Hypertension     Parkinson's disease     Urgency of urination           Current Outpatient Medications:     amantadine (SYMMETREL) 100 MG tablet, Take 1 tablet by mouth 2 (Two) Times a Day., Disp: , Rfl:     amLODIPine (NORVASC) 5 MG tablet, Take 1 tablet by mouth Daily., Disp: , Rfl:     polyethylene glycol (MIRALAX) 17 GM/SCOOP powder, Mix 17 g (1 capful) in liquid and take by mouth Daily., Disp: 238 g, Rfl: 0    Current Facility-Administered Medications:     lidocaine (XYLOCAINE) 1 % injection 5 mL, 5 mL, Infiltration, Once, Eder Powell, LOVE    sodium bicarbonate injection 0.5 mEq, 1 mL, Injection, Once, Eder Powell APRN       Objective   Vital Signs:   /90 (BP Location: Right arm, Patient Position: Sitting, Cuff Size: Adult)   Pulse 101   Ht 175.3 cm (69\")   Wt 59 kg (130 lb)   SpO2 97%   BMI 19.20 kg/m²     Physical Exam   Neurological Exam     Motor   Strength is 5/5 throughout all four extremities.  Right rest tremor  No postural tremor   Cogwheel rigidity and bradykinesia on right  Hypomimia.  Reduced arm swing on right, no shuffling    Result Review :                     Assessment and Plan   73-year-old male with Parkinson's disease.  Did have benefit on Sinemet but was unable to tolerate.   Sinemet CR still caused side effects. Trialed amantadine and doing much better.     Plan:    Continue amantadine 100 mg BID.  Follow up 6 months.         Follow Up   No follow-ups on file.  Patient was given instructions and counseling regarding his condition or for health maintenance advice. Please see specific " information pulled into the AVS if appropriate.

## 2024-04-19 DIAGNOSIS — G20.A1 PARKINSON'S DISEASE WITHOUT DYSKINESIA OR FLUCTUATING MANIFESTATIONS: Primary | ICD-10-CM

## 2024-04-19 RX ORDER — AMANTADINE HYDROCHLORIDE 100 MG/1
100 CAPSULE, GELATIN COATED ORAL 2 TIMES DAILY
Qty: 60 CAPSULE | Refills: 4 | Status: SHIPPED | OUTPATIENT
Start: 2024-04-19

## 2024-10-15 NOTE — PROGRESS NOTES
Subjective    Mr. ZAMORANO is 73 y.o. male    Chief Complaint: BPH    History of Present Illness  Patient is referred to us by his PCP as a new patient he did see or has seen Dr. Aguilar back in 2017.  Patient does have a history of prostate cancer.  Most recent PSA was 4.0.  To 3.2 April 2023.  Patient had history of TURP done 2017 however he now complains of weak urine stream and frequency.  Prostate cancer was diagnosed in March 2017 he has undergone active surveillance.  Path at the time of biops showed T1c [place] 7 adenocarcinoma the prostate.    Patient also has been treated and is currently taking antibiotic for recent urinary tract infection.  Had burning urgency fever and chills.  He does not have a history of recurrent UTIs.  His urine is clear today.      The following portions of the patient's history were reviewed and updated as appropriate: allergies, current medications, past family history, past medical history, past social history, past surgical history and problem list.    Review of Systems   Genitourinary:  Positive for difficulty urinating and urgency. Negative for dysuria and hematuria.         Current Outpatient Medications:     amantadine (SYMMETREL) 100 MG capsule, TAKE ONE CAPSULE TWICE DAILY, Disp: 60 capsule, Rfl: 4    amLODIPine (NORVASC) 5 MG tablet, Take 1 tablet by mouth Daily., Disp: , Rfl:     linezolid (ZYVOX) 600 MG tablet, , Disp: , Rfl:     polyethylene glycol (MIRALAX) 17 GM/SCOOP powder, Mix 17 g (1 capful) in liquid and take by mouth Daily., Disp: 238 g, Rfl: 0    amantadine (SYMMETREL) 100 MG tablet, Take 1 tablet by mouth 2 (Two) Times a Day. (Patient not taking: Reported on 10/22/2024), Disp: , Rfl:     tamsulosin (FLOMAX) 0.4 MG capsule 24 hr capsule, Take 1 capsule by mouth Every Night for 90 days., Disp: 30 capsule, Rfl: 2    Current Facility-Administered Medications:     lidocaine (XYLOCAINE) 1 % injection 5 mL, 5 mL, Infiltration, Once, Eder Powell, APRN    sodium  bicarbonate injection 0.5 mEq, 1 mL, Injection, Once, Eder Powell APRN    Past Medical History:   Diagnosis Date    Cancer     Colonic mass     pt states biopsy during colonoscopy was negative for cancer    Dysuria     Enlarged prostate     Hypertension     Parkinson's disease     Urgency of urination        Past Surgical History:   Procedure Laterality Date    COLON RESECTION N/A 9/2/2022    Procedure: COLON RESECTION LAPAROSCOPIC SIGMOID OR LOW ANTERIOR;  Surgeon: Scarlet Fishmna MD;  Location:  [facility] OR;  Service: General;  Laterality: N/A;    COLONOSCOPY N/A 8/18/2022    Procedure: COLONOSCOPY WITH ANESTHESIA;  Surgeon: Emil Moody MD;  Location:  [facility] ENDOSCOPY;  Service: Gastroenterology;  Laterality: N/A;  pre anemia, LLQ pain  post  phong osorio    COLONOSCOPY N/A 4/20/2023    Procedure: COLONOSCOPY WITH ANESTHESIA;  Surgeon: Emil Moody MD;  Location: [facility] ENDOSCOPY;  Service: Gastroenterology;  Laterality: N/A;  preop: hx colon cancer   post op: stricture at anastamosis; polyp  PCP: Phong Doss APRN    CYSTOSCOPY      CYSTOSCOPY TRANSURETHRAL RESECTION OF PROSTATE N/A 3/28/2017    Procedure: CYSTOSCOPY TRANSURETHRAL RESECTION OF PROSTATE;  Surgeon: Salbador Aguilar MD;  Location: [facility] OR;  Service:     ENDOSCOPY N/A 8/18/2022    Procedure: ESOPHAGOGASTRODUODENOSCOPY WITH ANESTHESIA;  Surgeon: Emil Moody MD;  Location: [facility] ENDOSCOPY;  Service: Gastroenterology;  Laterality: N/A;  pre anemia, LLQ pain  post gastritis  phong osorio    HEMORRHOIDECTOMY      AS TEENAGER    VENOUS ACCESS DEVICE (PORT) INSERTION N/A 11/7/2022    Procedure: INSERTION VENOUS ACCESS DEVICE;  Surgeon: Scarlet Fishman MD;  Location: [facility] OR;  Service: General;  Laterality: N/A;       Social History     Socioeconomic History    Marital status: Legally    Tobacco Use    Smoking status: Every Day     Current packs/day: 0.50     Average packs/day: 0.5 packs/day for 20.0  "years (10.0 ttl pk-yrs)     Types: Cigarettes     Passive exposure: Never    Smokeless tobacco: Never   Vaping Use    Vaping status: Never Used   Substance and Sexual Activity    Alcohol use: Yes     Alcohol/week: 2.0 standard drinks of alcohol     Types: 2 Cans of beer per week     Comment: occ    Drug use: No    Sexual activity: Defer       Family History   Problem Relation Age of Onset    No Known Problems Mother     No Known Problems Father     Colon cancer Maternal Uncle     Colon polyps Neg Hx        Objective    Temp 97.8 °F (36.6 °C)   Ht 175.3 cm (69\")   Wt 57.2 kg (126 lb)   BMI 18.61 kg/m²     Physical Exam  Constitutional:       Appearance: Normal appearance.   HENT:      Head: Normocephalic and atraumatic.   Pulmonary:      Effort: Pulmonary effort is normal.   Skin:     Coloration: Skin is not pale.   Neurological:      Mental Status: He is alert.   Psychiatric:         Mood and Affect: Mood normal.         Behavior: Behavior normal.             Results for orders placed or performed in visit on 10/22/24   POC Urinalysis Dipstick, Multipro    Collection Time: 10/22/24  9:47 AM    Specimen: Urine   Result Value Ref Range    Color Yellow Yellow, Straw, Dark Yellow, Stacy    Clarity, UA Clear Clear    Glucose, UA Negative Negative mg/dL    Bilirubin Negative Negative    Ketones, UA Negative Negative    Specific Gravity  1.030 1.005 - 1.030    Blood, UA Small (A) Negative    pH, Urine 5.5 5.0 - 8.0    Protein,  mg/dL (A) Negative mg/dL    Urobilinogen, UA 0.2 E.U./dL Normal, 0.2 E.U./dL    Nitrite, UA Negative Negative    Leukocytes Negative Negative   Estimation of residual urine via abdominal ultrasound  Residual Urine: 0 ml  Indication: weak urine stream  Position: Supine  Examination: Incremental scanning of the suprapubic area using 3 MHz transducer using copious amounts of acoustic gel.   Findings: An anechoic area was demonstrated which represented the bladder, with measurement of residual " urine as noted. I inspected this myself. In that the residual urine was stable or insignificant, no treatment will be necessary at this time.      Assessment and Plan    Diagnoses and all orders for this visit:    1. BPH with obstruction/lower urinary tract symptoms (Primary)  -     POC Urinalysis Dipstick, Multipro  -     tamsulosin (FLOMAX) 0.4 MG capsule 24 hr capsule; Take 1 capsule by mouth Every Night for 90 days.  Dispense: 30 capsule; Refill: 2    2. History of prostate cancer  -     PSA DIAGNOSTIC      Given his worsening symptoms despite having a TURP 2017 we will start patient on tamsulosin.      Also will check PSA today.  He has had no active treatment for his prostate cancer he has not had any repeat biopsies since the initial biopsy 2017.    After PSA available will arrange follow-up at that time.

## 2024-10-22 ENCOUNTER — OFFICE VISIT (OUTPATIENT)
Dept: UROLOGY | Facility: CLINIC | Age: 74
End: 2024-10-22
Payer: MEDICARE

## 2024-10-22 ENCOUNTER — LAB (OUTPATIENT)
Dept: LAB | Facility: HOSPITAL | Age: 74
End: 2024-10-22
Payer: MEDICARE

## 2024-10-22 VITALS — BODY MASS INDEX: 18.66 KG/M2 | TEMPERATURE: 97.8 F | HEIGHT: 69 IN | WEIGHT: 126 LBS

## 2024-10-22 DIAGNOSIS — Z85.46 HISTORY OF PROSTATE CANCER: ICD-10-CM

## 2024-10-22 DIAGNOSIS — N13.8 BPH WITH OBSTRUCTION/LOWER URINARY TRACT SYMPTOMS: Primary | ICD-10-CM

## 2024-10-22 DIAGNOSIS — N40.1 BPH WITH OBSTRUCTION/LOWER URINARY TRACT SYMPTOMS: Primary | ICD-10-CM

## 2024-10-22 LAB
BILIRUB BLD-MCNC: NEGATIVE MG/DL
CLARITY, POC: CLEAR
COLOR UR: YELLOW
GLUCOSE UR STRIP-MCNC: NEGATIVE MG/DL
KETONES UR QL: NEGATIVE
LEUKOCYTE EST, POC: NEGATIVE
NITRITE UR-MCNC: NEGATIVE MG/ML
PH UR: 5.5 [PH] (ref 5–8)
PROT UR STRIP-MCNC: ABNORMAL MG/DL
PSA SERPL-MCNC: 6.28 NG/ML (ref 0–4)
RBC # UR STRIP: ABNORMAL /UL
SP GR UR: 1.03 (ref 1–1.03)
UROBILINOGEN UR QL: ABNORMAL

## 2024-10-22 PROCEDURE — 84153 ASSAY OF PSA TOTAL: CPT | Performed by: PHYSICIAN ASSISTANT

## 2024-10-22 PROCEDURE — 36415 COLL VENOUS BLD VENIPUNCTURE: CPT | Performed by: PHYSICIAN ASSISTANT

## 2024-10-22 RX ORDER — TAMSULOSIN HYDROCHLORIDE 0.4 MG/1
1 CAPSULE ORAL NIGHTLY
Qty: 30 CAPSULE | Refills: 2 | Status: SHIPPED | OUTPATIENT
Start: 2024-10-22

## 2024-10-22 RX ORDER — LINEZOLID 600 MG/1
TABLET, FILM COATED ORAL
COMMUNITY
Start: 2024-10-14

## 2024-10-24 ENCOUNTER — TELEPHONE (OUTPATIENT)
Dept: UROLOGY | Facility: CLINIC | Age: 74
End: 2024-10-24
Payer: MEDICARE

## 2024-10-24 DIAGNOSIS — R97.20 ELEVATED PROSTATE SPECIFIC ANTIGEN (PSA): ICD-10-CM

## 2024-10-24 DIAGNOSIS — C61 PROSTATE CANCER: Primary | ICD-10-CM

## 2024-10-24 NOTE — TELEPHONE ENCOUNTER
The [facility] received a fax that requires your attention. The document has been indexed to the patient’s chart for your review.      Reason for sending: PLEASE REVIEW LAB RESULTS    Documents Description: URINE CULTURE 10.5.24 AND URINALYSIS 10.5.24    Name of Sender: [employer] [employer]    Date Indexed: 10.24.24    Notes (if needed): HIGH PRIORITY DUE TO COVER SHEET STATING URGENT

## 2024-10-30 DIAGNOSIS — N40.1 BPH WITH OBSTRUCTION/LOWER URINARY TRACT SYMPTOMS: ICD-10-CM

## 2024-10-30 DIAGNOSIS — N13.8 BPH WITH OBSTRUCTION/LOWER URINARY TRACT SYMPTOMS: ICD-10-CM

## 2024-10-30 RX ORDER — TAMSULOSIN HYDROCHLORIDE 0.4 MG/1
1 CAPSULE ORAL NIGHTLY
Qty: 30 CAPSULE | Refills: 2 | Status: SHIPPED | OUTPATIENT
Start: 2024-10-30

## 2024-11-13 ENCOUNTER — TELEPHONE (OUTPATIENT)
Dept: UROLOGY | Facility: CLINIC | Age: 74
End: 2024-11-13
Payer: MEDICARE

## 2024-11-13 NOTE — TELEPHONE ENCOUNTER
The [facility] received a fax that requires your attention. The document has been indexed to the patient’s chart for your review.      Reason for sending: PLEASE REVIEW LAB RESULTS    Documents Description: LABS 11.7.24 FROM [facility]    Name of Sender: [facility]    Date Indexed: 11.13.24    Notes (if needed):

## 2025-02-13 ENCOUNTER — TELEPHONE (OUTPATIENT)
Dept: GASTROENTEROLOGY | Facility: CLINIC | Age: 75
End: 2025-02-13
Payer: MEDICARE

## 2025-02-13 NOTE — TELEPHONE ENCOUNTER
Spoke with Arnol explaining pt is not scheduled to see us so if Nicole Shrestha wants us to see pt they need to make a referral to our office.

## 2025-02-13 NOTE — TELEPHONE ENCOUNTER
----- Message from Emil Moody sent at 2/12/2025  2:48 PM CST -----  Please inform the clinic that sent a copy of these labs to us that the patient has not an active patient of ours currently.  We have not seen the patient in over 2 years.  And the patient does not have an upcoming appointment for colonoscopy or an office visit.  If they desire to have us see the patient for a particular reason, please refer the patient to the office for evaluation  ----- Message -----  From: Compa Sanches LPN  Sent: 2/12/2025  11:54 AM CST  To: Emil Moody MD

## 2025-03-05 ENCOUNTER — OFFICE VISIT (OUTPATIENT)
Dept: GASTROENTEROLOGY | Facility: CLINIC | Age: 75
End: 2025-03-05
Payer: MEDICARE

## 2025-03-05 VITALS
BODY MASS INDEX: 19.55 KG/M2 | HEIGHT: 69 IN | SYSTOLIC BLOOD PRESSURE: 142 MMHG | DIASTOLIC BLOOD PRESSURE: 82 MMHG | WEIGHT: 132 LBS | HEART RATE: 48 BPM | OXYGEN SATURATION: 99 % | TEMPERATURE: 97.8 F

## 2025-03-05 DIAGNOSIS — C19 ADENOCARCINOMA OF RECTOSIGMOID JUNCTION: ICD-10-CM

## 2025-03-05 DIAGNOSIS — D50.9 IRON DEFICIENCY ANEMIA, UNSPECIFIED IRON DEFICIENCY ANEMIA TYPE: Primary | ICD-10-CM

## 2025-03-05 PROCEDURE — 1159F MED LIST DOCD IN RCRD: CPT | Performed by: NURSE PRACTITIONER

## 2025-03-05 PROCEDURE — 1160F RVW MEDS BY RX/DR IN RCRD: CPT | Performed by: NURSE PRACTITIONER

## 2025-03-05 PROCEDURE — 99214 OFFICE O/P EST MOD 30 MIN: CPT | Performed by: NURSE PRACTITIONER

## 2025-03-05 RX ORDER — POLYETHYLENE GLYCOL 3350, SODIUM SULFATE ANHYDROUS, SODIUM BICARBONATE, SODIUM CHLORIDE, POTASSIUM CHLORIDE 236; 22.74; 6.74; 5.86; 2.97 G/4L; G/4L; G/4L; G/4L; G/4L
4 POWDER, FOR SOLUTION ORAL ONCE
Qty: 4000 ML | Refills: 0 | Status: SHIPPED | OUTPATIENT
Start: 2025-03-05 | End: 2025-03-05

## 2025-03-05 NOTE — PROGRESS NOTES
[facility] GASTROENTEROLOGY - OFFICE NOTE    3/5/2025    Paul ZAMORANO   1950    Primary Physician: Phong Shrestha APRN    Chief Complaint   Patient presents with    Anemia         HISTORY OF PRESENT ILLNESS:    Paul ZAMORANO is a 74 y.o. male presents  with iron deficiency anemia. Has chronic anemia with recent decreasing hgb and decreasing iron. 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 No sign of active gi bleeding. History of blood transfusion more than 5 years ago. Appetite good. No weight loss. No abdominal pain or n/v. No asa, nsaids, or arthritis meds.           Labs recently:  1/2025 hgb 11.5, 9.8. 2/2025 hgb 9.1, BUN normal. Creat 1.48 to 1.02.       2022 well differentiated adenocarcinoma arising in a tubular adenoma at rectosigmoid colon.   COLONOSCOPY (04/20/2023 10:02) recall 2 years.   Tissue Pathology Exam (04/20/2023 10:26) adenomatous.     UPPER GI ENDOSCOPY (08/18/2022 08:36) gastritis.         Past Medical History:   Diagnosis Date    Cancer     Colonic mass     pt states biopsy during colonoscopy was negative for cancer    Dysuria     Enlarged prostate     Hypertension     Parkinson's disease     Urgency of urination        Past Surgical History:   Procedure Laterality Date    COLON RESECTION N/A 9/2/2022    Procedure: COLON RESECTION LAPAROSCOPIC SIGMOID OR LOW ANTERIOR;  Surgeon: Scarlet Fishman MD;  Location: [facility] OR;  Service: General;  Laterality: N/A;    COLONOSCOPY N/A 8/18/2022    Procedure: COLONOSCOPY WITH ANESTHESIA;  Surgeon: Emil Moody MD;  Location: [facility] ENDOSCOPY;  Service: Gastroenterology;  Laterality: N/A;  pre anemia, LLQ pain  post  phong osorio    COLONOSCOPY N/A 4/20/2023    Procedure: COLONOSCOPY WITH ANESTHESIA;  Surgeon: Emil Moody MD;  Location: [facility] ENDOSCOPY;  Service: Gastroenterology;  Laterality: N/A;  preop: hx colon cancer   post op: stricture  at anastamosis; polyp  PCP: Phong Doss APRN    CYSTOSCOPY      CYSTOSCOPY TRANSURETHRAL RESECTION OF PROSTATE N/A 3/28/2017    Procedure: CYSTOSCOPY TRANSURETHRAL RESECTION OF PROSTATE;  Surgeon: Salbadro Aguilar MD;  Location: [facility] OR;  Service:     ENDOSCOPY N/A 8/18/2022    Procedure: ESOPHAGOGASTRODUODENOSCOPY WITH ANESTHESIA;  Surgeon: Emil Moody MD;  Location:  [facility] ENDOSCOPY;  Service: Gastroenterology;  Laterality: N/A;  pre anemia, LLQ pain  post gastritis  phong osorio    HEMORRHOIDECTOMY      AS TEENAGER    VENOUS ACCESS DEVICE (PORT) INSERTION N/A 11/7/2022    Procedure: INSERTION VENOUS ACCESS DEVICE;  Surgeon: Scarlet Fishman MD;  Location:  [facility] OR;  Service: General;  Laterality: N/A;       Outpatient Medications Marked as Taking for the 3/5/25 encounter (Office Visit) with Isi Davis APRN   Medication Sig Dispense Refill    amantadine (SYMMETREL) 100 MG capsule TAKE ONE CAPSULE TWICE DAILY 60 capsule 4    amLODIPine (NORVASC) 5 MG tablet Take 1 tablet by mouth Daily.      linezolid (ZYVOX) 600 MG tablet       polyethylene glycol (MIRALAX) 17 GM/SCOOP powder Mix 17 g (1 capful) in liquid and take by mouth Daily. 238 g 0     Current Facility-Administered Medications for the 3/5/25 encounter (Office Visit) with Isi Davis APRN   Medication Dose Route Frequency Provider Last Rate Last Admin    lidocaine (XYLOCAINE) 1 % injection 5 mL  5 mL Infiltration Once Eder Powell APRN        sodium bicarbonate injection 0.5 mEq  1 mL Injection Once Eder Powell APRN           No Known Allergies    Social History     Socioeconomic History    Marital status: Legally    Tobacco Use    Smoking status: Every Day     Current packs/day: 0.50     Average packs/day: 0.5 packs/day for 20.0 years (10.0 ttl pk-yrs)     Types: Cigarettes     Passive exposure: Never    Smokeless tobacco: Never   Vaping Use    Vaping status: Never Used   Substance and Sexual Activity     "Alcohol use: Yes     Alcohol/week: 2.0 standard drinks of alcohol     Types: 2 Cans of beer per week     Comment: occ    Drug use: No    Sexual activity: Defer       Family History   Problem Relation Age of Onset    No Known Problems Mother     No Known Problems Father     Colon cancer Maternal Uncle     Colon polyps Neg Hx        Review of Systems   Constitutional:  Negative for chills, fever and unexpected weight change.   Respiratory:  Negative for shortness of breath.    Cardiovascular:  Negative for chest pain.   Gastrointestinal:  Negative for abdominal distention, abdominal pain, anal bleeding, blood in stool, constipation, diarrhea, nausea and vomiting.        Vitals:    03/05/25 0801   BP: 142/82   Pulse: (!) 48   Temp: 97.8 °F (36.6 °C)   SpO2: 99%   Weight: 59.9 kg (132 lb)   Height: 175.3 cm (69\")      Body mass index is 19.49 kg/m².    Physical Exam  Vitals reviewed.   Constitutional:       General: He is not in acute distress.  Cardiovascular:      Rate and Rhythm: Normal rate and regular rhythm.      Heart sounds: Normal heart sounds.   Pulmonary:      Effort: Pulmonary effort is normal.      Breath sounds: Normal breath sounds.   Abdominal:      General: Bowel sounds are normal. There is no distension.      Palpations: Abdomen is soft.      Tenderness: There is no abdominal tenderness.   Skin:     General: Skin is warm and dry.   Neurological:      Mental Status: He is alert.         Results for orders placed or performed in visit on 10/22/24   POC Urinalysis Dipstick, Multipro    Collection Time: 10/22/24  9:47 AM    Specimen: Urine   Result Value Ref Range    Color Yellow Yellow, Straw, Dark Yellow, Stacy    Clarity, UA Clear Clear    Glucose, UA Negative Negative mg/dL    Bilirubin Negative Negative    Ketones, UA Negative Negative    Specific Gravity  1.030 1.005 - 1.030    Blood, UA Small (A) Negative    pH, Urine 5.5 5.0 - 8.0    Protein,  mg/dL (A) Negative mg/dL    Urobilinogen, UA 0.2 " E.U./dL Normal, 0.2 E.U./dL    Nitrite, UA Negative Negative    Leukocytes Negative Negative   PSA DIAGNOSTIC    Collection Time: 10/22/24 10:23 AM    Specimen: Blood   Result Value Ref Range    PSA 6.280 (H) 0.000 - 4.000 ng/mL           ASSESSMENT AND PLAN    Assessment & Plan     Diagnoses and all orders for this visit:    1. Iron deficiency anemia, unspecified iron deficiency anemia type (Primary)  -     Case Request; Standing  -     Case Request    2. Adenocarcinoma of rectosigmoid junction  -     Case Request; Standing  -     Case Request    Other orders  -     Implement Anesthesia Orders Day of Procedure; Standing  -     Follow Anesthesia Guidelines / Protocol; Future  -     polyethylene glycol (Golytely) 236 g solution; Take 4,000 mL by mouth 1 (One) Time for 1 dose. Take as directed per instruction sheet.  Dispense: 4000 mL; Refill: 0    In regards to iron deficiency anemia, this is chronic however worsening hemoglobin recently.  We discussed pursuing GI workup including colonoscopy as well as upper endoscopy and he is agreeable.  Further orders pending patient's progress and test results.            ESOPHAGOGASTRODUODENOSCOPY WITH ANESTHESIA (N/A), COLONOSCOPY WITH ANESTHESIA (N/A)  Risk, benefits, and alternatives of endoscopy were explained in full.  They understand that there is a risk of bleeding, perforation, and infection.  The risk of perforation goes up with esophageal dilation.  Other options to evaluate UGI complaints could involve barium swallow or UGI series, but these would be diagnostic tests only.  Patient was given time to ask questions.  I answered them to their satisfaction and they are agreeable to proceeding. All risks, benefits, alternatives, and indications of colonoscopy procedure have been discussed with the patient. Risks to include perforation of the colon requiring possible surgery or colostomy, risk of bleeding from biopsies or removal of colon tissue, possibility of missing a  colon polyp or cancer, or adverse drug reaction.  Benefits to include the diagnosis and management of disease of the colon and rectum. Alternatives to include barium enema, radiographic evaluation, lab testing or no intervention. Pt verbalizes understanding and agrees.            No follow-ups on file.          There are no Patient Instructions on file for this visit.      Isi Davis, APRN

## 2025-03-21 ENCOUNTER — TELEPHONE (OUTPATIENT)
Dept: GASTROENTEROLOGY | Facility: CLINIC | Age: 75
End: 2025-03-21
Payer: MEDICARE

## 2025-04-06 NOTE — PLAN OF CARE
Goal Outcome Evaluation:  Plan of Care Reviewed With: patient Patient rested well throughout night. C/O pain once that was treated with MAR. States he feels better. No other complaints noted. Patient up to chair this AM.                None

## 2025-04-16 NOTE — TELEPHONE ENCOUNTER
Hub staff attempted to follow warm transfer process and was unsuccessful     Caller: Paul ZAMORANO    Relationship to patient: Self    Best call back number: [phone]    Patient is needing: PT IS CALLING TO CHECK ON HIS ARRIVAL TIME FOR HIS PROCEDURES THAT'S COMING UP IN MAY.    PLEASE CALL AND ADVISE. IT'S OK TO [facility].

## 2025-04-16 NOTE — TELEPHONE ENCOUNTER
Left msg for pt, procedures are 5/14/25 @ 8:00.  Instructions sent via [employer] and mailed to patient.

## 2025-05-14 ENCOUNTER — TELEPHONE (OUTPATIENT)
Dept: GASTROENTEROLOGY | Facility: HOSPITAL | Age: 75
End: 2025-05-14

## 2025-05-20 NOTE — TELEPHONE ENCOUNTER
Left two messages for patient, sent letter to pcp and letter to patient.     No show for egd/colon 5/14/25.

## 2025-05-30 NOTE — TELEPHONE ENCOUNTER
I have left multiple message, sent a letter to the patient and the pcp, and tried the emergency contact.  (Another today)

## (undated) DEVICE — MONOPOLAR METZENBAUM SCISSOR, MINI BLADE TIP, DISPOSABLE: Brand: MONOPOLAR METZENBAUM SCISSOR, MINI BLADE TIP, DISPOSABLE

## (undated) DEVICE — SPNG GZ STRL 2S 4X4 12PLY

## (undated) DEVICE — CONMED SCOPE SAVER BITE BLOCK, 20X27 MM: Brand: SCOPE SAVER

## (undated) DEVICE — BAPTIST TURNOVER KIT: Brand: MEDLINE INDUSTRIES, INC.

## (undated) DEVICE — ESOPHAGEAL/PYLORIC/COLONIC/BILIARY WIREGUIDED BALLOON DILATATION CATHETER: Brand: CRE™ PRO

## (undated) DEVICE — SYR CATH/TIP 50ML 2OZ STRL 1P/U

## (undated) DEVICE — SPNG GZ 2S 2X2 8PLY STRL PK/2

## (undated) DEVICE — ENDOPATH PNEUMONEEDLE INSUFFLATION NEEDLES WITH LUER LOCK CONNECTORS 120MM: Brand: ENDOPATH

## (undated) DEVICE — DRSNG SURESITE WNDW 4X4.5

## (undated) DEVICE — HARMONIC ACE +7 LAPAROSCOPIC SHEARS ADVANCED HEMOSTASIS 5MM DIAMETER 36CM SHAFT LENGTH  FOR USE WITH GRAY HAND PIECE ONLY: Brand: HARMONIC ACE

## (undated) DEVICE — FRCP BIOP ENDO CAPTURAPRO SPK SERR 2.8MM 230CM

## (undated) DEVICE — SUT SILK 0 SUTUPAK TIES 24IN SA76G

## (undated) DEVICE — DOVER HYDROGEL COATED LATEX FOLEY CATHETER, 30 ML, 3-WAY 24 FR/CH (8.0 MM): Brand: DOVER

## (undated) DEVICE — WOUND RETRACTOR AND PROTECTOR: Brand: ALEXIS WOUND PROTECTOR-RETRACTOR

## (undated) DEVICE — APPL CHLORAPREP HI/LITE 26ML ORNG

## (undated) DEVICE — TUBING, SUCTION, 1/4" X 12', STRAIGHT: Brand: MEDLINE

## (undated) DEVICE — THE CHANNEL CLEANING BRUSH IS A NYLON FLEXI BRUSH ATTACHED TO A FLEXIBLE PLASTIC SHEATH DESIGNED TO SAFELY REMOVE DEBRIS FROM FLEXIBLE ENDOSCOPES.

## (undated) DEVICE — SPNG LAP PREWSH SFTPK 18X18IN STRL PK/5

## (undated) DEVICE — 2, DISPOSABLE SUCTION/IRRIGATOR WITHOUT DISPOSABLE TIP: Brand: STRYKEFLOW

## (undated) DEVICE — YANKAUER,BULB TIP WITH VENT: Brand: ARGYLE

## (undated) DEVICE — SENSR O2 OXIMAX FNGR A/ 18IN NONSTR

## (undated) DEVICE — ANTIBACTERIAL UNDYED BRAIDED (POLYGLACTIN 910), SYNTHETIC ABSORBABLE SUTURE: Brand: COATED VICRYL

## (undated) DEVICE — 4-PORT MANIFOLD: Brand: NEPTUNE 2

## (undated) DEVICE — EVAC BLDR UROVAC W ADAPT

## (undated) DEVICE — CLTH CLENS READYCLEANSE PERI CARE PK/5

## (undated) DEVICE — CVR UNIV C/ARM

## (undated) DEVICE — ADHS LIQ MASTISOL 2/3ML

## (undated) DEVICE — GLV SURG BIOGEL M LTX PF 7 1/2

## (undated) DEVICE — PK TURNOVER CYSTO RM

## (undated) DEVICE — FRCP BIOP ENDO CAPTURA/PRO SERR SPK 2.8MM 230CM

## (undated) DEVICE — MASK,OXYGEN,MED CONC,ADLT,7' TUB, UC: Brand: PENDING

## (undated) DEVICE — POOLE SUCTION INSTRUMENT WITH REMOVABLE SHEATH: Brand: POOLE

## (undated) DEVICE — PAD GRND REM POLYHESIVE A/ DISP

## (undated) DEVICE — PAD MINOR UNIVERSAL: Brand: MEDLINE INDUSTRIES, INC.

## (undated) DEVICE — SHEET,DRAPE,53X77,STERILE: Brand: MEDLINE

## (undated) DEVICE — MAJOR DOUBLE BASIN W/GOWNS II: Brand: MEDLINE INDUSTRIES, INC.

## (undated) DEVICE — SUT SILK 2/0 SUTUPAK TIES 24IN SA75H

## (undated) DEVICE — TRAP FLD MINIVAC MEGADYNE 100ML

## (undated) DEVICE — STRIP,CLOSURE,WOUND,MEDI-STRIP,1/2X4: Brand: MEDLINE

## (undated) DEVICE — DEV INFL ALLIANCE2 SYS

## (undated) DEVICE — TBG SMPL FLTR LINE NASL 02/C02 A/ BX/100

## (undated) DEVICE — SUT PDS 1 CTX 36IN VIO PDP371T

## (undated) DEVICE — PK CYSTO 30

## (undated) DEVICE — BAG,DRAINAGE,4L,A/R TOWER,LL,SLIDE TAP: Brand: MEDLINE

## (undated) DEVICE — TOTAL TRAY, 16FR 10ML SIL FOLEY, URN: Brand: MEDLINE

## (undated) DEVICE — SUT PROLN 2/0 SH 36IN 8523H

## (undated) DEVICE — Device: Brand: DEFENDO AIR/WATER/SUCTION AND BIOPSY VALVE

## (undated) DEVICE — PAD LAP CHOLE: Brand: MEDLINE INDUSTRIES, INC.

## (undated) DEVICE — KT CLN CLEANOR SCPE

## (undated) DEVICE — ENDOPATH XCEL WITH OPTIVIEW TECHNOLOGY UNIVERSAL TROCAR STABILITY SLEEVES: Brand: ENDOPATH XCEL OPTIVIEW

## (undated) DEVICE — SUT SILK 2/0 SH CR8 18IN CR8 C012D

## (undated) DEVICE — ELECTRD SUPERSECT FRNT LOAD 5PK

## (undated) DEVICE — NDL SCLEROTHRPY INTERJECT 25G 4 240 CLR

## (undated) DEVICE — Y-TYPE TUR/BLADDER IRRIGATION SET, REGULATING CLAMP

## (undated) DEVICE — SYR LL TP 10ML STRL

## (undated) DEVICE — CUFF,BP,DISP,1 TUBE,ADULT,HP: Brand: MEDLINE

## (undated) DEVICE — IRRIGATOR BULB ASEPTO 60CC STRL

## (undated) DEVICE — ENDOPATH XCEL WITH OPTIVIEW TECHNOLOGY DILATING TIP TROCARS WITH STABILITY SLEEVES: Brand: ENDOPATH XCEL OPTIVIEW

## (undated) DEVICE — BARRIER, ABSORBABLE, ADHESION: Brand: SEPRAFILM®

## (undated) DEVICE — CVR BRD ARM 13X30